# Patient Record
Sex: FEMALE | Race: WHITE | NOT HISPANIC OR LATINO | Employment: OTHER | URBAN - METROPOLITAN AREA
[De-identification: names, ages, dates, MRNs, and addresses within clinical notes are randomized per-mention and may not be internally consistent; named-entity substitution may affect disease eponyms.]

---

## 2017-01-23 RX ORDER — LEVOTHYROXINE SODIUM 88 UG/1
TABLET ORAL
Qty: 45 TABLET | Refills: 2 | Status: SHIPPED | OUTPATIENT
Start: 2017-01-23 | End: 2017-06-09 | Stop reason: SDUPTHER

## 2017-03-30 ENCOUNTER — RESULTS ENCOUNTER (OUTPATIENT)
Dept: FAMILY MEDICINE CLINIC | Facility: CLINIC | Age: 82
End: 2017-03-30

## 2017-03-30 DIAGNOSIS — E03.9 ACQUIRED HYPOTHYROIDISM: ICD-10-CM

## 2017-03-30 DIAGNOSIS — D75.89 MACROCYTOSIS WITHOUT ANEMIA: ICD-10-CM

## 2017-04-03 LAB
BASOPHILS # BLD AUTO: 0.05 10*3/MM3 (ref 0–0.2)
BASOPHILS NFR BLD AUTO: 0.8 % (ref 0–1.5)
EOSINOPHIL # BLD AUTO: 0.16 10*3/MM3 (ref 0–0.7)
EOSINOPHIL NFR BLD AUTO: 2.6 % (ref 0.3–6.2)
ERYTHROCYTE [DISTWIDTH] IN BLOOD BY AUTOMATED COUNT: 14.6 % (ref 11.7–13)
FOLATE SERPL-MCNC: >20 NG/ML (ref 4.78–24.2)
HCT VFR BLD AUTO: 39.1 % (ref 35.6–45.5)
HGB BLD-MCNC: 12.2 G/DL (ref 11.9–15.5)
IMM GRANULOCYTES # BLD: 0 10*3/MM3 (ref 0–0.03)
IMM GRANULOCYTES NFR BLD: 0 % (ref 0–0.5)
LYMPHOCYTES # BLD AUTO: 1.99 10*3/MM3 (ref 0.9–4.8)
LYMPHOCYTES NFR BLD AUTO: 32.4 % (ref 19.6–45.3)
MCH RBC QN AUTO: 32.1 PG (ref 26.9–32)
MCHC RBC AUTO-ENTMCNC: 31.2 G/DL (ref 32.4–36.3)
MCV RBC AUTO: 102.9 FL (ref 80.5–98.2)
METHYLMALONATE SERPL-SCNC: 161 NMOL/L (ref 0–378)
MONOCYTES # BLD AUTO: 0.54 10*3/MM3 (ref 0.2–1.2)
MONOCYTES NFR BLD AUTO: 8.8 % (ref 5–12)
NEUTROPHILS # BLD AUTO: 3.4 10*3/MM3 (ref 1.9–8.1)
NEUTROPHILS NFR BLD AUTO: 55.4 % (ref 42.7–76)
PLATELET # BLD AUTO: 309 10*3/MM3 (ref 140–500)
RBC # BLD AUTO: 3.8 10*6/MM3 (ref 3.9–5.2)
T4 FREE SERPL-MCNC: 1.57 NG/DL (ref 0.93–1.7)
TSH SERPL DL<=0.005 MIU/L-ACNC: 0.82 MIU/ML (ref 0.27–4.2)
VIT B12 SERPL-MCNC: 1582 PG/ML (ref 211–946)
WBC # BLD AUTO: 6.14 10*3/MM3 (ref 4.5–10.7)

## 2017-04-12 ENCOUNTER — TELEPHONE (OUTPATIENT)
Dept: FAMILY MEDICINE CLINIC | Facility: CLINIC | Age: 82
End: 2017-04-12

## 2017-04-12 RX ORDER — MECLIZINE HYDROCHLORIDE 25 MG/1
25 TABLET ORAL EVERY 6 HOURS PRN
Qty: 30 TABLET | Refills: 1 | Status: SHIPPED | OUTPATIENT
Start: 2017-04-12 | End: 2017-04-18 | Stop reason: SDUPTHER

## 2017-04-12 RX ORDER — METHYLPREDNISOLONE 8 MG/1
TABLET ORAL
Qty: 2 TABLET | Refills: 0 | Status: SHIPPED | OUTPATIENT
Start: 2017-04-12 | End: 2017-04-14

## 2017-04-12 NOTE — TELEPHONE ENCOUNTER
----- Message from Emerita Potter sent at 4/12/2017 10:21 AM EDT -----  Contact: PT  DIZZY SPELLS GETTING WORSE , PT IS VERY CONCERNED , SHE HAS AN APPT ON Friday, .  PLEASE ADVISE    CELL [phone]  She had a dizzy spell approximately one week ago.  Then she seemed to do okay and was able to go about her business if she were careful.  She had another dizzy spell last night and again today.  This is described more as vertiginous.  It occurred when she was getting up to go to the bathroom.  She had a recent CBC that was normal.  I suspect this is labyrinthine.  I am going to prescribe some methylprednisolone and meclizine.  Is going to the emergency room for symptoms worsen.

## 2017-04-14 ENCOUNTER — OFFICE VISIT (OUTPATIENT)
Dept: FAMILY MEDICINE CLINIC | Facility: CLINIC | Age: 82
End: 2017-04-14

## 2017-04-14 VITALS
HEART RATE: 71 BPM | RESPIRATION RATE: 15 BRPM | SYSTOLIC BLOOD PRESSURE: 122 MMHG | DIASTOLIC BLOOD PRESSURE: 60 MMHG | TEMPERATURE: 98.3 F

## 2017-04-14 DIAGNOSIS — R82.998 DARK URINE: ICD-10-CM

## 2017-04-14 DIAGNOSIS — R42 DIZZINESS: Primary | ICD-10-CM

## 2017-04-14 DIAGNOSIS — H83.2X9 VESTIBULAR DYSFUNCTION, UNSPECIFIED LATERALITY: ICD-10-CM

## 2017-04-14 LAB
BILIRUB BLD-MCNC: NEGATIVE MG/DL
CLARITY, POC: CLEAR
COLOR UR: YELLOW
GLUCOSE UR STRIP-MCNC: NEGATIVE MG/DL
KETONES UR QL: NEGATIVE
LEUKOCYTE EST, POC: ABNORMAL
NITRITE UR-MCNC: NEGATIVE MG/ML
PH UR: 7 [PH] (ref 5–8)
PROT UR STRIP-MCNC: NEGATIVE MG/DL
RBC # UR STRIP: NEGATIVE /UL
SP GR UR: 1.02 (ref 1–1.03)
UROBILINOGEN UR QL: NORMAL

## 2017-04-14 PROCEDURE — 99214 OFFICE O/P EST MOD 30 MIN: CPT | Performed by: INTERNAL MEDICINE

## 2017-04-14 PROCEDURE — 81003 URINALYSIS AUTO W/O SCOPE: CPT | Performed by: INTERNAL MEDICINE

## 2017-04-14 NOTE — PROGRESS NOTES
Subjective  chief complaint is dizziness  Emerita Bazan is a 81 y.o. female.     History of Present Illness   Emerita is here today for complaints of dizziness.  The first episode occurred approximately 2 weeks ago.  It was very sudden in onset.  She had awakened to go to the bathroom.  She fell into a wall right next to the bed.  She did hit her head but did not feel like she did any serious damage.  This seemed to resolve or at least resolve enough for her to manage the dizziness.  A week later she has begun having more episodes of dizziness.  There is some associated occipital discomfort.  She feels as if something is moving in her head when she turns her head.  Additionally she is complaining of some dark-colored urine.  She reports that she drinks plenty of water each day.  There is no dysuria or hematuria associated with this.  The following portions of the patient's history were reviewed and updated as appropriate: allergies, current medications, past medical history and problem list.    Review of Systems   Constitutional: Negative for chills and fever.   HENT: Positive for congestion.    Genitourinary: Negative for dysuria and hematuria.   Neurological: Positive for dizziness. Negative for speech difficulty, weakness and numbness.       Objective   Physical Exam   Constitutional: She is oriented to person, place, and time. She appears well-developed and well-nourished.   HENT:   External auditory canals are narrow.  There is some guaiacs in the right external auditory canal but the right eardrum looks okay.  The left eardrum looks okay.  There is some bilateral nasal congestion and mild excoriation.  Oral pharynx is clear and palate elevates symmetrically.   Neck: Carotid bruit is not present.   Cardiovascular: Normal rate, regular rhythm and normal heart sounds.    Pressure to my exam is 108/60   Neurological: She is alert and oriented to person, place, and time. She has normal reflexes. She exhibits normal  muscle tone. Coordination normal.   Nursing note and vitals reviewed.      Assessment/Plan   Emerita was seen today for follow-up.    Diagnoses and all orders for this visit:    Dizziness  -     MRI Brain With & Without Contrast; Future  -     Ambulatory Referral to ENT (Otolaryngology)  -     Ambulatory Referral to Physical Therapy Evaluate and treat, Vestibular  -     Comprehensive Metabolic Panel    Vestibular dysfunction, unspecified laterality  -     MRI Brain With & Without Contrast; Future  -     Ambulatory Referral to ENT (Otolaryngology)  -     Ambulatory Referral to Physical Therapy Evaluate and treat, Vestibular  -     Comprehensive Metabolic Panel    Dark urine  -     Comprehensive Metabolic Panel  -     POC Urinalysis Dipstick, Automated  -     Urine Culture      Emerita is here today for evaluation of dizziness.  Her neurologic exam seems to be okay.  I am going to get an MRI scan of the brain because of the discomfort that she is having in the back of her head associated with this.  We are going to have her see an ear nose and throat doctor for possible Bulan Hallpike or Epley maneuvers.  I am also going to set her up for some vestibular retraining.  Hopefully she will get improvement enough to travel to [place] for [place] day.

## 2017-04-16 LAB
ALBUMIN SERPL-MCNC: 4.2 G/DL (ref 3.5–5.2)
ALBUMIN/GLOB SERPL: 1.7 G/DL
ALP SERPL-CCNC: 49 U/L (ref 39–117)
ALT SERPL-CCNC: 12 U/L (ref 1–33)
AST SERPL-CCNC: 15 U/L (ref 1–32)
BACTERIA UR CULT: NORMAL
BACTERIA UR CULT: NORMAL
BILIRUB SERPL-MCNC: 0.4 MG/DL (ref 0.1–1.2)
BUN SERPL-MCNC: 23 MG/DL (ref 8–23)
BUN/CREAT SERPL: 23.7 (ref 7–25)
CALCIUM SERPL-MCNC: 9.4 MG/DL (ref 8.6–10.5)
CHLORIDE SERPL-SCNC: 100 MMOL/L (ref 98–107)
CO2 SERPL-SCNC: 27.8 MMOL/L (ref 22–29)
CREAT SERPL-MCNC: 0.97 MG/DL (ref 0.57–1)
GLOBULIN SER CALC-MCNC: 2.5 GM/DL
GLUCOSE SERPL-MCNC: 85 MG/DL (ref 65–99)
POTASSIUM SERPL-SCNC: 3.7 MMOL/L (ref 3.5–5.2)
PROT SERPL-MCNC: 6.7 G/DL (ref 6–8.5)
SODIUM SERPL-SCNC: 141 MMOL/L (ref 136–145)

## 2017-04-18 RX ORDER — METHYLPREDNISOLONE 4 MG/1
TABLET ORAL
Qty: 21 TABLET | Refills: 0 | Status: SHIPPED | OUTPATIENT
Start: 2017-04-18 | End: 2017-06-09

## 2017-04-18 RX ORDER — MECLIZINE HYDROCHLORIDE 25 MG/1
25 TABLET ORAL EVERY 6 HOURS PRN
Qty: 30 TABLET | Refills: 1 | Status: SHIPPED | OUTPATIENT
Start: 2017-04-18 | End: 2017-06-09

## 2017-04-19 ENCOUNTER — HOSPITAL ENCOUNTER (OUTPATIENT)
Dept: MRI IMAGING | Facility: HOSPITAL | Age: 82
Discharge: HOME | End: 2017-04-19
Attending: INTERNAL MEDICINE | Admitting: INTERNAL MEDICINE

## 2017-04-19 ENCOUNTER — TELEPHONE (OUTPATIENT)
Dept: FAMILY MEDICINE CLINIC | Facility: CLINIC | Age: 82
End: 2017-04-19

## 2017-04-19 DIAGNOSIS — R42 DIZZINESS: ICD-10-CM

## 2017-04-19 DIAGNOSIS — H83.2X9 VESTIBULAR DYSFUNCTION, UNSPECIFIED LATERALITY: ICD-10-CM

## 2017-04-19 LAB — CREAT BLDA-MCNC: 1 MG/DL (ref 0.6–1.3)

## 2017-04-19 PROCEDURE — 0 GADOBENATE DIMEGLUMINE 529 MG/ML SOLUTION: Performed by: INTERNAL MEDICINE

## 2017-04-19 PROCEDURE — 70553 MRI BRAIN STEM W/O & W/DYE: CPT

## 2017-04-19 PROCEDURE — 82565 ASSAY OF CREATININE: CPT

## 2017-04-19 PROCEDURE — A9577 INJ MULTIHANCE: HCPCS | Performed by: INTERNAL MEDICINE

## 2017-04-19 RX ORDER — CEPHALEXIN 250 MG/1
500 CAPSULE ORAL 2 TIMES DAILY
Qty: 14 CAPSULE | Refills: 0 | Status: SHIPPED | OUTPATIENT
Start: 2017-04-19 | End: 2017-04-25

## 2017-04-19 RX ADMIN — GADOBENATE DIMEGLUMINE 11 ML: 529 INJECTION, SOLUTION INTRAVENOUS at 19:15

## 2017-04-19 NOTE — TELEPHONE ENCOUNTER
Pt says an antibiotic for her UTI was to be sent to her pharmacy. I do not see anything in chart regarding so. If so, can you please let me know what to call in for her?    Not sure what happened but script now sent

## 2017-04-24 ENCOUNTER — TELEPHONE (OUTPATIENT)
Dept: FAMILY MEDICINE CLINIC | Facility: CLINIC | Age: 82
End: 2017-04-24

## 2017-04-24 ENCOUNTER — HOSPITAL ENCOUNTER (OUTPATIENT)
Dept: PHYSICAL THERAPY | Facility: HOSPITAL | Age: 82
Setting detail: THERAPIES SERIES
Discharge: HOME | End: 2017-04-24
Attending: INTERNAL MEDICINE

## 2017-04-24 DIAGNOSIS — R42 DIZZINESS: Primary | ICD-10-CM

## 2017-04-24 DIAGNOSIS — H81.11 BENIGN PAROXYSMAL POSITIONAL VERTIGO, RIGHT: ICD-10-CM

## 2017-04-24 PROCEDURE — G8979 MOBILITY GOAL STATUS: HCPCS

## 2017-04-24 PROCEDURE — 97161 PT EVAL LOW COMPLEX 20 MIN: CPT

## 2017-04-24 PROCEDURE — 95992 CANALITH REPOSITIONING PROC: CPT

## 2017-04-24 PROCEDURE — G8978 MOBILITY CURRENT STATUS: HCPCS

## 2017-04-24 NOTE — PROGRESS NOTES
"    Outpatient Physical Therapy Vestibular Initial Evaluation  [facility]     Patient Name: Emerita Bazan  : 1935  MRN: [id number]  Today's Date: 2017      Visit Date: 2017    Patient Active Problem List   Diagnosis   • Atopic rhinitis   • Bunion   • Gastroesophageal reflux disease   • Hammer toe   • Hyperlipidemia   • Hypothyroidism   • Neuralgia   • Melanocytic nevus   • Seborrheic keratosis        Past Medical History:   Diagnosis Date   • Arthritis    • Hypothyroid    • Osteoporosis         Past Surgical History:   Procedure Laterality Date   • BUNIONECTOMY     • EYE SURGERY      cataracts         Visit Dx:     ICD-10-CM ICD-9-CM   1. Dizziness R42 780.4   2. Benign paroxysmal positional vertigo, right H81.11 386.11             Patient History       17 1302          History    Chief Complaint Dizziness;Balance Problems  -SETH      Date Current Problem(s) Began 17  -SETH      Brief Description of Current Complaint Pt reports sudden onset of spinning sensation getting up out of bed ~ 3 weeks ago. Pt said she lost her balance to the R and hit her head after she stood up that day. She went to see her PCP ~ 1 week later and he ordered \"anti-dizzy\" medicine and steroids. She reports symptoms are a little better but still present when she lies down in bed, went to the dentist and hairdresser last week too.   -SETH      Previous treatment for THIS PROBLEM Medication  -SETH      Onset Date- PT 2017  -SETH      Patient/Caregiver Goals Relief from dizziness  -SETH      Patient seeing anyone else for problem(s)? Yes   pt scheduled to see ENT next week.   -SETH      Pain     Pain at Present 3   in back of head  -SETH      Fall Risk Assessment    Any falls in the past year: Yes  -SETH      Number of falls reported in the last 12 months 2   in past year  -SETH      Factors that contributed to the fall: Lost balance;Tripped  -SETH      Daily Activities    Primary Language English  -SETH      Are you able to read " "Yes  -SETH      Are you able to write Yes  -SETH      How does patient learn best? Listening;Reading  -SETH      Teaching needs identified Home Exercise Program;Management of Condition;Falls Prevention  -SETH      Patient is concerned about/has problems with Walking;Performing home management (household chores, shopping, care of dependents)  -SETH      Barriers to learning None  -SETH      Pt Participated in POC and Goals Yes  -SETH      Safety    Are you being hurt, hit, or frightened by anyone at home or in your life? No  -SETH      Are you being neglected by a caregiver No  -SETH        User Key  (r) = Recorded By, (t) = Taken By, (c) = Cosigned By    Initials Name Provider Type    SETH Mcknight, PT Physical Therapist                Vestibular Marcella       04/24/17 1302          Subjective Comments    Subjective Comments Pt said she is currently not taking \"anti dizzy\" medicine and feeling OK except when she changes positions.   -SETH      Pain Assessment    Pain Assessment 0-10  -SETH      Pain Score 3  -SETH      Post Pain Score 3  -SETH      Pain Location Head  -SETH      Pain Orientation Posterior  -SETH      Vestibular Objective    General Observation Pt is well nourished female who arrived ambulating without assistive device.   -SETH      Cognition    Orientation Level Oriented to place;Oriented to time;Oriented to situation;Oriented to person  -SETH      Symptom Behavior    Type of Dizziness Spinning;Funny feeling in head;Lightheadedness;Imbalance  -SETH      Frequency of Dizziness Several Times a Day   mostly when in aggravating positions  -SETH      Duration of Dizziness Seconds  -SETH      Aggravating Factors Looking up to the ceiling;Supine to sit;Lying supine;Rolling to right;Rolling to left;Forward bending  -SETH      Relieving Factors Slow movements;Rest  -SETH      Occulomotor Exam Fixation Present    Smooth Pursuit Normal   no fixation present  -SETH      Saccades Intact   no fixation present   -SETH      VOR with Occulomotor Exam Fixation " "Absent     Spontaneous Nystagmus Absent  -SETH      Gaze-Induced Nystagmus Absent  -SETH      Head-Shaking Nystagmus Horizontal Absent  -SETH      Head-Shaking Nystagmus Vertical Absent   c/o feeling woozy once stopped moving head  -SETH      Positional Testing    Positional Testing Without infrared goggles  -SETH      Vertebrobasilar Artery Screen - Right Negative  -SETH      Vertebrobasilar Artery Screen - Left Negative  -SETH      Lakhwinder-Hallpike Right Upbeat Nystagmus  -SETH      Lakhwinder-Hallpike Right Onset Time  Head off mat -- immediate onset with c/o spinning sensation lasting ~ 10 seconds. To sit, c/o spinning sensation ~ 15 seconds.   -SETH      Gilliam-Hallpike Left No nystagmus   no symptoms  -SETH      ROM (Range of Motion)    General ROM no range of motion deficits identified  -SETH      Sensation    Sensation --   Light touch intact all extremities  -SETH      Strength    Lumbar/Hip --   Strength LE's 4+/5 except 4/5 hip flexors.   -SETH      Static    Static --   modified CTSIB independent except foam EC CG/min 10 sec.   -SETH      High-level Balance    High-level Balance Other Transfers: independent  -SETH        User Key  (r) = Recorded By, (t) = Taken By, (c) = Cosigned By    Initials Name Provider Type    SETH Mcknight PT Physical Therapist                            Therapy Education       04/24/17 1618          Therapy Education    Given HEP;Symptoms/condition management   Ling/David to start on Wednesday. Post CRT instructions.   -SETH      Program New  -SETH      How Provided Verbal;Demonstration;Written  -SETH      Provided to Patient  -SETH      Level of Understanding Teach back education performed;Verbalized;Demonstrated  -SETH        User Key  (r) = Recorded By, (t) = Taken By, (c) = Cosigned By    Initials Name Provider Type    SETH Mcknight PT Physical Therapist                  Exercises       04/24/17 1302          Subjective Comments    Subjective Comments Pt said she is currently not taking \"anti dizzy\" medicine and " feeling OK except when she changes positions.   -SETH        User Key  (r) = Recorded By, (t) = Taken By, (c) = Cosigned By    Initials Name Provider Type    SETH Mcknight PT Physical Therapist                     Balance Exercises (last 24 hours)      Balance Exercises       04/24/17 1302          Canalith Repositioning    Activity Epley;Right  -SETH      Reps 2  -SETH      Intensity Mild;Moderate   pt sat ~ 10 minutes after each Epley  -SETH        User Key  (r) = Recorded By, (t) = Taken By, (c) = Cosigned By    Initials Name Provider Type    SETH Mcknight, PT Physical Therapist                PT OP Goals       04/24/17 1600       Long Term Goals    LTG Date to Achieve 05/22/17  -SETH     LTG 1 Pt will be independent with vestibular HEP.   -SETH     LTG 2 Pt will have reduced score on the Dizziness Handicap Inventory of < 10.   -SETH     LTG 3 Pt will have a score of 22/24 on the Dynamic Gait Index for improved balance with gait related tasks and decreased fall risk.   -SETH     LTG 4 Pt will ambulate 30' x 2 with horizontal head turns and then vertical head turns independently without symptoms of imbalance or dizziness.   -SETH       User Key  (r) = Recorded By, (t) = Taken By, (c) = Cosigned By    Initials Name Provider Type    SETH Mcknight, PT Physical Therapist                PT Assessment/Plan       04/24/17 1630       PT Assessment    Functional Limitations Decreased safety during functional activities;Limitation in home management;Performance in self-care ADL;Performance in leisure activities  -SETH     Impairments Balance  -     Assessment Comments Pt presents with symptoms consistent with positional vertigo with higher score on the Dizziness Handicap Inventory with some symptoms interfering with her score on the Dynamic Gait Index. She had positive R [place]-Hallpike. Therefore R CRT completed. Pt will benefit from outpt PT for vestibular rehab.   -SETH     Please refer to paper survey for additional  self-reported information Yes  -SETH     Rehab Potential Good  -SETH     Patient/caregiver participated in establishment of treatment plan and goals Yes  -SETH     Patient would benefit from skilled therapy intervention Yes  -SETH     PT Plan    PT Frequency 1x/week  -SETH     Predicted Duration of Therapy Intervention (days/wks) 4 weeks  -SETH     Planned CPT's? PT EVAL LOW COMPLEXITY: 49845   and Canalith Re-positioning   -SETH     Physical Therapy Interventions (Optional Details) balance training;vestibular training  -SETH       User Key  (r) = Recorded By, (t) = Taken By, (c) = Cosigned By    Initials Name Provider Type    SETH Susy Mcknight, PT Physical Therapist                 Outcome Measures       04/24/17 1302          Dynamic Gait Index (DGI)    Gait Level Surface 3  -SETH      Change in Gait Speed 3  -SETH      Gait with Horizontal Head Turns 2   lightheaded   -SETH      Gait with Vertical Head Turns 1   woozy, off balance  -SETH      Gait and Pivot Turn 3  -SETH      Step Over Obstacle 3  -SETH      Step Around Obstacles 3  -SETH      Steps 2  -SETH      Dynamic Gait Index Score 20  -SETH      Functional Assessment    Outcome Measure Options Dizziness Handicap Inventory;Dynamic Gait Index   score of  32  -SETH        User Key  (r) = Recorded By, (t) = Taken By, (c) = Cosigned By    Initials Name Provider Type    SETH Susy Mcknight, PT Physical Therapist            Time Calculation:   Start Time: 1302  Stop Time: 1402  Time Calculation (min): 60 min     Therapy Charges for Today     Code Description Service Date Service Provider Modifiers Qty    39388285574 HC PT MOBILITY CURRENT 4/24/2017 Susy Mcknight, PT GP, CI 1    57979549756 HC PT MOBILITY PROJECTED 4/24/2017 Susy Mcknight, PT GP, CI 1    12415164863 HC PT EVAL LOW COMPLEXITY 3 4/24/2017 Susy Mcknight, PT GP 1    75687629625 HC PT CANALITH REPOSITIONING PER DAY 4/24/2017 Susy Mcknight, PT GP 1          PT G-Codes  PT Professional Judgement Used?: Yes  Outcome Measure  Options: Dizziness Handicap Inventory, Dynamic Gait Index  Functional Limitation: Mobility: Walking and moving around  Mobility: Walking and Moving Around Current Status (): At least 1 percent but less than 20 percent impaired, limited or restricted  Mobility: Walking and Moving Around Goal Status (): At least 1 percent but less than 20 percent impaired, limited or restricted         Susy Mcknight, PT  4/24/2017

## 2017-04-25 RX ORDER — AMOXICILLIN 875 MG/1
875 TABLET, COATED ORAL 2 TIMES DAILY
Qty: 20 TABLET | Refills: 0 | Status: SHIPPED | OUTPATIENT
Start: 2017-04-25 | End: 2017-06-09

## 2017-04-27 ENCOUNTER — APPOINTMENT (OUTPATIENT)
Dept: PHYSICAL THERAPY | Facility: HOSPITAL | Age: 82
End: 2017-04-27

## 2017-05-04 ENCOUNTER — APPOINTMENT (OUTPATIENT)
Dept: PHYSICAL THERAPY | Facility: HOSPITAL | Age: 82
End: 2017-05-04

## 2017-05-04 ENCOUNTER — TELEPHONE (OUTPATIENT)
Dept: PHYSICAL THERAPY | Facility: HOSPITAL | Age: 82
End: 2017-05-04

## 2017-05-10 RX ORDER — LEVOTHYROXINE SODIUM 0.1 MG/1
100 TABLET ORAL EVERY OTHER DAY
Qty: 45 TABLET | Refills: 1 | Status: SHIPPED | OUTPATIENT
Start: 2017-05-10 | End: 2017-06-09 | Stop reason: SDUPTHER

## 2017-05-11 ENCOUNTER — APPOINTMENT (OUTPATIENT)
Dept: PHYSICAL THERAPY | Facility: HOSPITAL | Age: 82
End: 2017-05-11

## 2017-05-18 ENCOUNTER — APPOINTMENT (OUTPATIENT)
Dept: PHYSICAL THERAPY | Facility: HOSPITAL | Age: 82
End: 2017-05-18

## 2017-05-25 ENCOUNTER — APPOINTMENT (OUTPATIENT)
Dept: PHYSICAL THERAPY | Facility: HOSPITAL | Age: 82
End: 2017-05-25

## 2017-05-30 ENCOUNTER — RESULTS ENCOUNTER (OUTPATIENT)
Dept: FAMILY MEDICINE CLINIC | Facility: CLINIC | Age: 82
End: 2017-05-30

## 2017-05-30 DIAGNOSIS — D75.89 MACROCYTOSIS WITHOUT ANEMIA: ICD-10-CM

## 2017-05-30 DIAGNOSIS — E78.5 HYPERLIPIDEMIA, UNSPECIFIED HYPERLIPIDEMIA TYPE: Primary | ICD-10-CM

## 2017-05-30 DIAGNOSIS — N30.90 CYSTITIS: ICD-10-CM

## 2017-05-31 ENCOUNTER — RESULTS ENCOUNTER (OUTPATIENT)
Dept: FAMILY MEDICINE CLINIC | Facility: CLINIC | Age: 82
End: 2017-05-31

## 2017-05-31 DIAGNOSIS — D75.89 MACROCYTOSIS WITHOUT ANEMIA: ICD-10-CM

## 2017-05-31 DIAGNOSIS — E78.5 HYPERLIPIDEMIA, UNSPECIFIED HYPERLIPIDEMIA TYPE: ICD-10-CM

## 2017-06-01 ENCOUNTER — APPOINTMENT (OUTPATIENT)
Dept: PHYSICAL THERAPY | Facility: HOSPITAL | Age: 82
End: 2017-06-01

## 2017-06-02 LAB
ALBUMIN SERPL-MCNC: 3.9 G/DL (ref 3.5–5.2)
ALBUMIN/GLOB SERPL: 1.4 G/DL
ALP SERPL-CCNC: 50 U/L (ref 39–117)
ALT SERPL-CCNC: 12 U/L (ref 1–33)
AST SERPL-CCNC: 16 U/L (ref 1–32)
BASOPHILS # BLD AUTO: 0.04 10*3/MM3 (ref 0–0.2)
BASOPHILS NFR BLD AUTO: 0.5 % (ref 0–1.5)
BILIRUB SERPL-MCNC: 0.4 MG/DL (ref 0.1–1.2)
BUN SERPL-MCNC: 20 MG/DL (ref 8–23)
BUN/CREAT SERPL: 22 (ref 7–25)
CALCIUM SERPL-MCNC: 9.5 MG/DL (ref 8.6–10.5)
CHLORIDE SERPL-SCNC: 102 MMOL/L (ref 98–107)
CHOLEST SERPL-MCNC: 221 MG/DL (ref 0–200)
CO2 SERPL-SCNC: 24.8 MMOL/L (ref 22–29)
CREAT SERPL-MCNC: 0.91 MG/DL (ref 0.57–1)
EOSINOPHIL # BLD AUTO: 0.24 10*3/MM3 (ref 0–0.7)
EOSINOPHIL NFR BLD AUTO: 3.2 % (ref 0.3–6.2)
ERYTHROCYTE [DISTWIDTH] IN BLOOD BY AUTOMATED COUNT: 13.4 % (ref 11.7–13)
GLOBULIN SER CALC-MCNC: 2.8 GM/DL
GLUCOSE SERPL-MCNC: 87 MG/DL (ref 65–99)
HCT VFR BLD AUTO: 39.1 % (ref 35.6–45.5)
HDLC SERPL-MCNC: 56 MG/DL (ref 40–60)
HGB BLD-MCNC: 12.7 G/DL (ref 11.9–15.5)
IMM GRANULOCYTES # BLD: 0 10*3/MM3 (ref 0–0.03)
IMM GRANULOCYTES NFR BLD: 0 % (ref 0–0.5)
INTERPRETATION: NORMAL
LDLC SERPL CALC-MCNC: 136 MG/DL (ref 0–100)
LYMPHOCYTES # BLD AUTO: 2.09 10*3/MM3 (ref 0.9–4.8)
LYMPHOCYTES NFR BLD AUTO: 28 % (ref 19.6–45.3)
MCH RBC QN AUTO: 33.3 PG (ref 26.9–32)
MCHC RBC AUTO-ENTMCNC: 32.5 G/DL (ref 32.4–36.3)
MCV RBC AUTO: 102.6 FL (ref 80.5–98.2)
MONOCYTES # BLD AUTO: 0.72 10*3/MM3 (ref 0.2–1.2)
MONOCYTES NFR BLD AUTO: 9.7 % (ref 5–12)
NEUTROPHILS # BLD AUTO: 4.37 10*3/MM3 (ref 1.9–8.1)
NEUTROPHILS NFR BLD AUTO: 58.6 % (ref 42.7–76)
PLATELET # BLD AUTO: 295 10*3/MM3 (ref 140–500)
POTASSIUM SERPL-SCNC: 4.4 MMOL/L (ref 3.5–5.2)
PROT SERPL-MCNC: 6.7 G/DL (ref 6–8.5)
RBC # BLD AUTO: 3.81 10*6/MM3 (ref 3.9–5.2)
SODIUM SERPL-SCNC: 140 MMOL/L (ref 136–145)
TRIGL SERPL-MCNC: 145 MG/DL (ref 0–150)
VLDLC SERPL CALC-MCNC: 29 MG/DL (ref 5–40)
WBC # BLD AUTO: 7.46 10*3/MM3 (ref 4.5–10.7)

## 2017-06-04 LAB
BACTERIA UR CULT: NORMAL
BACTERIA UR CULT: NORMAL

## 2017-06-05 ENCOUNTER — TELEPHONE (OUTPATIENT)
Dept: PHYSICAL THERAPY | Facility: HOSPITAL | Age: 82
End: 2017-06-05

## 2017-06-05 ENCOUNTER — DOCUMENTATION (OUTPATIENT)
Dept: PHYSICAL THERAPY | Facility: HOSPITAL | Age: 82
End: 2017-06-05

## 2017-06-05 NOTE — THERAPY DISCHARGE NOTE
Outpatient Physical Therapy Discharge Summary         Patient Name: Emerita Bazan  : 1935  MRN: [id number]    Today's Date: 2017    Visit Dx:  No diagnosis found.          PT OP Goals       17 1700       Long Term Goals    LTG Date to Achieve 17  -SETH     LTG 1 Pt will be independent with vestibular HEP.   -SETH     LTG 2 Pt will have reduced score on the Dizziness Handicap Inventory of < 10.   -SETH     LTG 2 Progress Not Met  -SETH     LTG 2 Progress Comments Spoke to pt on phone today and she said all vestibular symptoms are resolved but pt did not return for formal retest of Dizziness Handicap Inventory.   -SETH     LTG 3 Pt will have a score of 22/24 on the Dynamic Gait Index for improved balance with gait related tasks and decreased fall risk.   -SETH     LTG 3 Progress Not Met  -SETH     LTG 3 Progress Comments Pt did not return for retest.   -SETH     LTG 4 Pt will ambulate 30' x 2 with horizontal head turns and then vertical head turns independently without symptoms of imbalance or dizziness.   -SETH     LTG 4 Progress Not Met  -SETH     LTG 4 Progress Comments Pt did not return to be retested.   -SETH       User Key  (r) = Recorded By, (t) = Taken By, (c) = Cosigned By    Initials Name Provider Type    SETH Mcknight, PT Physical Therapist          OP PT Discharge Summary  Date of Discharge: 17  Reason for Discharge: Independent  Outcomes Achieved: Patient able to partially acheive established goals (Pt told PT on phone today that all her vestibular symptoms have been resolved. )  Discharge Destination: Home without follow-up      Time Calculation:                    Susy Mcknight, PT  2017

## 2017-06-08 ENCOUNTER — APPOINTMENT (OUTPATIENT)
Dept: PHYSICAL THERAPY | Facility: HOSPITAL | Age: 82
End: 2017-06-08

## 2017-06-09 ENCOUNTER — OFFICE VISIT (OUTPATIENT)
Dept: FAMILY MEDICINE CLINIC | Facility: CLINIC | Age: 82
End: 2017-06-09

## 2017-06-09 VITALS
SYSTOLIC BLOOD PRESSURE: 102 MMHG | RESPIRATION RATE: 15 BRPM | DIASTOLIC BLOOD PRESSURE: 60 MMHG | BODY MASS INDEX: 25.4 KG/M2 | HEIGHT: 59 IN | WEIGHT: 126 LBS | HEART RATE: 87 BPM | TEMPERATURE: 98.1 F

## 2017-06-09 DIAGNOSIS — E03.9 ACQUIRED HYPOTHYROIDISM: ICD-10-CM

## 2017-06-09 DIAGNOSIS — D75.89 MACROCYTOSIS WITHOUT ANEMIA: Primary | ICD-10-CM

## 2017-06-09 DIAGNOSIS — E78.5 HYPERLIPIDEMIA, UNSPECIFIED HYPERLIPIDEMIA TYPE: ICD-10-CM

## 2017-06-09 PROCEDURE — 99214 OFFICE O/P EST MOD 30 MIN: CPT | Performed by: INTERNAL MEDICINE

## 2017-06-09 RX ORDER — LEVOTHYROXINE SODIUM 88 UG/1
88 TABLET ORAL EVERY OTHER DAY
Qty: 50 TABLET | Refills: 1 | Status: SHIPPED | OUTPATIENT
Start: 2017-06-09 | End: 2017-06-09 | Stop reason: SDUPTHER

## 2017-06-09 RX ORDER — ACETAMINOPHEN 500 MG
500 TABLET ORAL AS NEEDED
COMMUNITY

## 2017-06-09 RX ORDER — LEVOTHYROXINE SODIUM 88 UG/1
88 TABLET ORAL EVERY OTHER DAY
Qty: 50 TABLET | Refills: 1 | Status: SHIPPED | OUTPATIENT
Start: 2017-06-09 | End: 2017-07-31 | Stop reason: SDUPTHER

## 2017-06-09 RX ORDER — LEVOTHYROXINE SODIUM 0.1 MG/1
100 TABLET ORAL EVERY OTHER DAY
Qty: 50 TABLET | Refills: 1 | Status: SHIPPED | OUTPATIENT
Start: 2017-06-09 | End: 2017-07-31 | Stop reason: SDUPTHER

## 2017-06-09 NOTE — PROGRESS NOTES
Subjective chief complaint is follow-up for dizziness and lab results  Emerita Bazan is a 81 y.o. female.     History of Present Illness   Emerita is here today for follow-up.  Her dizziness seems to have improved.  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  It is suspected that these occurred at the time she had her liver abscess.  I do not think they are contributing to her balance problems.  Her balance issues seem to have resolved.  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  She had normal vitamin B12 and folic acid levels.  The other cell lines seem to be okay.  We did discuss this and have decided just to continue to watch this.  We did discuss the possibility of a myelodysplastic process.  She is on some level thyroxine alternating 88 µg 100 µg.  This is controlled her thyroid fairly well.  Apparently there was a mixup at the pharmacy we are going to give her written prescriptions for these today.  Her gynecologist is going to be retiring we did discuss having her see Dr. Bergeron.  She continues to complain of some yellow urine.  Her specific gravity on her urinalysis was 1.020.  We did discuss drinking more water.  Past medical history is remarkable for hyperlipidemia.  This is very mild.  We did discuss this in the face of her small vessel disease.  We are going to have her continue to watch her diet.  The following portions of the patient's history were reviewed and updated as appropriate: allergies, current medications, past medical history and problem list.    Review of Systems   Constitutional: Negative for activity change and appetite change.       Objective   Physical Exam   Constitutional: She appears well-developed and well-nourished.   Cardiovascular: Normal rate, regular rhythm and normal heart sounds.    Pulmonary/Chest: Effort normal and breath sounds normal.   Abdominal:   There is no hepatosplenomegaly   Nursing note and vitals reviewed.      Assessment/Plan   Emerita was seen today  for med management and follow-up.    Diagnoses and all orders for this visit:    Macrocytosis without anemia  -     CBC & Differential; Future    Acquired hypothyroidism    Hyperlipidemia, unspecified hyperlipidemia type    Other orders  -     levothyroxine (SYNTHROID, LEVOTHROID) 100 MCG tablet; Take 1 tablet by mouth Every Other Day.  -     Discontinue: levothyroxine (SYNTHROID, LEVOTHROID) 88 MCG tablet; Take 1 tablet by mouth Every Other Day.  -     levothyroxine (SYNTHROID, LEVOTHROID) 88 MCG tablet; Take 1 tablet by mouth Every Other Day.      Emerita is here today for follow-up.  We did discuss her laboratories in detail.  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  We did advise her to drink more water for the concentrated urine.  We are going to let her continue to watch her diet for her cholesterol.  We are going to do a follow-up blood count in 3 months.  Should she develop problems in her other cell lines we may refer her to a hematologist.

## 2017-06-15 ENCOUNTER — APPOINTMENT (OUTPATIENT)
Dept: PHYSICAL THERAPY | Facility: HOSPITAL | Age: 82
End: 2017-06-15

## 2017-06-22 ENCOUNTER — APPOINTMENT (OUTPATIENT)
Dept: PHYSICAL THERAPY | Facility: HOSPITAL | Age: 82
End: 2017-06-22

## 2017-06-29 ENCOUNTER — APPOINTMENT (OUTPATIENT)
Dept: PHYSICAL THERAPY | Facility: HOSPITAL | Age: 82
End: 2017-06-29

## 2017-07-20 ENCOUNTER — OFFICE VISIT (OUTPATIENT)
Dept: FAMILY MEDICINE CLINIC | Facility: CLINIC | Age: 82
End: 2017-07-20

## 2017-07-20 VITALS
HEART RATE: 71 BPM | HEIGHT: 59 IN | RESPIRATION RATE: 16 BRPM | DIASTOLIC BLOOD PRESSURE: 70 MMHG | SYSTOLIC BLOOD PRESSURE: 118 MMHG | TEMPERATURE: 97.7 F

## 2017-07-20 DIAGNOSIS — F41.9 ANXIETY: ICD-10-CM
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
## 2017-07-20 DIAGNOSIS — R42 VERTIGO: ICD-10-CM

## 2017-07-20 DIAGNOSIS — T07.XXXA MULTIPLE CONTUSIONS: ICD-10-CM

## 2017-07-20 PROCEDURE — 99214 OFFICE O/P EST MOD 30 MIN: CPT | Performed by: INTERNAL MEDICINE

## 2017-07-20 RX ORDER — MECLIZINE HYDROCHLORIDE 25 MG/1
25 TABLET ORAL EVERY 6 HOURS PRN
Qty: 30 TABLET | Refills: 1 | Status: SHIPPED | OUTPATIENT
Start: 2017-07-20 | End: 2017-10-19
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
## 2017-07-31 ENCOUNTER — OFFICE VISIT (OUTPATIENT)
Dept: FAMILY MEDICINE CLINIC | Facility: CLINIC | Age: 82
End: 2017-07-31

## 2017-07-31 VITALS
DIASTOLIC BLOOD PRESSURE: 62 MMHG | SYSTOLIC BLOOD PRESSURE: 100 MMHG | HEIGHT: 59 IN | HEART RATE: 79 BPM | TEMPERATURE: 98.2 F

## 2017-07-31 DIAGNOSIS — R42 VERTIGO: ICD-10-CM
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
## 2017-07-31 DIAGNOSIS — K64.9 HEMORRHOIDS, UNSPECIFIED HEMORRHOID TYPE: ICD-10-CM

## 2017-07-31 DIAGNOSIS — M53.3 COCCYX PAIN: ICD-10-CM

## 2017-07-31 DIAGNOSIS — M54.50 ACUTE MIDLINE LOW BACK PAIN WITHOUT SCIATICA: ICD-10-CM

## 2017-07-31 DIAGNOSIS — M54.6 ACUTE RIGHT-SIDED THORACIC BACK PAIN: ICD-10-CM

## 2017-07-31 DIAGNOSIS — M25.532 LEFT WRIST PAIN: ICD-10-CM

## 2017-07-31 PROCEDURE — 99214 OFFICE O/P EST MOD 30 MIN: CPT | Performed by: INTERNAL MEDICINE

## 2017-07-31 RX ORDER — HYDROCORTISONE ACETATE 25 MG/1
25 SUPPOSITORY RECTAL 2 TIMES DAILY
Qty: 20 SUPPOSITORY | Refills: 0 | Status: SHIPPED | OUTPATIENT
Start: 2017-07-31 | End: 2017-07-31 | Stop reason: SDUPTHER

## 2017-07-31 RX ORDER — HYDROCORTISONE ACETATE 25 MG/1
25 SUPPOSITORY RECTAL 2 TIMES DAILY
Qty: 20 SUPPOSITORY | Refills: 0 | Status: SHIPPED | OUTPATIENT
Start: 2017-07-31 | End: 2017-10-19

## 2017-07-31 RX ORDER — LEVOTHYROXINE SODIUM 88 UG/1
TABLET ORAL
Qty: 65 TABLET | Refills: 1 | Status: SHIPPED | OUTPATIENT
Start: 2017-07-31 | End: 2018-02-02

## 2017-07-31 RX ORDER — LEVOTHYROXINE SODIUM 0.1 MG/1
TABLET ORAL
Qty: 30 TABLET | Refills: 1 | Status: SHIPPED | OUTPATIENT
Start: 2017-07-31 | End: 2017-10-19
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
## 2017-08-02 ENCOUNTER — HOSPITAL ENCOUNTER (OUTPATIENT)
Dept: GENERAL RADIOLOGY | Facility: HOSPITAL | Age: 82
Discharge: HOME | End: 2017-08-02
Attending: INTERNAL MEDICINE | Admitting: INTERNAL MEDICINE

## 2017-08-02 ENCOUNTER — HOSPITAL ENCOUNTER (OUTPATIENT)
Dept: GENERAL RADIOLOGY | Facility: HOSPITAL | Age: 82
Discharge: HOME | End: 2017-08-02
Attending: INTERNAL MEDICINE

## 2017-08-02 DIAGNOSIS — IMO0002 COMPRESSION FRACTURE: Primary | ICD-10-CM

## 2017-08-02 DIAGNOSIS — M54.6 ACUTE RIGHT-SIDED THORACIC BACK PAIN: ICD-10-CM

## 2017-08-02 DIAGNOSIS — M25.532 LEFT WRIST PAIN: ICD-10-CM

## 2017-08-02 PROCEDURE — 73110 X-RAY EXAM OF WRIST: CPT

## 2017-08-02 PROCEDURE — 72072 X-RAY EXAM THORAC SPINE 3VWS: CPT

## 2017-08-07 ENCOUNTER — HOSPITAL ENCOUNTER (OUTPATIENT)
Dept: MRI IMAGING | Facility: HOSPITAL | Age: 82
Discharge: HOME | End: 2017-08-07
Attending: INTERNAL MEDICINE | Admitting: INTERNAL MEDICINE

## 2017-08-07 ENCOUNTER — HOSPITAL ENCOUNTER (OUTPATIENT)
Dept: MRI IMAGING | Facility: HOSPITAL | Age: 82
Discharge: HOME | End: 2017-08-07
Attending: INTERNAL MEDICINE

## 2017-08-07 DIAGNOSIS — IMO0002 COMPRESSION FRACTURE: ICD-10-CM

## 2017-08-07 DIAGNOSIS — M54.50 ACUTE MIDLINE LOW BACK PAIN WITHOUT SCIATICA: ICD-10-CM

## 2017-08-07 DIAGNOSIS — M53.3 COCCYX PAIN: ICD-10-CM

## 2017-08-07 PROCEDURE — 72148 MRI LUMBAR SPINE W/O DYE: CPT

## 2017-08-07 PROCEDURE — 72146 MRI CHEST SPINE W/O DYE: CPT

## 2017-08-07 PROCEDURE — 72195 MRI PELVIS W/O DYE: CPT

## 2017-08-08 ENCOUNTER — APPOINTMENT (OUTPATIENT)
Dept: MRI IMAGING | Facility: HOSPITAL | Age: 82
End: 2017-08-08
Attending: INTERNAL MEDICINE

## 2017-08-08 ENCOUNTER — HOSPITAL ENCOUNTER (OUTPATIENT)
Dept: MRI IMAGING | Facility: HOSPITAL | Age: 82
Discharge: HOME | End: 2017-08-08
Attending: INTERNAL MEDICINE | Admitting: INTERNAL MEDICINE

## 2017-08-08 DIAGNOSIS — R42 VERTIGO: ICD-10-CM

## 2017-08-08 PROCEDURE — 0 GADOBENATE DIMEGLUMINE 529 MG/ML SOLUTION: Performed by: INTERNAL MEDICINE

## 2017-08-08 PROCEDURE — 82565 ASSAY OF CREATININE: CPT

## 2017-08-08 PROCEDURE — A9577 INJ MULTIHANCE: HCPCS | Performed by: INTERNAL MEDICINE

## 2017-08-08 PROCEDURE — 70553 MRI BRAIN STEM W/O & W/DYE: CPT

## 2017-08-08 RX ADMIN — GADOBENATE DIMEGLUMINE 11 ML: 529 INJECTION, SOLUTION INTRAVENOUS at 20:05

## 2017-08-09 DIAGNOSIS — M48.52XS: ICD-10-CM

## 2017-08-09 DIAGNOSIS — S32.121S: Primary | ICD-10-CM

## 2017-08-09 LAB — CREAT BLDA-MCNC: 1.2 MG/DL (ref 0.6–1.3)

## 2017-08-14 ENCOUNTER — HOSPITAL ENCOUNTER (OUTPATIENT)
Dept: PHYSICAL THERAPY | Facility: HOSPITAL | Age: 82
Setting detail: THERAPIES SERIES
Discharge: HOME | End: 2017-08-14
Attending: INTERNAL MEDICINE

## 2017-08-14 DIAGNOSIS — R42 VERTIGO: Primary | ICD-10-CM

## 2017-08-14 DIAGNOSIS — Z78.9 DIFFICULTY WITH ACTIVITIES OF DAILY LIVING: ICD-10-CM

## 2017-08-14 PROCEDURE — G8981 BODY POS CURRENT STATUS: HCPCS | Performed by: PHYSICAL THERAPIST

## 2017-08-14 PROCEDURE — 97162 PT EVAL MOD COMPLEX 30 MIN: CPT | Performed by: PHYSICAL THERAPIST

## 2017-08-14 PROCEDURE — G8982 BODY POS GOAL STATUS: HCPCS | Performed by: PHYSICAL THERAPIST

## 2017-08-14 PROCEDURE — 97112 NEUROMUSCULAR REEDUCATION: CPT | Performed by: PHYSICAL THERAPIST
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
## 2017-08-24 ENCOUNTER — APPOINTMENT (OUTPATIENT)
Dept: PHYSICAL THERAPY | Facility: HOSPITAL | Age: 82
End: 2017-08-24

## 2017-08-24 ENCOUNTER — HOSPITAL ENCOUNTER (OUTPATIENT)
Dept: PHYSICAL THERAPY | Facility: HOSPITAL | Age: 82
Setting detail: THERAPIES SERIES
Discharge: HOME | End: 2017-08-24

## 2017-08-24 DIAGNOSIS — Z78.9 DIFFICULTY WITH ACTIVITIES OF DAILY LIVING: ICD-10-CM

## 2017-08-24 DIAGNOSIS — R42 VERTIGO: Primary | ICD-10-CM

## 2017-08-24 PROCEDURE — 97110 THERAPEUTIC EXERCISES: CPT | Performed by: PHYSICAL THERAPIST

## 2017-08-24 PROCEDURE — 97112 NEUROMUSCULAR REEDUCATION: CPT | Performed by: PHYSICAL THERAPIST

## 2017-08-24 NOTE — THERAPY TREATMENT NOTE
Outpatient Physical Therapy Vestibular Treatment Note  [facility]     Patient Name: Emerita Bazan  : 1935  MRN: [id number]  Today's Date: 2017      Visit Date: 2017    Visit Dx:     ICD-10-CM ICD-9-CM   1. Vertigo R42 780.4   2. Difficulty with activities of daily living R53.81 799.3       Patient Active Problem List   Diagnosis   • Atopic rhinitis   • Bunion   • Gastroesophageal reflux disease   • Hammer toe   • Hyperlipidemia   • Hypothyroidism   • Neuralgia   • Melanocytic nevus   • Seborrheic keratosis             Vestibular Eval       17 1700          Positional Testing    Lakhwinder-Hallpike Right No nystagmus  -MP      Lakhwinder-Hallpike Left No nystagmus  -MP      Horizontal Roll Test Right No nystagmus  -MP      Horizontal Roll Test Left No nystagmus  -MP        User Key  (r) = Recorded By, (t) = Taken By, (c) = Cosigned By    Initials Name Provider Type    RUFINO Schaeffer PT Physical Therapist                PT Assessment/Plan       17 1802       PT Assessment    Assessment Comments BPPV testing, including R Lakhwinder-Hallpike, were negative today.  She is having very minor vertigo with positional changes but due to the pain in her spine and pelvis habituation exercises, which require a lot of body movements which could provoke increased pain, were not issued.  She may be a good candidate for evaluation and treatment of her spine and pelvis and begin aqua therapy for that issue.  -MP     PT Plan    PT Plan Comments To see again next week.  -MP       User Key  (r) = Recorded By, (t) = Taken By, (c) = Cosigned By    Initials Name Provider Type    RUFINO Schaeffer PT Physical Therapist                     PT OP Goals       17 1800       PT Short Term Goals    STG Date to Achieve 17  -MP     STG 1 Retesting for BPPV is performed on second visit.  -MP     STG 1 Progress Met  -MP     STG 2 If symptoms present on second visit will assign home activites for self treatment.  -MP      STG 2 Progress Met  -MP     Long Term Goals    LTG Date to Achieve 09/27/17  -MP     LTG 1 No symptoms of vertigo are present.  -MP     LTG 1 Progress Ongoing  -MP     LTG 2 Emerita is independent with all self care education.  -MP     LTG 2 Progress Ongoing  -MP     LTG 3 Dizziness Handicap disability is below 10%.  -MP     LTG 3 Progress Ongoing  -MP       User Key  (r) = Recorded By, (t) = Taken By, (c) = Cosigned By    Initials Name Provider Type    RUFINO Schaeffer PT Physical Therapist                Therapy Education       08/24/17 1800          Therapy Education    Education Details Due to her pain in the pelvic area, mid thoracic and lumbar spine no habituation exercises prescribed at this time.  -MP        User Key  (r) = Recorded By, (t) = Taken By, (c) = Cosigned By    Initials Name Provider Type    RUFINO Schaeffer PT Physical Therapist                Time Calculation:   Start Time: 1630  Stop Time: 1715  Time Calculation (min): 45 min     Therapy Charges for Today     Code Description Service Date Service Provider Modifiers Qty    97099177705  PT NEUROMUSC RE EDUCATION EA 15 MIN 8/24/2017 Jordan Schaeffer PT GP 2    [id number]  PT THER PROC EA 15 MIN 8/24/2017 Jordan Schaeffer PT GP 1          Jordan Schaeffer PT  8/24/2017

## 2017-08-28 ENCOUNTER — HOSPITAL ENCOUNTER (OUTPATIENT)
Dept: PHYSICAL THERAPY | Facility: HOSPITAL | Age: 82
Setting detail: THERAPIES SERIES
Discharge: HOME | End: 2017-08-28

## 2017-08-28 DIAGNOSIS — R26.2 DIFFICULTY WALKING: ICD-10-CM

## 2017-08-28 DIAGNOSIS — M54.50 LOW BACK PAIN RADIATING DOWN LEG: ICD-10-CM

## 2017-08-28 DIAGNOSIS — M79.606 LOW BACK PAIN RADIATING DOWN LEG: ICD-10-CM

## 2017-08-28 DIAGNOSIS — Z78.9 DIFFICULTY WITH ACTIVITIES OF DAILY LIVING: Primary | ICD-10-CM

## 2017-08-28 PROCEDURE — G8978 MOBILITY CURRENT STATUS: HCPCS | Performed by: PHYSICAL THERAPIST

## 2017-08-28 PROCEDURE — G8979 MOBILITY GOAL STATUS: HCPCS | Performed by: PHYSICAL THERAPIST

## 2017-08-28 PROCEDURE — 97110 THERAPEUTIC EXERCISES: CPT | Performed by: PHYSICAL THERAPIST
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
## 2017-08-29 ENCOUNTER — HOSPITAL ENCOUNTER (OUTPATIENT)
Dept: PHYSICAL THERAPY | Facility: HOSPITAL | Age: 82
Setting detail: THERAPIES SERIES
Discharge: HOME | End: 2017-08-29

## 2017-08-29 DIAGNOSIS — Z78.9 DIFFICULTY WITH ACTIVITIES OF DAILY LIVING: Primary | ICD-10-CM

## 2017-08-29 DIAGNOSIS — R26.2 DIFFICULTY WALKING: ICD-10-CM

## 2017-08-29 DIAGNOSIS — M79.606 LOW BACK PAIN RADIATING DOWN LEG: ICD-10-CM

## 2017-08-29 DIAGNOSIS — M54.50 LOW BACK PAIN RADIATING DOWN LEG: ICD-10-CM

## 2017-08-29 PROCEDURE — 97113 AQUATIC THERAPY/EXERCISES: CPT | Performed by: PHYSICAL THERAPIST
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
## 2017-08-30 ENCOUNTER — APPOINTMENT (OUTPATIENT)
Dept: PHYSICAL THERAPY | Facility: HOSPITAL | Age: 82
End: 2017-08-30

## 2017-09-01 ENCOUNTER — TELEPHONE (OUTPATIENT)
Dept: FAMILY MEDICINE CLINIC | Facility: CLINIC | Age: 82
End: 2017-09-01

## 2017-09-01 DIAGNOSIS — E03.9 ACQUIRED HYPOTHYROIDISM: ICD-10-CM

## 2017-09-01 DIAGNOSIS — E78.5 HYPERLIPIDEMIA, UNSPECIFIED HYPERLIPIDEMIA TYPE: ICD-10-CM

## 2017-09-01 DIAGNOSIS — S69.92XS WRIST INJURY, LEFT, SEQUELA: Primary | ICD-10-CM
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
## 2017-09-05 ENCOUNTER — HOSPITAL ENCOUNTER (OUTPATIENT)
Dept: PHYSICAL THERAPY | Facility: HOSPITAL | Age: 82
Setting detail: THERAPIES SERIES
Discharge: HOME | End: 2017-09-05

## 2017-09-05 DIAGNOSIS — M79.606 LOW BACK PAIN RADIATING DOWN LEG: ICD-10-CM

## 2017-09-05 DIAGNOSIS — M54.50 LOW BACK PAIN RADIATING DOWN LEG: ICD-10-CM

## 2017-09-05 DIAGNOSIS — R42 VERTIGO: ICD-10-CM

## 2017-09-05 DIAGNOSIS — Z78.9 DIFFICULTY WITH ACTIVITIES OF DAILY LIVING: Primary | ICD-10-CM

## 2017-09-05 DIAGNOSIS — R26.2 DIFFICULTY WALKING: ICD-10-CM

## 2017-09-05 PROCEDURE — 97110 THERAPEUTIC EXERCISES: CPT | Performed by: PHYSICAL THERAPIST

## 2017-09-05 NOTE — THERAPY TREATMENT NOTE
Outpatient Physical Therapy Ortho Treatment Note  [facility]     Patient Name: Emerita Bazan  : 1935  MRN: [id number]  Today's Date: 2017      Visit Date: 2017    Visit Dx:    ICD-10-CM ICD-9-CM   1. Difficulty with activities of daily living R53.81 799.3   2. Difficulty walking R26.2 719.7   3. Low back pain radiating down leg M54.5 724.2   4. Vertigo R42 780.4       Patient Active Problem List   Diagnosis   • Atopic rhinitis   • Bunion   • Gastroesophageal reflux disease   • Hammer toe   • Hyperlipidemia   • Hypothyroidism   • Neuralgia   • Melanocytic nevus   • Seborrheic keratosis        Past Medical History:   Diagnosis Date   • Arthritis    • Hypothyroid    • Osteoporosis         Past Surgical History:   Procedure Laterality Date   • BUNIONECTOMY     • EYE SURGERY      cataracts             PT Assessment/Plan       17 1032       PT Assessment    Assessment Comments Emerita tolerated today's treatment well.  Spinal related home exercises were begun.  -MP     PT Plan    PT Plan Comments Monitor the effects of today's session and continue progressing as tolerated.  -MP       User Key  (r) = Recorded By, (t) = Taken By, (c) = Cosigned By    Initials Name Provider Type    RUFINO Schaeffer PT Physical Therapist                Exercises       17 0900          Subjective Comments    Subjective Comments Emerita reports having no vertigo since our last visit and her low back is improving but she is on medication  -MP      Subjective Pain    Able to rate subjective pain? yes  -MP      Pre-Treatment Pain Level 3  -MP      Aquatics    Aquatics performed? No  -MP      Exercise 2    Exercise Name 2 Began spine related exercises in hooklying to include lumbar bracing 10 s x 15, wand flexion x 10 and craniovertebral flexion x 20.  -MP        User Key  (r) = Recorded By, (t) = Taken By, (c) = Cosigned By    Initials Name Provider Type    RUFINO Schaeffer PT Physical Therapist                PT OP  Goals       09/05/17 1000       PT Short Term Goals    STG Date to Achieve 09/13/17  -MP     STG 1 Retesting for BPPV is performed on second visit.  -MP     STG 1 Progress Met  -MP     STG 2 If symptoms present on second visit will assign home activites for self treatment.  -MP     STG 2 Progress Met  -MP     STG 3 Aqua therapy exercises are begun and L spine related symptoms are not worsened.after her first session.  -MP     STG 3 Progress Met  -MP     STG 4 Standing L spine flexion is reduced moderatedly.  -MP     STG 4 Progress Ongoing  -MP     Long Term Goals    LTG Date to Achieve 09/27/17  -MP     LTG 1 No symptoms of vertigo are present.  -MP     LTG 1 Progress Ongoing  -MP     LTG 2 Emerita is independent with all self care education.  -MP     LTG 2 Progress Ongoing  -MP     LTG 3 Dizziness Handicap disability is below 10%.  -MP     LTG 3 Progress Ongoing  -MP     LTG 4 Pain no longer present in the L LE or buttock.  -MP     LTG 4 Progress Ongoing  -MP     LTG 5 MMT of L dorsiflexion is 4+/5 and equal to the R  -MP     LTG 5 Progress Ongoing  -MP     LTG 6 PSFS disabiilty is reduced to 25%.  -MP     LTG 6 Progress Ongoing  -MP       User Key  (r) = Recorded By, (t) = Taken By, (c) = Cosigned By    Initials Name Provider Type    RUFINO Schaeffer PT Physical Therapist                Therapy Education       09/05/17 1031          Therapy Education    Education Details Issued lumbar bracing, wand flexion and craniovertebral flexion in hooklying.  -MP      Given HEP  -MP      Program New  -MP      How Provided Written  -MP      Provided to Patient  -MP      Level of Understanding Teach back education performed  -MP        User Key  (r) = Recorded By, (t) = Taken By, (c) = Cosigned By    Initials Name Provider Type    RUFINO Schaeffer PT Physical Therapist                Time Calculation:   Start Time: 0845  Stop Time: 0930  Time Calculation (min): 45 min    Therapy Charges for Today     Code Description Service  Date Service Provider Modifiers Qty    95222785830 HC PT THER PROC EA 15 MIN 9/5/2017 Jordan Schaeffer, PT GP 3        Jordan Schaeffer, PT  9/5/2017

## 2017-09-07 ENCOUNTER — APPOINTMENT (OUTPATIENT)
Dept: PHYSICAL THERAPY | Facility: HOSPITAL | Age: 82
End: 2017-09-07

## 2017-09-08 ENCOUNTER — RESULTS ENCOUNTER (OUTPATIENT)
Dept: FAMILY MEDICINE CLINIC | Facility: CLINIC | Age: 82
End: 2017-09-08

## 2017-09-08 DIAGNOSIS — D75.89 MACROCYTOSIS WITHOUT ANEMIA: ICD-10-CM

## 2017-09-11 ENCOUNTER — LAB (OUTPATIENT)
Dept: FAMILY MEDICINE CLINIC | Facility: CLINIC | Age: 82
End: 2017-09-11

## 2017-09-11 DIAGNOSIS — R39.9 UTI SYMPTOMS: Primary | ICD-10-CM

## 2017-09-11 LAB
BASOPHILS # BLD AUTO: 0.07 10*3/MM3 (ref 0–0.2)
BASOPHILS NFR BLD AUTO: 0.9 % (ref 0–1.5)
BILIRUB BLD-MCNC: NEGATIVE MG/DL
CLARITY, POC: CLEAR
COLOR UR: YELLOW
EOSINOPHIL # BLD AUTO: 0.35 10*3/MM3 (ref 0–0.7)
EOSINOPHIL NFR BLD AUTO: 4.4 % (ref 0.3–6.2)
ERYTHROCYTE [DISTWIDTH] IN BLOOD BY AUTOMATED COUNT: 14.4 % (ref 11.7–13)
GLUCOSE UR STRIP-MCNC: NEGATIVE MG/DL
HCT VFR BLD AUTO: 37.3 % (ref 35.6–45.5)
HGB BLD-MCNC: 11.5 G/DL (ref 11.9–15.5)
IMM GRANULOCYTES # BLD: 0.02 10*3/MM3 (ref 0–0.03)
IMM GRANULOCYTES NFR BLD: 0.3 % (ref 0–0.5)
KETONES UR QL: NEGATIVE
LEUKOCYTE EST, POC: NEGATIVE
LYMPHOCYTES # BLD AUTO: 2.4 10*3/MM3 (ref 0.9–4.8)
LYMPHOCYTES NFR BLD AUTO: 30.1 % (ref 19.6–45.3)
MCH RBC QN AUTO: 32.2 PG (ref 26.9–32)
MCHC RBC AUTO-ENTMCNC: 30.8 G/DL (ref 32.4–36.3)
MCV RBC AUTO: 104.5 FL (ref 80.5–98.2)
MONOCYTES # BLD AUTO: 0.76 10*3/MM3 (ref 0.2–1.2)
MONOCYTES NFR BLD AUTO: 9.5 % (ref 5–12)
NEUTROPHILS # BLD AUTO: 4.37 10*3/MM3 (ref 1.9–8.1)
NEUTROPHILS NFR BLD AUTO: 54.8 % (ref 42.7–76)
NITRITE UR-MCNC: NEGATIVE MG/ML
PH UR: 7 [PH] (ref 5–8)
PLATELET # BLD AUTO: 354 10*3/MM3 (ref 140–500)
PROT UR STRIP-MCNC: NEGATIVE MG/DL
RBC # BLD AUTO: 3.57 10*6/MM3 (ref 3.9–5.2)
RBC # UR STRIP: NEGATIVE /UL
SP GR UR: 1.02 (ref 1–1.03)
UROBILINOGEN UR QL: NORMAL
WBC # BLD AUTO: 7.97 10*3/MM3 (ref 4.5–10.7)

## 2017-09-12 ENCOUNTER — APPOINTMENT (OUTPATIENT)
Dept: PHYSICAL THERAPY | Facility: HOSPITAL | Age: 82
End: 2017-09-12

## 2017-09-14 ENCOUNTER — OFFICE VISIT (OUTPATIENT)
Dept: FAMILY MEDICINE CLINIC | Facility: CLINIC | Age: 82
End: 2017-09-14

## 2017-09-14 ENCOUNTER — HOSPITAL ENCOUNTER (OUTPATIENT)
Dept: PHYSICAL THERAPY | Facility: HOSPITAL | Age: 82
Setting detail: THERAPIES SERIES
Discharge: HOME | End: 2017-09-14

## 2017-09-14 VITALS
HEIGHT: 59 IN | TEMPERATURE: 97.9 F | SYSTOLIC BLOOD PRESSURE: 120 MMHG | DIASTOLIC BLOOD PRESSURE: 80 MMHG | RESPIRATION RATE: 16 BRPM | HEART RATE: 83 BPM

## 2017-09-14 DIAGNOSIS — M54.50 LOW BACK PAIN RADIATING DOWN LEG: ICD-10-CM

## 2017-09-14 DIAGNOSIS — R42 VERTIGO: ICD-10-CM
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
## 2017-09-14 DIAGNOSIS — M79.606 LOW BACK PAIN RADIATING DOWN LEG: ICD-10-CM

## 2017-09-14 DIAGNOSIS — Z78.9 DIFFICULTY WITH ACTIVITIES OF DAILY LIVING: Primary | ICD-10-CM

## 2017-09-14 DIAGNOSIS — D75.89 MACROCYTOSIS: Primary | ICD-10-CM

## 2017-09-14 DIAGNOSIS — Z23 IMMUNIZATION DUE: ICD-10-CM

## 2017-09-14 DIAGNOSIS — R26.2 DIFFICULTY WALKING: ICD-10-CM

## 2017-09-14 PROCEDURE — 90670 PCV13 VACCINE IM: CPT | Performed by: INTERNAL MEDICINE

## 2017-09-14 PROCEDURE — 97110 THERAPEUTIC EXERCISES: CPT | Performed by: PHYSICAL THERAPIST

## 2017-09-14 PROCEDURE — G0009 ADMIN PNEUMOCOCCAL VACCINE: HCPCS | Performed by: INTERNAL MEDICINE

## 2017-09-14 PROCEDURE — 99213 OFFICE O/P EST LOW 20 MIN: CPT | Performed by: INTERNAL MEDICINE
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
## 2017-09-14 NOTE — THERAPY TREATMENT NOTE
Outpatient Physical Therapy Ortho Progress Note  [facility]     Patient Name: Emerita Bazan  : 1935  MRN: [id number]  Today's Date: 2017      Visit Date: 2017    Visit Dx:    ICD-10-CM ICD-9-CM   1. Difficulty with activities of daily living R53.81 799.3   2. Difficulty walking R26.2 719.7   3. Vertigo R42 780.4   4. Low back pain radiating down leg M54.5 724.2       Patient Active Problem List   Diagnosis   • Atopic rhinitis   • Bunion   • Gastroesophageal reflux disease   • Hammer toe   • Hyperlipidemia   • Hypothyroidism   • Neuralgia   • Melanocytic nevus   • Seborrheic keratosis        Past Medical History:   Diagnosis Date   • Arthritis    • Hypothyroid    • Osteoporosis         Past Surgical History:   Procedure Laterality Date   • BUNIONECTOMY     • EYE SURGERY      cataracts             PT Assessment/Plan       17 1418       PT Assessment    Functional Limitations Impaired locomotion;Limitations in community activities;Limitations in functional capacity and performance  -MP     Impairments Poor body mechanics;Posture;Pain;Endurance;Range of motion;Impaired flexibility  -MP     Assessment Comments Emerita is improving.  She would benefit from learing appropriate therapeutic exercises in warm water for continued relief of pain, improving mobility of the spine and for self care in the future.  After she is finished with water therapy, a few further land visits would benefit her for postural strengthening in closed chain situtation.  -MP     Please refer to paper survey for additional self-reported information No  -MP     Rehab Potential Good  -MP     Patient/caregiver participated in establishment of treatment plan and goals Yes  -MP     PT Plan    PT Frequency 2x/week  -MP     Predicted Duration of Therapy Intervention (days/wks) 4-6 weeks  -MP     Planned CPT's? PT EVAL LOW COMPLEXITY: 21422;PT RE-EVAL: 39316;PT MANUAL THERAPY EA 15 MIN: 51284;PT AQUATIC THERAPY EA 15 MIN:  31930;PT THER PROC EA 15 MIN: 98738;PT ELECTRICAL STIM UNATTEND: ;PT ULTRASOUND EA 15 MIN: 16689  -MP     PT Plan Comments Emerita begins 3 weeks of aqua therapy next visit.  -MP       User Key  (r) = Recorded By, (t) = Taken By, (c) = Cosigned By    Initials Name Provider Type    RUFINO Schaeffer PT Physical Therapist                Exercises       09/14/17 1300          Subjective Comments    Subjective Comments Emerita reports continued lack of vertigo symptoms and for 3 days no pain in the lumbosacral area.  -MP      Subjective Pain    Able to rate subjective pain? yes  -MP      Pre-Treatment Pain Level 0  -MP      Post-Treatment Pain Level 0  -MP      Aquatics    Aquatics performed? No  -MP      Exercise 2    Exercise Name 2 Refert to land flow sheet  -MP      Exercise 3    Exercise Name 3 Progressed therapeutic exercises with SKTC stretch and standing scapular row with green theraband.  -MP      Exercise 4    Exercise Name 4 Observed improved lumbar flexion, moderate loss, as she bent forward to get her shoes off  of the ground.  -MP        User Key  (r) = Recorded By, (t) = Taken By, (c) = Cosigned By    Initials Name Provider Type    RUFINO Schaeffer PT Physical Therapist                PT OP Goals       09/14/17 1400       PT Short Term Goals    STG Date to Achieve 09/13/17  -MP     STG 1 Retesting for BPPV is performed on second visit.  -MP     STG 1 Progress Met  -MP     STG 2 If symptoms present on second visit will assign home activites for self treatment.  -MP     STG 2 Progress Met  -MP     STG 3 Aqua therapy exercises are begun and L spine related symptoms are not worsened.after her first session.  -MP     STG 3 Progress Met  -MP     STG 4 Standing L spine flexion is reduced moderatedly.  -MP     STG 4 Progress Met  -MP     Long Term Goals    LTG Date to Achieve 09/27/17  -MP     LTG 1 No symptoms of vertigo are present.  -MP     LTG 1 Progress Ongoing  -MP     LTG 2 Emerita is independent with all  self care education.  -MP     LTG 2 Progress Ongoing  -MP     LTG 3 Dizziness Handicap disability is below 10%.  -MP     LTG 3 Progress Ongoing  -MP     LTG 4 Pain no longer present in the L LE or buttock.  -MP     LTG 4 Progress Ongoing  -MP     LTG 5 MMT of L dorsiflexion is 4+/5 and equal to the R  -MP     LTG 5 Progress Ongoing  -MP     LTG 6 PSFS disabiilty is reduced to 25%.  -MP     LTG 6 Progress Ongoing  -MP       User Key  (r) = Recorded By, (t) = Taken By, (c) = Cosigned By    Initials Name Provider Type    RUFINO Schaeffer PT Physical Therapist                Therapy Education       09/14/17 1417          Therapy Education    Education Details Progressed her HEP with SKTC stretch and standing scapular row, green theraband issued.  -MP      Given HEP  -MP      Program New  -MP      How Provided Written  -MP      Provided to Patient  -MP      Level of Understanding Teach back education performed  -MP        User Key  (r) = Recorded By, (t) = Taken By, (c) = Cosigned By    Initials Name Provider Type    RUFINO Schaeffer PT Physical Therapist        Time Calculation:   Start Time: 1330  Stop Time: 1415  Time Calculation (min): 45 min    Therapy Charges for Today     Code Description Service Date Service Provider Modifiers Qty    63159101819 HC PT THER PROC EA 15 MIN 9/14/2017 Jordan Schaeffer PT GP 3          Jordan Schaeffer PT  9/14/2017

## 2017-09-18 ENCOUNTER — HOSPITAL ENCOUNTER (OUTPATIENT)
Dept: PHYSICAL THERAPY | Facility: HOSPITAL | Age: 82
Setting detail: THERAPIES SERIES
Discharge: HOME | End: 2017-09-18

## 2017-09-18 ENCOUNTER — RESULTS ENCOUNTER (OUTPATIENT)
Dept: FAMILY MEDICINE CLINIC | Facility: CLINIC | Age: 82
End: 2017-09-18

## 2017-09-18 DIAGNOSIS — Z78.9 DIFFICULTY WITH ACTIVITIES OF DAILY LIVING: Primary | ICD-10-CM

## 2017-09-18 DIAGNOSIS — E78.5 HYPERLIPIDEMIA, UNSPECIFIED HYPERLIPIDEMIA TYPE: ICD-10-CM

## 2017-09-18 DIAGNOSIS — E03.9 ACQUIRED HYPOTHYROIDISM: ICD-10-CM

## 2017-09-18 DIAGNOSIS — M54.50 LOW BACK PAIN RADIATING DOWN LEG: ICD-10-CM

## 2017-09-18 DIAGNOSIS — R26.2 DIFFICULTY WALKING: ICD-10-CM

## 2017-09-18 DIAGNOSIS — M79.606 LOW BACK PAIN RADIATING DOWN LEG: ICD-10-CM

## 2017-09-18 PROCEDURE — 97113 AQUATIC THERAPY/EXERCISES: CPT | Performed by: PHYSICAL THERAPIST

## 2017-09-18 NOTE — THERAPY TREATMENT NOTE
Outpatient Physical Therapy Ortho Treatment Note  [facility]     Patient Name: Emerita Bazan  : 1935  MRN: [id number]  Today's Date: 2017      Visit Date: 2017    Visit Dx:    ICD-10-CM ICD-9-CM   1. Difficulty with activities of daily living R53.81 799.3   2. Difficulty walking R26.2 719.7   3. Low back pain radiating down leg M54.5 724.2       Patient Active Problem List   Diagnosis   • Atopic rhinitis   • Bunion   • Gastroesophageal reflux disease   • Hammer toe   • Hyperlipidemia   • Hypothyroidism   • Neuralgia   • Melanocytic nevus   • Seborrheic keratosis        Past Medical History:   Diagnosis Date   • Arthritis    • Hypothyroid    • Osteoporosis         Past Surgical History:   Procedure Laterality Date   • BUNIONECTOMY     • EYE SURGERY      cataracts                             PT Assessment/Plan       17 1110       PT Assessment    Assessment Comments Overall improving, taking tylenol to manage pain. Balance improved in pool from last visit as Emerita is now able to walk across pool with SBA and performed tandem walk with hand support on rail.  -MARCELINO       User Key  (r) = Recorded By, (t) = Taken By, (c) = Cosigned By    Initials Name Provider Type    MARCELINO Waters, PT Physical Therapist                    Exercises       17 1000          Subjective Comments    Subjective Comments I did my home exercises this morning.  -MARCELINO      Subjective Pain    Able to rate subjective pain? yes  -MARCELINO      Pre-Treatment Pain Level 0  -MARCELINO      Post-Treatment Pain Level 0  -MARCELINO      Subjective Pain Comment I took tylenol very early this morning.  -MARCELINO      Aquatics    Aquatics performed? Yes  -MARCELINO      Exercise 1    Exercise Name 1 Aquatic exercise as noted on flow sheet.  -MARCELINO      Cueing 1 Verbal;Demo  -MARCELINO        User Key  (r) = Recorded By, (t) = Taken By, (c) = Cosigned By    Initials Name Provider Type    MARCELINO Waters, PT Physical Therapist                                        Time Calculation:   Start Time: 1033  Stop Time: 1115  Time Calculation (min): 39 min    Therapy Charges for Today     Code Description Service Date Service Provider Modifiers Qty    31026758268 HC PT AQUATIC THERAPY EA 15 MIN 9/18/2017 Gerson Waters, PT GP 3                    Gerson Waters, PT  9/18/2017

## 2017-09-20 ENCOUNTER — HOSPITAL ENCOUNTER (OUTPATIENT)
Dept: PHYSICAL THERAPY | Facility: HOSPITAL | Age: 82
Setting detail: THERAPIES SERIES
Discharge: HOME | End: 2017-09-20

## 2017-09-20 DIAGNOSIS — R26.2 DIFFICULTY WALKING: ICD-10-CM

## 2017-09-20 DIAGNOSIS — M79.606 LOW BACK PAIN RADIATING DOWN LEG: Primary | ICD-10-CM

## 2017-09-20 DIAGNOSIS — Z78.9 DIFFICULTY WITH ACTIVITIES OF DAILY LIVING: ICD-10-CM

## 2017-09-20 DIAGNOSIS — M54.50 LOW BACK PAIN RADIATING DOWN LEG: Primary | ICD-10-CM

## 2017-09-20 PROCEDURE — 97113 AQUATIC THERAPY/EXERCISES: CPT | Performed by: PHYSICAL THERAPIST

## 2017-09-20 NOTE — THERAPY TREATMENT NOTE
Outpatient Physical Therapy Ortho Treatment Note  [facility]     Patient Name: Emerita Bazan  : 1935  MRN: [id number]  Today's Date: 2017      Visit Date: 2017    Visit Dx:    ICD-10-CM ICD-9-CM   1. Low back pain radiating down leg M54.5 724.2   2. Difficulty with activities of daily living R53.81 799.3   3. Difficulty walking R26.2 719.7       Patient Active Problem List   Diagnosis   • Atopic rhinitis   • Bunion   • Gastroesophageal reflux disease   • Hammer toe   • Hyperlipidemia   • Hypothyroidism   • Neuralgia   • Melanocytic nevus   • Seborrheic keratosis        Past Medical History:   Diagnosis Date   • Arthritis    • Hypothyroid    • Osteoporosis         Past Surgical History:   Procedure Laterality Date   • BUNIONECTOMY     • EYE SURGERY      cataracts                             PT Assessment/Plan       17 1126       PT Assessment    Assessment Comments Emerita reports no leg pain for the past few days and a little achey after last aquatic session. Attended social event out of town.  -MARCELINO       User Key  (r) = Recorded By, (t) = Taken By, (c) = Cosigned By    Initials Name Provider Type    MARCELINO Waters, PT Physical Therapist                    Exercises       17 1000          Subjective Comments    Subjective Comments Drove a friend to [place], [place] to go to a museum there and have lunch. A little tired after.  -MARCELINO      Subjective Pain    Able to rate subjective pain? yes  -MARCELINO      Pre-Treatment Pain Level 2  -MARCELINO      Subjective Pain Comment I was achey and tired after the last pool session. I haven't had any leg pain for a few days.  -MARCELINO      Aquatics    Aquatics performed? Yes  -MARCELINO      Exercise 1    Exercise Name 1 Aquatic exercise as noted on flow sheer.   -MARCELINO      Cueing 1 Verbal;Demo  -MARCELINO        User Key  (r) = Recorded By, (t) = Taken By, (c) = Cosigned By    Initials Name Provider Type    MARCELINO Waters, PT Physical Therapist                                        Time Calculation:   Start Time: 1030  Stop Time: 1108  Time Calculation (min): 38 min    Therapy Charges for Today     Code Description Service Date Service Provider Modifiers Qty    67863885535 HC PT AQUATIC THERAPY EA 15 MIN 9/20/2017 Gerson Waters, PT GP 3                    Gerson Waters, PT  9/20/2017

## 2017-09-25 ENCOUNTER — HOSPITAL ENCOUNTER (OUTPATIENT)
Dept: PHYSICAL THERAPY | Facility: HOSPITAL | Age: 82
Setting detail: THERAPIES SERIES
Discharge: HOME | End: 2017-09-25

## 2017-09-25 DIAGNOSIS — Z78.9 DIFFICULTY WITH ACTIVITIES OF DAILY LIVING: ICD-10-CM

## 2017-09-25 DIAGNOSIS — R26.2 DIFFICULTY WALKING: ICD-10-CM

## 2017-09-25 DIAGNOSIS — M54.50 LOW BACK PAIN RADIATING DOWN LEG: Primary | ICD-10-CM

## 2017-09-25 DIAGNOSIS — M79.606 LOW BACK PAIN RADIATING DOWN LEG: Primary | ICD-10-CM

## 2017-09-25 PROCEDURE — 97113 AQUATIC THERAPY/EXERCISES: CPT | Performed by: PHYSICAL THERAPIST

## 2017-09-25 NOTE — THERAPY TREATMENT NOTE
Outpatient Physical Therapy Ortho Treatment Note  [facility]     Patient Name: Emerita Bazan  : 1935  MRN: [id number]  Today's Date: 2017      Visit Date: 2017    Visit Dx:    ICD-10-CM ICD-9-CM   1. Low back pain radiating down leg M54.5 724.2   2. Difficulty with activities of daily living R53.81 799.3   3. Difficulty walking R26.2 719.7       Patient Active Problem List   Diagnosis   • Atopic rhinitis   • Bunion   • Gastroesophageal reflux disease   • Hammer toe   • Hyperlipidemia   • Hypothyroidism   • Neuralgia   • Melanocytic nevus   • Seborrheic keratosis        Past Medical History:   Diagnosis Date   • Arthritis    • Hypothyroid    • Osteoporosis         Past Surgical History:   Procedure Laterality Date   • BUNIONECTOMY     • EYE SURGERY      cataracts                             PT Assessment/Plan       17 1253       PT Assessment    Assessment Comments No leg pain reported for past week, some LB and buttock pain that is controlled with tylenol.  Some soreness and fatigue after aquatic therapy.  -MARCELINO       User Key  (r) = Recorded By, (t) = Taken By, (c) = Cosigned By    Initials Name Provider Type    MARCELINO Waters, PT Physical Therapist                    Exercises       17 1000          Subjective Pain    Able to rate subjective pain? yes  -MARCELINO      Pre-Treatment Pain Level 0  -MARCELINO      Post-Treatment Pain Level 0  -MARCELINO      Subjective Pain Comment Slept well over the weekend. feels good currently with medication (tylenol). No leg pain in the past week, does get buttock pain if she doesnt't take tylenol. Was sore after last aquatic session.  -MARCELINO      Aquatics    Aquatics performed? Yes  -MARCEILNO      Exercise 1    Exercise Name 1 Aquatic exercise as noted on flow sheet.  -MARCELINO        User Key  (r) = Recorded By, (t) = Taken By, (c) = Cosigned By    Initials Name Provider Type    MARCELINO Watesr, PT Physical Therapist                                       Time Calculation:    Start Time: 1035  Stop Time: 1115  Time Calculation (min): 40 min    Therapy Charges for Today     Code Description Service Date Service Provider Modifiers Qty    43529474702 HC PT AQUATIC THERAPY EA 15 MIN 9/25/2017 Gerson Waters, PT GP 3                    Gerson Waters, PT  9/25/2017

## 2017-09-27 ENCOUNTER — HOSPITAL ENCOUNTER (OUTPATIENT)
Dept: PHYSICAL THERAPY | Facility: HOSPITAL | Age: 82
Setting detail: THERAPIES SERIES
Discharge: HOME | End: 2017-09-27

## 2017-09-27 DIAGNOSIS — Z78.9 DIFFICULTY WITH ACTIVITIES OF DAILY LIVING: ICD-10-CM

## 2017-09-27 DIAGNOSIS — R26.2 DIFFICULTY WALKING: ICD-10-CM

## 2017-09-27 DIAGNOSIS — M54.50 LOW BACK PAIN RADIATING DOWN LEG: Primary | ICD-10-CM

## 2017-09-27 DIAGNOSIS — M79.606 LOW BACK PAIN RADIATING DOWN LEG: Primary | ICD-10-CM

## 2017-09-27 PROCEDURE — 97113 AQUATIC THERAPY/EXERCISES: CPT | Performed by: PHYSICAL THERAPIST

## 2017-09-27 NOTE — THERAPY TREATMENT NOTE
Outpatient Physical Therapy Ortho Treatment Note  [facility]     Patient Name: Emerita Bazan  : 1935  MRN: [id number]  Today's Date: 2017      Visit Date: 2017    Visit Dx:    ICD-10-CM ICD-9-CM   1. Low back pain radiating down leg M54.5 724.2   2. Difficulty with activities of daily living R53.81 799.3   3. Difficulty walking R26.2 719.7       Patient Active Problem List   Diagnosis   • Atopic rhinitis   • Bunion   • Gastroesophageal reflux disease   • Hammer toe   • Hyperlipidemia   • Hypothyroidism   • Neuralgia   • Melanocytic nevus   • Seborrheic keratosis        Past Medical History:   Diagnosis Date   • Arthritis    • Hypothyroid    • Osteoporosis         Past Surgical History:   Procedure Laterality Date   • BUNIONECTOMY     • EYE SURGERY      cataracts                             PT Assessment/Plan       17 1105       PT Assessment    Assessment Comments Patient reports not taking any pain medicine this week. She states she has some back pain that comes and goes. We have been gradually progressing her aquatic program.   -MARCELINO       User Key  (r) = Recorded By, (t) = Taken By, (c) = Cosigned By    Initials Name Provider Type    MARCELINO Waters, PT Physical Therapist                    Exercises       17 1000          Subjective Pain    Able to rate subjective pain? yes  -MARCELINO      Pre-Treatment Pain Level 0  -MARCELINO      Post-Treatment Pain Level 0  -MARCELINO      Subjective Pain Comment I feel good today, I haven't taken any pain medicine this week.  -MARCELINO      Aquatics    Aquatics performed? Yes  -MARCELINO      Exercise 1    Exercise Name 1 Refer to aquatic flow sheet for specific exercises performed.  -MARCELINO        User Key  (r) = Recorded By, (t) = Taken By, (c) = Cosigned By    Initials Name Provider Type    MARCELINO Waters, PT Physical Therapist                                       Time Calculation:   Start Time: 1030  Stop Time: 1115  Time Calculation (min): 45 min    Therapy Charges  for Today     Code Description Service Date Service Provider Modifiers Qty    75091871912 HC PT AQUATIC THERAPY EA 15 MIN 9/27/2017 Gerson Waters, PT GP 3                    Gerson Waters, PT  9/27/2017

## 2017-09-29 ENCOUNTER — TELEPHONE (OUTPATIENT)
Dept: FAMILY MEDICINE CLINIC | Facility: CLINIC | Age: 82
End: 2017-09-29

## 2017-09-29 NOTE — TELEPHONE ENCOUNTER
----- Message from Vandana Boles sent at 9/29/2017  2:12 PM EDT -----  PT WENT TO ORTHO AND SHE DOESN'T QUITE UNDERSTAND WHAT HE MEANT WHEN EXPLAINING EVERYTHING.  SHE WOULD LIKE TO KNOW IF YOU WOULD BE ABLE TO EXPLAIN IT TO HER.  HER NUMBER [phone]  Patient was confused whether not she had a pelvic fracture in addition to her sacral fracture.  I advised that the studies I viewed do not indicate this.  My guess is that the orthopedist is changing pelvic and sacral fracture because the sacrum as part of the pelvis.

## 2017-10-02 ENCOUNTER — HOSPITAL ENCOUNTER (OUTPATIENT)
Dept: PHYSICAL THERAPY | Facility: HOSPITAL | Age: 82
Setting detail: THERAPIES SERIES
Discharge: HOME | End: 2017-10-02

## 2017-10-02 DIAGNOSIS — R26.2 DIFFICULTY WALKING: ICD-10-CM

## 2017-10-02 DIAGNOSIS — M54.50 LOW BACK PAIN RADIATING DOWN LEG: Primary | ICD-10-CM

## 2017-10-02 DIAGNOSIS — M79.606 LOW BACK PAIN RADIATING DOWN LEG: Primary | ICD-10-CM

## 2017-10-02 DIAGNOSIS — Z78.9 DIFFICULTY WITH ACTIVITIES OF DAILY LIVING: ICD-10-CM

## 2017-10-02 PROCEDURE — 97113 AQUATIC THERAPY/EXERCISES: CPT | Performed by: PHYSICAL THERAPIST

## 2017-10-02 NOTE — THERAPY TREATMENT NOTE
Outpatient Physical Therapy Ortho Treatment Note  [facility]     Patient Name: Emerita Bazan  : 1935  MRN: [id number]  Today's Date: 10/2/2017      Visit Date: 10/02/2017    Visit Dx:    ICD-10-CM ICD-9-CM   1. Low back pain radiating down leg M54.5 724.2   2. Difficulty with activities of daily living R53.81 799.3   3. Difficulty walking R26.2 719.7       Patient Active Problem List   Diagnosis   • Atopic rhinitis   • Bunion   • Gastroesophageal reflux disease   • Hammer toe   • Hyperlipidemia   • Hypothyroidism   • Neuralgia   • Melanocytic nevus   • Seborrheic keratosis        Past Medical History:   Diagnosis Date   • Arthritis    • Hypothyroid    • Osteoporosis         Past Surgical History:   Procedure Laterality Date   • BUNIONECTOMY     • EYE SURGERY      cataracts                             PT Assessment/Plan       10/02/17 1104       PT Assessment    Assessment Comments Increased walking tolerance over the weekend, Emerita reports walking 2 miles with break half way through. Sore today, no leg pain.  -MARCELINO       User Key  (r) = Recorded By, (t) = Taken By, (c) = Cosigned By    Initials Name Provider Type    MARCELINO Waters, PT Physical Therapist                    Exercises       10/02/17 1000          Subjective Comments    Subjective Comments I walked to [facility] ( total 2 miles) and walked 1/2 mile yesterday.  -MARCELINO      Subjective Pain    Able to rate subjective pain? yes  -MARCELINO      Pre-Treatment Pain Level 1  -MARCELINO      Post-Treatment Pain Level 0  -MARCELINO      Subjective Pain Comment I'm sore today in LB and buttocks.  -MARCELINO      Aquatics    Aquatics performed? Yes  -MARCELINO      Exercise 1    Exercise Name 1 Refer to flow sheet.  -MARCELINO        User Key  (r) = Recorded By, (t) = Taken By, (c) = Cosigned By    Initials Name Provider Type    MARCELINO Waters, PT Physical Therapist                                       Time Calculation:   Start Time: 1030  Stop Time: 1115  Time Calculation (min): 45  min    Therapy Charges for Today     Code Description Service Date Service Provider Modifiers Qty    28820726950 HC PT AQUATIC THERAPY EA 15 MIN 10/2/2017 Gerson Waters, PT GP 3                    Gerson Waters, PT  10/2/2017

## 2017-10-04 ENCOUNTER — HOSPITAL ENCOUNTER (OUTPATIENT)
Dept: PHYSICAL THERAPY | Facility: HOSPITAL | Age: 82
Setting detail: THERAPIES SERIES
Discharge: HOME | End: 2017-10-04

## 2017-10-04 DIAGNOSIS — M79.606 LOW BACK PAIN RADIATING DOWN LEG: Primary | ICD-10-CM

## 2017-10-04 DIAGNOSIS — M54.50 LOW BACK PAIN RADIATING DOWN LEG: Primary | ICD-10-CM

## 2017-10-04 DIAGNOSIS — Z78.9 DIFFICULTY WITH ACTIVITIES OF DAILY LIVING: ICD-10-CM

## 2017-10-04 PROCEDURE — 97113 AQUATIC THERAPY/EXERCISES: CPT | Performed by: PHYSICAL THERAPIST

## 2017-10-04 NOTE — THERAPY TREATMENT NOTE
Outpatient Physical Therapy Ortho Treatment Note  [facility]     Patient Name: Emerita Bazan  : 1935  MRN: [id number]  Today's Date: 10/4/2017      Visit Date: 10/04/2017    Visit Dx:    ICD-10-CM ICD-9-CM   1. Low back pain radiating down leg M54.5 724.2   2. Difficulty with activities of daily living R53.81 799.3       Patient Active Problem List   Diagnosis   • Atopic rhinitis   • Bunion   • Gastroesophageal reflux disease   • Hammer toe   • Hyperlipidemia   • Hypothyroidism   • Neuralgia   • Melanocytic nevus   • Seborrheic keratosis        Past Medical History:   Diagnosis Date   • Arthritis    • Hypothyroid    • Osteoporosis         Past Surgical History:   Procedure Laterality Date   • BUNIONECTOMY     • EYE SURGERY      cataracts                             PT Assessment/Plan       10/04/17 1125       PT Assessment    Assessment Comments Overall Emerita is making good progress. She reports little to no pain and occasionally takes otc tylenol. Balance improved as well as left ankle strength. Emerita reports she no longer has LLE pain. She will return to [facility] for further treatment and  instruction to include proper body mechanics.  -MARCELINO       User Key  (r) = Recorded By, (t) = Taken By, (c) = Cosigned By    Initials Name Provider Type    MARCELINO Waters, PT Physical Therapist                    Exercises       10/04/17 1000          Subjective Comments    Subjective Comments Walked yesterday but not as much as over the weekend. Emerita reports she still is careful with her back with activities such as making the bed and reaching overhead at grocery or to bottom shelf.  -MARCELINO      Subjective Pain    Able to rate subjective pain? yes  -MARCELINO      Pre-Treatment Pain Level 0  -MARCELINO      Post-Treatment Pain Level 0  -MARCELINO      Subjective Pain Comment Rarely takes tylenol now.  Occasional pain in LB and buttock. No LE pain.  -MARCELINO      Aquatics    Aquatics performed? Yes  -MARCELINO      Exercise 1    Exercise Name 1 Refer  to aqua flow sheet. At this time Emerita reports she does not plan to continue the water exercise on her own.  -MARCELINO        User Key  (r) = Recorded By, (t) = Taken By, (c) = Cosigned By    Initials Name Provider Type    MARCELINO Waters, PT Physical Therapist                               PT OP Goals       10/04/17 1000       PT Short Term Goals    STG Date to Achieve 09/13/17  -MARCELINO     STG 1 Retesting for BPPV is performed on second visit.  -MARCELINO     STG 1 Progress Met  -MARCELINO     STG 2 If symptoms present on second visit will assign home activites for self treatment.  -MARCELINO     STG 2 Progress Met  -MARCELINO     STG 3 Aqua therapy exercises are begun and L spine related symptoms are not worsened.after her first session.  -MARCELINO     STG 3 Progress Met  -MARCELINO     STG 4 Standing L spine flexion is reduced moderatedly.  -MARCELINO     STG 4 Progress Met  -MARCELINO     Long Term Goals    LTG Date to Achieve 09/27/17  -MARCELINO     LTG 1 No symptoms of vertigo are present.  -MARCELINO     LTG 1 Progress Met  -MARCELINO     LTG 2 Emerita is independent with all self care education.  -MARCELINO     LTG 2 Progress Partially Met  -MARCELINO     LTG 3 Dizziness Handicap disability is below 10%.  -MARCELINO     LTG 3 Progress Ongoing  -MARCELINO     LTG 4 Pain no longer present in the L LE or buttock.  -MARCELINO     LTG 4 Progress Partially Met  -MARCELINO     LTG 4 Progress Comments Pain no longer in L LE, however has occasional buttock pain.  -MARCELINO     LTG 5 MMT of L dorsiflexion is 4+/5 and equal to the R  -MARCELINO     LTG 5 Progress Met  -MARCELINO     LTG 6 PSFS disabiilty is reduced to 25%.  -MARCELINO     LTG 6 Progress Ongoing  -MARCELINO       User Key  (r) = Recorded By, (t) = Taken By, (c) = Cosigned By    Initials Name Provider Type    MARCELINO Waters, PT Physical Therapist                    Time Calculation:   Start Time: 1036  Stop Time: 1116  Time Calculation (min): 40 min    Therapy Charges for Today     Code Description Service Date Service Provider Modifiers Qty    03617184832  PT AQUATIC THERAPY EA 15 MIN 10/4/2017 Gerson HATCH  Evelin, PT GP 3                    Gerson Waters, PT  10/4/2017

## 2017-10-10 ENCOUNTER — HOSPITAL ENCOUNTER (OUTPATIENT)
Dept: PHYSICAL THERAPY | Facility: HOSPITAL | Age: 82
Setting detail: THERAPIES SERIES
Discharge: HOME | End: 2017-10-10

## 2017-10-10 DIAGNOSIS — M54.50 LOW BACK PAIN RADIATING DOWN LEG: Primary | ICD-10-CM

## 2017-10-10 DIAGNOSIS — R26.2 DIFFICULTY WALKING: ICD-10-CM

## 2017-10-10 DIAGNOSIS — Z78.9 DIFFICULTY WITH ACTIVITIES OF DAILY LIVING: ICD-10-CM

## 2017-10-10 DIAGNOSIS — M79.606 LOW BACK PAIN RADIATING DOWN LEG: Primary | ICD-10-CM

## 2017-10-10 PROCEDURE — 97110 THERAPEUTIC EXERCISES: CPT | Performed by: PHYSICAL THERAPIST

## 2017-10-10 NOTE — THERAPY TREATMENT NOTE
Outpatient Physical Therapy Ortho Treatment Note  [facility]     Patient Name: Emerita Bazan  : 1935  MRN: [id number]  Today's Date: 10/10/2017      Visit Date: 10/10/2017    Visit Dx:    ICD-10-CM ICD-9-CM   1. Low back pain radiating down leg M54.5 724.2   2. Difficulty with activities of daily living R53.81 799.3   3. Difficulty walking R26.2 719.7       Patient Active Problem List   Diagnosis   • Atopic rhinitis   • Bunion   • Gastroesophageal reflux disease   • Hammer toe   • Hyperlipidemia   • Hypothyroidism   • Neuralgia   • Melanocytic nevus   • Seborrheic keratosis        Past Medical History:   Diagnosis Date   • Arthritis    • Hypothyroid    • Macrocytosis    • Osteoporosis         Past Surgical History:   Procedure Laterality Date   • BUNIONECTOMY     • EYE SURGERY      cataracts               PT Assessment/Plan       10/10/17 1640       PT Assessment    Assessment Comments Emerita tolerated treatment well and is progressing therapeutic exercises to improve spine mobility and comfort.  -MP     PT Plan    PT Plan Comments Continue progressing therapeutic exercises as she tolerates.  -MP       User Key  (r) = Recorded By, (t) = Taken By, (c) = Cosigned By    Initials Name Provider Type    MP Jordan Schaeffer, PT Physical Therapist                Exercises       10/10/17 1600          Subjective Comments    Subjective Comments Emerita reports that she has some pain in the thoracic spine/ribs when breathing but overall has less pain and better tolerance to ADLs.  She has no vertigo.  -MP      Subjective Pain    Able to rate subjective pain? yes  -MP      Pre-Treatment Pain Level 0  -MP      Post-Treatment Pain Level 0  -MP      Aquatics    Aquatics performed? No  -MP      Exercise 1    Exercise Name 1 Refer to land flow sheet  -MP      Exercise 2    Exercise Name 2 Progressed land exercises with standing lat pulls, 10 lbs. 10 x 3, scapular row 7.5 lbs. 10 x 3 and leg press 20 lbs. 10 x 3.  She also did  standing shoulder wall slides x 10.  -MP        User Key  (r) = Recorded By, (t) = Taken By, (c) = Cosigned By    Initials Name Provider Type    RUFINO Schaeffer PT Physical Therapist                PT OP Goals       10/10/17 1600       PT Short Term Goals    STG Date to Achieve 09/13/17  -MP     STG 1 Retesting for BPPV is performed on second visit.  -MP     STG 1 Progress Met  -MP     STG 2 If symptoms present on second visit will assign home activites for self treatment.  -MP     STG 2 Progress Met  -MP     STG 3 Aqua therapy exercises are begun and L spine related symptoms are not worsened.after her first session.  -MP     STG 3 Progress Met  -MP     STG 4 Standing L spine flexion is reduced moderatedly.  -MP     STG 4 Progress Met  -MP     Long Term Goals    LTG Date to Achieve 10/27/17  -MP     LTG 1 No symptoms of vertigo are present.  -MP     LTG 1 Progress Met  -MP     LTG 2 Emerita is independent with all self care education.  -MP     LTG 2 Progress Partially Met  -MP     LTG 3 Dizziness Handicap disability is below 10%.  -MP     LTG 3 Progress Ongoing  -MP     LTG 4 Pain no longer present in the L LE or buttock.  -MP     LTG 4 Progress Partially Met  -MP     LTG 5 MMT of L dorsiflexion is 4+/5 and equal to the R  -MP     LTG 5 Progress Met  -MP     LTG 6 PSFS disabiilty is reduced to 25%.  -MP     LTG 6 Progress Ongoing  -MP       User Key  (r) = Recorded By, (t) = Taken By, (c) = Cosigned By    Initials Name Provider Type    RUFINO Schaeffer PT Physical Therapist                Therapy Education       10/10/17 1639          Therapy Education    Education Details HEP expanded with shoulder wall slides.  -MP      Given HEP;Symptoms/condition management  -MP      Program New  -MP      How Provided Written  -MP      Provided to Patient  -MP      Level of Understanding Teach back education performed  -MP        User Key  (r) = Recorded By, (t) = Taken By, (c) = Cosigned By    Initials Name Provider Type    RUFINO  Jordan Schaeffer, PT Physical Therapist        Time Calculation:   Start Time: 1415  Stop Time: 1500  Time Calculation (min): 45 min    Therapy Charges for Today     Code Description Service Date Service Provider Modifiers Qty    14768642186  PT THER PROC EA 15 MIN 10/10/2017 Jordan Schaeffer PT GP 3          Jordan Schaeffer PT  10/10/2017

## 2017-10-12 ENCOUNTER — HOSPITAL ENCOUNTER (OUTPATIENT)
Dept: PHYSICAL THERAPY | Facility: HOSPITAL | Age: 82
Setting detail: THERAPIES SERIES
Discharge: HOME | End: 2017-10-12

## 2017-10-12 DIAGNOSIS — R42 VERTIGO: ICD-10-CM

## 2017-10-12 DIAGNOSIS — Z78.9 DIFFICULTY WITH ACTIVITIES OF DAILY LIVING: ICD-10-CM

## 2017-10-12 DIAGNOSIS — R26.2 DIFFICULTY WALKING: ICD-10-CM

## 2017-10-12 DIAGNOSIS — M54.50 LOW BACK PAIN RADIATING DOWN LEG: Primary | ICD-10-CM

## 2017-10-12 DIAGNOSIS — M79.606 LOW BACK PAIN RADIATING DOWN LEG: Primary | ICD-10-CM

## 2017-10-12 PROCEDURE — 97110 THERAPEUTIC EXERCISES: CPT | Performed by: PHYSICAL THERAPIST

## 2017-10-12 NOTE — THERAPY TREATMENT NOTE
Outpatient Physical Therapy Ortho Treatment Note  [facility]     Patient Name: Emerita Bazan  : 1935  MRN: [id number]  Today's Date: 10/12/2017      Visit Date: 10/12/2017    Visit Dx:    ICD-10-CM ICD-9-CM   1. Low back pain radiating down leg M54.5 724.2   2. Difficulty with activities of daily living R53.81 799.3   3. Difficulty walking R26.2 719.7   4. Vertigo R42 780.4       Patient Active Problem List   Diagnosis   • Atopic rhinitis   • Bunion   • Gastroesophageal reflux disease   • Hammer toe   • Hyperlipidemia   • Hypothyroidism   • Neuralgia   • Melanocytic nevus   • Seborrheic keratosis        Past Medical History:   Diagnosis Date   • Arthritis    • Hypothyroid    • Macrocytosis    • Osteoporosis         Past Surgical History:   Procedure Laterality Date   • BUNIONECTOMY     • EYE SURGERY      cataracts             PT Assessment/Plan       10/12/17 1527       PT Assessment    Assessment Comments Antoni tolerated treatment well.  She needs verbal cues for positioning of the craniovertebral area for standing UE exercises to maintain good neck positioning.    -MP     PT Plan    PT Plan Comments Monitor the effects of today's treatment and continue progressing therapeutic exercises as tolerated.  -MP       User Key  (r) = Recorded By, (t) = Taken By, (c) = Cosigned By    Initials Name Provider Type    RUFINO Schaeffer, PT Physical Therapist                Exercises       10/12/17 1500          Subjective Comments    Subjective Comments Emertia stated that she was unable to do the wall slides for her shoulder at home due the texture of the wall would not allow her hands to slide up.  -MP      Subjective Pain    Able to rate subjective pain? yes  -MP      Pre-Treatment Pain Level 0  -MP      Post-Treatment Pain Level 0  -MP      Aquatics    Aquatics performed? No  -MP      Exercise 1    Exercise Name 1 Refer to land flow sheet  -MP      Exercise 2    Exercise Name 2 Progressed therapeutic exercises  with posterior pelvic tilt x 10.  -MP        User Key  (r) = Recorded By, (t) = Taken By, (c) = Cosigned By    Initials Name Provider Type    RUFINO Schaeffer PT Physical Therapist                PT OP Goals       10/12/17 1500       PT Short Term Goals    STG Date to Achieve 09/13/17  -MP     STG 1 Retesting for BPPV is performed on second visit.  -MP     STG 1 Progress Met  -MP     STG 2 If symptoms present on second visit will assign home activites for self treatment.  -MP     STG 2 Progress Met  -MP     STG 3 Aqua therapy exercises are begun and L spine related symptoms are not worsened.after her first session.  -MP     STG 3 Progress Met  -MP     STG 4 Standing L spine flexion is reduced moderatedly.  -MP     STG 4 Progress Met  -MP     Long Term Goals    LTG Date to Achieve 10/27/17  -MP     LTG 1 No symptoms of vertigo are present.  -MP     LTG 1 Progress Met  -MP     LTG 2 Emerita is independent with all self care education.  -MP     LTG 2 Progress Partially Met  -MP     LTG 3 Dizziness Handicap disability is below 10%.  -MP     LTG 3 Progress Ongoing  -MP     LTG 4 Pain no longer present in the L LE or buttock.  -MP     LTG 4 Progress Partially Met  -MP     LTG 5 MMT of L dorsiflexion is 4+/5 and equal to the R  -MP     LTG 5 Progress Met  -MP     LTG 6 PSFS disabiilty is reduced to 25%.  -MP     LTG 6 Progress Ongoing  -MP       User Key  (r) = Recorded By, (t) = Taken By, (c) = Cosigned By    Initials Name Provider Type    RUFINO Schaeffer PT Physical Therapist                Therapy Education       10/12/17 1527          Therapy Education    Education Details Posterior pelvic tilt in hooklying was added to her HEP  -MP      Given HEP;Symptoms/condition management  -MP      Program New  -MP      How Provided Written  -MP      Provided to Patient  -MP      Level of Understanding Teach back education performed  -MP        User Key  (r) = Recorded By, (t) = Taken By, (c) = Cosigned By    Initials Name Provider  Type    MP Jordan Schaeffer, PT Physical Therapist        Time Calculation:   Start Time: 1105  Stop Time: 1150  Time Calculation (min): 45 min    Therapy Charges for Today     Code Description Service Date Service Provider Modifiers Qty    81115877044 HC PT THER PROC EA 15 MIN 10/12/2017 Jordan Schaeffer PT GP 3          Jordan Schaeffer PT  10/12/2017

## 2017-10-17 ENCOUNTER — HOSPITAL ENCOUNTER (OUTPATIENT)
Dept: PHYSICAL THERAPY | Facility: HOSPITAL | Age: 82
Setting detail: THERAPIES SERIES
Discharge: HOME | End: 2017-10-17

## 2017-10-17 DIAGNOSIS — M54.50 LOW BACK PAIN AT MULTIPLE SITES: ICD-10-CM

## 2017-10-17 DIAGNOSIS — Z78.9 DIFFICULTY WITH ACTIVITIES OF DAILY LIVING: ICD-10-CM

## 2017-10-17 DIAGNOSIS — R42 VERTIGO: Primary | ICD-10-CM

## 2017-10-17 PROCEDURE — 97110 THERAPEUTIC EXERCISES: CPT | Performed by: PHYSICAL THERAPIST

## 2017-10-17 NOTE — THERAPY TREATMENT NOTE
Outpatient Physical Therapy Ortho Progress Note  [facility]     Patient Name: Emerita Bazan  : 1935  MRN: [id number]  Today's Date: 10/17/2017      Visit Date: 10/17/2017    Visit Dx:    ICD-10-CM ICD-9-CM   1. Vertigo R42 780.4   2. Low back pain at multiple sites M54.5 724.2   3. Difficulty with activities of daily living R53.81 799.3       Patient Active Problem List   Diagnosis   • Atopic rhinitis   • Bunion   • Gastroesophageal reflux disease   • Hammer toe   • Hyperlipidemia   • Hypothyroidism   • Neuralgia   • Melanocytic nevus   • Seborrheic keratosis        Past Medical History:   Diagnosis Date   • Arthritis    • Hypothyroid    • Macrocytosis    • Osteoporosis         Past Surgical History:   Procedure Laterality Date   • BUNIONECTOMY     • EYE SURGERY      cataracts             PT Ortho       10/17/17 1600    Posture/Observations    Posture/Observations Comments Lateral view of spine, forward head, increased thoracici kyphosis and decreased lumbar lordosis.  -MP    Gait Assessment/Treatment    Gait, Comment Ambulates independently on level surface and reciprocally going up/down stairs, using no assistive device or brace.  -MP      User Key  (r) = Recorded By, (t) = Taken By, (c) = Cosigned By    Initials Name Provider Type    RUFINO Schaeffer, PT Physical Therapist                PT Assessment/Plan       10/17/17 1641       PT Assessment    Functional Limitations Impaired gait;Impaired locomotion  -MP     Impairments Posture;Range of motion;Endurance;Poor body mechanics;Locomotion  -MP     Assessment Comments Antoni continues to improve.  She is experiencing increased ADL function and less pain from her pelvic injury.    -MP     Rehab Potential Good  -MP     Patient/caregiver participated in establishment of treatment plan and goals Yes  -MP     Patient would benefit from skilled therapy intervention Yes  -MP     PT Plan    PT Frequency 2x/week  -MP     Predicted Duration of Therapy  Intervention (days/wks) One more visit.  -MP     PT Plan Comments Continue for one more visit and then give final instructions for independent self care.  -MP       User Key  (r) = Recorded By, (t) = Taken By, (c) = Cosigned By    Initials Name Provider Type    RUFINO Schaeffer PT Physical Therapist                Exercises       10/17/17 1600          Subjective Pain    Able to rate subjective pain? yes  -MP      Pre-Treatment Pain Level 0  -MP      Post-Treatment Pain Level 0  -MP      Aquatics    Aquatics performed? No  -MP      Exercise 1    Exercise Name 1 Refer to land flow sheet  -MP      Exercise 2    Exercise Name 2 Progressed therapeutic exercises with lying trunk rotation, x 10, B.  -MP        User Key  (r) = Recorded By, (t) = Taken By, (c) = Cosigned By    Initials Name Provider Type    RUFINO Schaeffer PT Physical Therapist                PT OP Goals       10/17/17 1600       PT Short Term Goals    STG Date to Achieve 09/13/17  -MP     STG 1 Retesting for BPPV is performed on second visit.  -MP     STG 1 Progress Met  -MP     STG 2 If symptoms present on second visit will assign home activites for self treatment.  -MP     STG 2 Progress Met  -MP     STG 3 Aqua therapy exercises are begun and L spine related symptoms are not worsened.after her first session.  -MP     STG 3 Progress Met  -MP     STG 4 Standing L spine flexion is reduced moderatedly.  -MP     STG 4 Progress Met  -MP     Long Term Goals    LTG Date to Achieve 10/27/17  -MP     LTG 1 No symptoms of vertigo are present.  -MP     LTG 1 Progress Met  -MP     LTG 2 Emerita is independent with all self care education.  -MP     LTG 2 Progress Partially Met  -MP     LTG 3 Dizziness Handicap disability is below 10%.  -MP     LTG 3 Progress Ongoing  -MP     LTG 4 Pain no longer present in the L LE or buttock.  -MP     LTG 4 Progress Partially Met  -MP     LTG 5 MMT of L dorsiflexion is 4+/5 and equal to the R  -MP     LTG 5 Progress Met  -MP     LTG 6  PSFS disabiilty is reduced to 25%.  -MP     LTG 6 Progress Ongoing  -MP       User Key  (r) = Recorded By, (t) = Taken By, (c) = Cosigned By    Initials Name Provider Type    RUFINO Schaeffer PT Physical Therapist                Therapy Education       10/17/17 1640          Therapy Education    Education Details Trunk rotation, hooklying, was added to her HEP.  -MP      Given HEP  -MP      Program New  -MP      How Provided Written  -MP      Provided to Patient  -MP      Level of Understanding Teach back education performed  -MP        User Key  (r) = Recorded By, (t) = Taken By, (c) = Cosigned By    Initials Name Provider Type    RUFINO Schaeffer PT Physical Therapist        Time Calculation:   Start Time: 1420  Stop Time: 1500  Time Calculation (min): 40 min    Therapy Charges for Today     Code Description Service Date Service Provider Modifiers Qty    80117492459 HC PT THER PROC EA 15 MIN 10/17/2017 Jordan Schaeffer PT GP 3        Jordan Schaeffer PT  10/17/2017

## 2017-10-19 ENCOUNTER — HOSPITAL ENCOUNTER (OUTPATIENT)
Dept: PHYSICAL THERAPY | Facility: HOSPITAL | Age: 82
Setting detail: THERAPIES SERIES
Discharge: HOME | End: 2017-10-19

## 2017-10-19 ENCOUNTER — LAB (OUTPATIENT)
Dept: LAB | Facility: HOSPITAL | Age: 82
End: 2017-10-19

## 2017-10-19 ENCOUNTER — CONSULT (OUTPATIENT)
Dept: ONCOLOGY | Facility: CLINIC | Age: 82
End: 2017-10-19

## 2017-10-19 VITALS
TEMPERATURE: 97.6 F | RESPIRATION RATE: 16 BRPM | DIASTOLIC BLOOD PRESSURE: 72 MMHG | HEART RATE: 76 BPM | SYSTOLIC BLOOD PRESSURE: 120 MMHG

## 2017-10-19 DIAGNOSIS — M80.88XG OSTEOPOROTIC COMPRESSION FRACTURE OF SPINE WITH DELAYED HEALING: ICD-10-CM

## 2017-10-19 DIAGNOSIS — D75.89 MACROCYTOSIS: Primary | ICD-10-CM

## 2017-10-19 DIAGNOSIS — R42 VERTIGO: Primary | ICD-10-CM

## 2017-10-19 DIAGNOSIS — Z78.9 DIFFICULTY WITH ACTIVITIES OF DAILY LIVING: ICD-10-CM

## 2017-10-19 DIAGNOSIS — R79.9 ABNORMAL FINDING OF BLOOD CHEMISTRY: ICD-10-CM

## 2017-10-19 DIAGNOSIS — M54.50 LOW BACK PAIN AT MULTIPLE SITES: ICD-10-CM

## 2017-10-19 LAB
BASOPHILS # BLD AUTO: 0.09 10*3/MM3 (ref 0–0.1)
BASOPHILS NFR BLD AUTO: 1.1 % (ref 0–1.1)
DEPRECATED RDW RBC AUTO: 46.3 FL (ref 37–49)
EOSINOPHIL # BLD AUTO: 0.25 10*3/MM3 (ref 0–0.36)
EOSINOPHIL NFR BLD AUTO: 3.1 % (ref 1–5)
ERYTHROCYTE [DISTWIDTH] IN BLOOD BY AUTOMATED COUNT: 12.9 % (ref 11.7–14.5)
FERRITIN SERPL-MCNC: 302.7 NG/ML
FOLATE SERPL-MCNC: >20 NG/ML (ref 4.78–24.2)
HCT VFR BLD AUTO: 37.9 % (ref 34–45)
HGB BLD-MCNC: 12.7 G/DL (ref 11.5–14.9)
HGB RETIC QN: 36.7 PG (ref 29.8–36.1)
IMM GRANULOCYTES # BLD: 0.02 10*3/MM3 (ref 0–0.03)
IMM GRANULOCYTES NFR BLD: 0.3 % (ref 0–0.5)
IMM RETICS NFR: 7.5 % (ref 3–15.8)
IRON 24H UR-MRATE: 34 MCG/DL (ref 37–145)
IRON SATN MFR SERPL: 12 % (ref 14–48)
LYMPHOCYTES # BLD AUTO: 2.35 10*3/MM3 (ref 1–3.5)
LYMPHOCYTES NFR BLD AUTO: 29.4 % (ref 20–49)
MCH RBC QN AUTO: 32.6 PG (ref 27–33)
MCHC RBC AUTO-ENTMCNC: 33.5 G/DL (ref 32–35)
MCV RBC AUTO: 97.2 FL (ref 83–97)
MONOCYTES # BLD AUTO: 0.73 10*3/MM3 (ref 0.25–0.8)
MONOCYTES NFR BLD AUTO: 9.1 % (ref 4–12)
NEUTROPHILS # BLD AUTO: 4.54 10*3/MM3 (ref 1.5–7)
NEUTROPHILS NFR BLD AUTO: 57 % (ref 39–75)
NRBC BLD MANUAL-RTO: 0 /100 WBC (ref 0–0)
PLATELET # BLD AUTO: 283 10*3/MM3 (ref 150–375)
PMV BLD AUTO: 9.6 FL (ref 8.9–12.1)
RBC # BLD AUTO: 3.9 10*6/MM3 (ref 3.9–5)
RETICS/RBC NFR AUTO: 1.51 % (ref 0.6–2)
TIBC SERPL-MCNC: 286 MCG/DL (ref 249–505)
TRANSFERRIN SERPL-MCNC: 204 MG/DL (ref 200–360)
VIT B12 BLD-MCNC: >2000 PG/ML (ref 211–946)
WBC NRBC COR # BLD: 7.98 10*3/MM3 (ref 4–10)

## 2017-10-19 PROCEDURE — G8982 BODY POS GOAL STATUS: HCPCS | Performed by: PHYSICAL THERAPIST

## 2017-10-19 PROCEDURE — 82746 ASSAY OF FOLIC ACID SERUM: CPT | Performed by: INTERNAL MEDICINE

## 2017-10-19 PROCEDURE — 82607 VITAMIN B-12: CPT | Performed by: INTERNAL MEDICINE

## 2017-10-19 PROCEDURE — 84466 ASSAY OF TRANSFERRIN: CPT | Performed by: INTERNAL MEDICINE

## 2017-10-19 PROCEDURE — G8979 MOBILITY GOAL STATUS: HCPCS | Performed by: PHYSICAL THERAPIST

## 2017-10-19 PROCEDURE — 97110 THERAPEUTIC EXERCISES: CPT | Performed by: PHYSICAL THERAPIST

## 2017-10-19 PROCEDURE — G8981 BODY POS CURRENT STATUS: HCPCS | Performed by: PHYSICAL THERAPIST

## 2017-10-19 PROCEDURE — G8980 MOBILITY D/C STATUS: HCPCS | Performed by: PHYSICAL THERAPIST

## 2017-10-19 PROCEDURE — 83540 ASSAY OF IRON: CPT | Performed by: INTERNAL MEDICINE

## 2017-10-19 PROCEDURE — 85046 RETICYTE/HGB CONCENTRATE: CPT | Performed by: INTERNAL MEDICINE

## 2017-10-19 PROCEDURE — 36415 COLL VENOUS BLD VENIPUNCTURE: CPT | Performed by: INTERNAL MEDICINE

## 2017-10-19 PROCEDURE — 36416 COLLJ CAPILLARY BLOOD SPEC: CPT | Performed by: INTERNAL MEDICINE

## 2017-10-19 PROCEDURE — 82728 ASSAY OF FERRITIN: CPT | Performed by: INTERNAL MEDICINE

## 2017-10-19 PROCEDURE — 99204 OFFICE O/P NEW MOD 45 MIN: CPT | Performed by: INTERNAL MEDICINE

## 2017-10-19 PROCEDURE — 85025 COMPLETE CBC W/AUTO DIFF WBC: CPT | Performed by: INTERNAL MEDICINE

## 2017-10-19 RX ORDER — ZOSTER VACCINE LIVE 19400 [PFU]/.65ML
INJECTION, POWDER, LYOPHILIZED, FOR SUSPENSION SUBCUTANEOUS
Refills: 0 | COMMUNITY
Start: 2017-09-30 | End: 2019-07-03

## 2017-10-19 RX ORDER — ASPIRIN 81 MG/1
81 TABLET ORAL DAILY
COMMUNITY
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
## 2017-10-19 NOTE — THERAPY DISCHARGE NOTE
Outpatient Physical Therapy Ortho/Vestibular Treatment Note/Discharge Summary  [facility]     Patient Name: Emerita Bazan  : 1935  MRN: [id number]  Today's Date: 10/19/2017      Visit Date: 10/19/2017    Visit Dx:    ICD-10-CM ICD-9-CM   1. Vertigo R42 780.4   2. Low back pain at multiple sites M54.5 724.2   3. Difficulty with activities of daily living R53.81 799.3       Patient Active Problem List   Diagnosis   • Atopic rhinitis   • Bunion   • Gastroesophageal reflux disease   • Hammer toe   • Hyperlipidemia   • Hypothyroidism   • Neuralgia   • Melanocytic nevus   • Seborrheic keratosis        Past Medical History:   Diagnosis Date   • Arthritis    • Hypothyroid    • Macrocytosis    • Osteoporosis         Past Surgical History:   Procedure Laterality Date   • BUNIONECTOMY     • EYE SURGERY      cataracts             PT Ortho       10/19/17 1200    Subjective Pain    Able to rate subjective pain? yes  -MP    Pre-Treatment Pain Level 0  -MP    Post-Treatment Pain Level 0  -MP    ROM (Range of Motion)    General ROM Detail L-Spine, AROM, standing, flexion WNL, extension moderate decrease, LB B moderate decrease, but all movements were pain free.  -MP    MMT (Manual Muscle Testing)    General MMT Assessment Detail B LE myotomes were L2-S2 were equal and grossly rated at 4+/5, except B hip flexion 4/5.    -MP      10/17/17 1600    Posture/Observations    Posture/Observations Comments Lateral view of spine, forward head, increased thoracici kyphosis and decreased lumbar lordosis.  -MP    Gait Assessment/Treatment    Gait, Comment Ambulates independently on level surface and reciprocally going up/down stairs, using no assistive device or brace.  -MP      User Key  (r) = Recorded By, (t) = Taken By, (c) = Cosigned By    Initials Name Provider Type    MP Jordan Schaeffer PT Physical Therapist                      Exercises       10/19/17 1200          Subjective Comments    Subjective Comments Emerita stated that  she continues to be vertigo free and that she is able to do all ADLs pain free.  -MP      Subjective Pain    Able to rate subjective pain? yes  -MP      Pre-Treatment Pain Level 0  -MP      Post-Treatment Pain Level 0  -MP      Aquatics    Aquatics performed? No  -MP      Exercise 1    Exercise Name 1 Refer to land flow sheet  -MP        User Key  (r) = Recorded By, (t) = Taken By, (c) = Cosigned By    Initials Name Provider Type    RUFINO Schaeffer PT Physical Therapist                  PT OP Goals       10/19/17 1300       PT Short Term Goals    STG Date to Achieve 09/13/17  -MP     STG 1 Retesting for BPPV is performed on second visit.  -MP     STG 1 Progress Met  -MP     STG 2 If symptoms present on second visit will assign home activites for self treatment.  -MP     STG 2 Progress Met  -MP     STG 3 Aqua therapy exercises are begun and L spine related symptoms are not worsened.after her first session.  -MP     STG 3 Progress Met  -MP     STG 4 Standing L spine flexion is reduced moderatedly.  -MP     STG 4 Progress Met  -MP     Long Term Goals    LTG Date to Achieve 10/27/17  -MP     LTG 1 No symptoms of vertigo are present.  -MP     LTG 1 Progress Met  -MP     LTG 2 Emerita is independent with all self care education.  -MP     LTG 2 Progress Met  -MP     LTG 3 Dizziness Handicap disability is below 10%.  -MP     LTG 3 Progress Met  -MP     LTG 4 Pain no longer present in the L LE or buttock.  -MP     LTG 4 Progress Met  -MP     LTG 5 MMT of L dorsiflexion is 4+/5 and equal to the R  -MP     LTG 5 Progress Met  -MP     LTG 6 PSFS disabiilty is reduced to 25%.  -MP     LTG 6 Progress Met  -MP       User Key  (r) = Recorded By, (t) = Taken By, (c) = Cosigned By    Initials Name Provider Type    RUFINO Schaeffer PT Physical Therapist                Therapy Education       10/19/17 1302          Therapy Education    Given HEP;Symptoms/condition management  -MP      Program Reinforced  -MP      How Provided Verbal   -MP      Provided to Patient  -MP      Level of Understanding Teach back education performed  -MP        User Key  (r) = Recorded By, (t) = Taken By, (c) = Cosigned By    Initials Name Provider Type    RUFINO Schaeffer PT Physical Therapist                Outcome Measures       10/19/17 1300          Patient Specific Functional Scale    Activity 1 Score 10  -MP      Activity 2 Score 7  -MP      Activity 3 Score 10  -MP      Total Score 0  -MP      Patient Specific Functional Scale Commetns 27/30, 10% disability  -MP      Functional Assessment    Outcome Measure Options Patient Specific Functional Scale  -MP        User Key  (r) = Recorded By, (t) = Taken By, (c) = Cosigned By    Initials Name Provider Type    RUFINO Schaeffer PT Physical Therapist            Time Calculation:   Start Time: 1105  Stop Time: 1155  Time Calculation (min): 50 min    Therapy Charges for Today     Code Description Service Date Service Provider Modifiers Qty    59156032054 HC PT MOBILITY PROJECTED 10/19/2017 Jordan Schaeffer, PT GP, CJ 1    08477941374 HC PT MOBILITY DISCHARGE 10/19/2017 Jordan Schaeffer PT GP, CI 1    62415606930 HC PT THER PROC EA 15 MIN 10/19/2017 Jordan Schaeffer PT GP 3          PT G-Codes  Outcome Measure Options: Patient Specific Functional Scale  Score: 27/30, 10% disability  Functional Limitation: Mobility: Walking and moving around  Mobility: Walking and Moving Around Goal Status (): At least 20 percent but less than 40 percent impaired, limited or restricted  Mobility: Walking and Moving Around Discharge Status (): At least 1 percent but less than 20 percent impaired, limited or restricted  Changing and Maintaining Body Position Current Status (): 0 percent impaired, limited or restricted  Changing and Maintaining Body Position Goal Status (): At least 1 percent but less than 20 percent impaired, limited or restricted     OP PT Discharge Summary  Date of Discharge: 10/19/17  Reason for Discharge: All  goals achieved  Outcomes Achieved: Patient able to partially acheive established goals  Discharge Destination: Home with home program  Discharge Instructions: Ms. Bazan is much improved, met all goals and is ready for discharge.      Jordan Schaeffer, PT  10/19/2017

## 2017-10-20 LAB
ALBUMIN SERPL-MCNC: 3.5 G/DL (ref 2.9–4.4)
ALBUMIN/GLOB SERPL: 1.1 {RATIO} (ref 0.7–1.7)
ALPHA1 GLOB FLD ELPH-MCNC: 0.4 G/DL (ref 0–0.4)
ALPHA2 GLOB SERPL ELPH-MCNC: 0.8 G/DL (ref 0.4–1)
B-GLOBULIN SERPL ELPH-MCNC: 1 G/DL (ref 0.7–1.3)
GAMMA GLOB SERPL ELPH-MCNC: 1.2 G/DL (ref 0.4–1.8)
GLOBULIN SER CALC-MCNC: 3.4 G/DL (ref 2.2–3.9)
IGA SERPL-MCNC: 181 MG/DL (ref 64–422)
IGG SERPL-MCNC: 1002 MG/DL (ref 700–1600)
IGM SERPL-MCNC: 174 MG/DL (ref 26–217)
INTERPRETATION SERPL IEP-IMP: ABNORMAL
KAPPA LC SERPL-MCNC: 20.7 MG/L (ref 3.3–19.4)
KAPPA LC/LAMBDA SER: 0.83 {RATIO} (ref 0.26–1.65)
LAMBDA LC FREE SERPL-MCNC: 25 MG/L (ref 5.7–26.3)
Lab: ABNORMAL
M-SPIKE: ABNORMAL G/DL
PROT SERPL-MCNC: 6.9 G/DL (ref 6–8.5)

## 2017-11-03 ENCOUNTER — LAB (OUTPATIENT)
Dept: LAB | Facility: HOSPITAL | Age: 82
End: 2017-11-03

## 2017-11-03 ENCOUNTER — OFFICE VISIT (OUTPATIENT)
Dept: ONCOLOGY | Facility: CLINIC | Age: 82
End: 2017-11-03

## 2017-11-03 VITALS
HEIGHT: 58 IN | SYSTOLIC BLOOD PRESSURE: 110 MMHG | DIASTOLIC BLOOD PRESSURE: 70 MMHG | HEART RATE: 78 BPM | TEMPERATURE: 97.7 F | RESPIRATION RATE: 16 BRPM

## 2017-11-03 DIAGNOSIS — D75.89 MACROCYTOSIS: Primary | ICD-10-CM

## 2017-11-03 DIAGNOSIS — M80.88XG OSTEOPOROTIC COMPRESSION FRACTURE OF SPINE WITH DELAYED HEALING: ICD-10-CM

## 2017-11-03 DIAGNOSIS — D75.89 MACROCYTOSIS: ICD-10-CM

## 2017-11-03 LAB
BASOPHILS # BLD AUTO: 0.07 10*3/MM3 (ref 0–0.1)
BASOPHILS NFR BLD AUTO: 0.9 % (ref 0–1.1)
DEPRECATED RDW RBC AUTO: 46 FL (ref 37–49)
EOSINOPHIL # BLD AUTO: 0.3 10*3/MM3 (ref 0–0.36)
EOSINOPHIL NFR BLD AUTO: 4 % (ref 1–5)
ERYTHROCYTE [DISTWIDTH] IN BLOOD BY AUTOMATED COUNT: 12.8 % (ref 11.7–14.5)
HCT VFR BLD AUTO: 35.2 % (ref 34–45)
HGB BLD-MCNC: 12 G/DL (ref 11.5–14.9)
IMM GRANULOCYTES # BLD: 0.02 10*3/MM3 (ref 0–0.03)
IMM GRANULOCYTES NFR BLD: 0.3 % (ref 0–0.5)
LYMPHOCYTES # BLD AUTO: 2.33 10*3/MM3 (ref 1–3.5)
LYMPHOCYTES NFR BLD AUTO: 30.7 % (ref 20–49)
MCH RBC QN AUTO: 33.1 PG (ref 27–33)
MCHC RBC AUTO-ENTMCNC: 34.1 G/DL (ref 32–35)
MCV RBC AUTO: 97 FL (ref 83–97)
MONOCYTES # BLD AUTO: 0.75 10*3/MM3 (ref 0.25–0.8)
MONOCYTES NFR BLD AUTO: 9.9 % (ref 4–12)
NEUTROPHILS # BLD AUTO: 4.12 10*3/MM3 (ref 1.5–7)
NEUTROPHILS NFR BLD AUTO: 54.2 % (ref 39–75)
NRBC BLD MANUAL-RTO: 0 /100 WBC (ref 0–0)
PLATELET # BLD AUTO: 277 10*3/MM3 (ref 150–375)
PMV BLD AUTO: 9.3 FL (ref 8.9–12.1)
RBC # BLD AUTO: 3.63 10*6/MM3 (ref 3.9–5)
WBC NRBC COR # BLD: 7.59 10*3/MM3 (ref 4–10)

## 2017-11-03 PROCEDURE — 36416 COLLJ CAPILLARY BLOOD SPEC: CPT | Performed by: INTERNAL MEDICINE

## 2017-11-03 PROCEDURE — 99213 OFFICE O/P EST LOW 20 MIN: CPT | Performed by: INTERNAL MEDICINE

## 2017-11-03 PROCEDURE — 85025 COMPLETE CBC W/AUTO DIFF WBC: CPT | Performed by: INTERNAL MEDICINE
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
## 2017-12-19 ENCOUNTER — OFFICE VISIT (OUTPATIENT)
Dept: OBSTETRICS AND GYNECOLOGY | Age: 82
End: 2017-12-19

## 2017-12-19 VITALS
WEIGHT: 124 LBS | HEIGHT: 58 IN | BODY MASS INDEX: 26.03 KG/M2 | SYSTOLIC BLOOD PRESSURE: 110 MMHG | DIASTOLIC BLOOD PRESSURE: 70 MMHG

## 2017-12-19 DIAGNOSIS — Z79.890 HORMONE REPLACEMENT THERAPY: ICD-10-CM

## 2017-12-19 DIAGNOSIS — M81.0 AGE RELATED OSTEOPOROSIS, UNSPECIFIED PATHOLOGICAL FRACTURE PRESENCE: ICD-10-CM

## 2017-12-19 DIAGNOSIS — M80.88XG OSTEOPOROTIC COMPRESSION FRACTURE OF SPINE WITH DELAYED HEALING: Primary | ICD-10-CM

## 2017-12-19 PROCEDURE — 99202 OFFICE O/P NEW SF 15 MIN: CPT | Performed by: OBSTETRICS & GYNECOLOGY

## 2017-12-19 RX ORDER — MEDROXYPROGESTERONE ACETATE 2.5 MG/1
2.5 TABLET ORAL DAILY
Qty: 90 TABLET | Refills: 3 | Status: SHIPPED | OUTPATIENT
Start: 2017-12-19 | End: 2018-12-24 | Stop reason: SDUPTHER

## 2017-12-19 RX ORDER — ESTRADIOL 0.5 MG/1
0.5 TABLET ORAL DAILY
Qty: 90 TABLET | Refills: 3 | Status: SHIPPED | OUTPATIENT
Start: 2017-12-19 | End: 2018-11-10 | Stop reason: SDUPTHER
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
## 2017-12-29 ENCOUNTER — TELEPHONE (OUTPATIENT)
Dept: OBSTETRICS AND GYNECOLOGY | Age: 82
End: 2017-12-29

## 2018-01-02 ENCOUNTER — TELEPHONE (OUTPATIENT)
Dept: MAMMOGRAPHY | Age: 83
End: 2018-01-02

## 2018-01-02 ENCOUNTER — HOSPITAL ENCOUNTER (OUTPATIENT)
Dept: MAMMOGRAPHY | Facility: HOSPITAL | Age: 83
Discharge: HOME | End: 2018-01-02
Attending: OBSTETRICS & GYNECOLOGY | Admitting: OBSTETRICS & GYNECOLOGY

## 2018-01-02 ENCOUNTER — HOSPITAL ENCOUNTER (OUTPATIENT)
Dept: ULTRASOUND IMAGING | Facility: HOSPITAL | Age: 83
Discharge: HOME | End: 2018-01-02

## 2018-01-02 DIAGNOSIS — N64.89 BREAST ASYMMETRY: ICD-10-CM

## 2018-01-02 DIAGNOSIS — Z87.39 HX OF OSTEOPENIA: ICD-10-CM

## 2018-01-02 DIAGNOSIS — Z00.00 ENCOUNTER FOR PREVENTIVE HEALTH EXAMINATION: ICD-10-CM

## 2018-01-02 PROCEDURE — 76642 ULTRASOUND BREAST LIMITED: CPT

## 2018-01-02 PROCEDURE — 77065 DX MAMMO INCL CAD UNI: CPT

## 2018-01-02 NOTE — TELEPHONE ENCOUNTER
Informed pt her follow up mammogram was abnormal .  Recommends LT mammogram with spots in 6 months.   Due in June 2018.  [facility] schedule one will call her to set up appt.  Order in [employer].

## 2018-01-10 ENCOUNTER — TELEPHONE (OUTPATIENT)
Dept: OBSTETRICS AND GYNECOLOGY | Age: 83
End: 2018-01-10

## 2018-01-10 NOTE — TELEPHONE ENCOUNTER
----- Message from Mehran Bergeron MD sent at 1/10/2018  3:45 PM EST -----  Please make sure patient is aware that her 6 month follow-up visit in appointment are scheduled if it is not documented

## 2018-01-10 NOTE — PROGRESS NOTES
Please make sure patient is aware that her 6 month follow-up visit in appointment are scheduled if it is not documented

## 2018-01-10 NOTE — TELEPHONE ENCOUNTER
Pt.is aware of her 6 month follow up appt. She is very pleased with the numerous calls that have been made to her in regards to the follow up.States we are doing a great job.

## 2018-01-29 ENCOUNTER — OFFICE VISIT (OUTPATIENT)
Dept: FAMILY MEDICINE CLINIC | Facility: CLINIC | Age: 83
End: 2018-01-29

## 2018-01-29 VITALS
HEART RATE: 86 BPM | OXYGEN SATURATION: 98 % | HEIGHT: 58 IN | SYSTOLIC BLOOD PRESSURE: 100 MMHG | TEMPERATURE: 97.9 F | DIASTOLIC BLOOD PRESSURE: 70 MMHG | WEIGHT: 124 LBS | BODY MASS INDEX: 26.03 KG/M2

## 2018-01-29 DIAGNOSIS — E78.5 HYPERLIPIDEMIA, UNSPECIFIED HYPERLIPIDEMIA TYPE: Primary | ICD-10-CM

## 2018-01-29 DIAGNOSIS — E03.9 ACQUIRED HYPOTHYROIDISM: ICD-10-CM

## 2018-01-29 DIAGNOSIS — B07.0 PLANTAR WART OF RIGHT FOOT: ICD-10-CM

## 2018-01-29 DIAGNOSIS — R06.02 SHORTNESS OF BREATH: ICD-10-CM

## 2018-01-29 DIAGNOSIS — S22.050S: ICD-10-CM

## 2018-01-29 PROBLEM — D75.89 MACROCYTOSIS: Status: RESOLVED | Noted: 2017-10-19 | Resolved: 2018-01-29

## 2018-01-29 PROCEDURE — 99214 OFFICE O/P EST MOD 30 MIN: CPT | Performed by: INTERNAL MEDICINE

## 2018-01-29 RX ORDER — LEVOTHYROXINE SODIUM 0.1 MG/1
100 TABLET ORAL 2 TIMES WEEKLY
COMMUNITY
End: 2018-02-02 | Stop reason: SDUPTHER
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
## 2018-01-30 ENCOUNTER — TELEPHONE (OUTPATIENT)
Dept: FAMILY MEDICINE CLINIC | Facility: CLINIC | Age: 83
End: 2018-01-30

## 2018-01-30 ENCOUNTER — RESULTS ENCOUNTER (OUTPATIENT)
Dept: FAMILY MEDICINE CLINIC | Facility: CLINIC | Age: 83
End: 2018-01-30

## 2018-01-30 DIAGNOSIS — E03.9 ACQUIRED HYPOTHYROIDISM: ICD-10-CM

## 2018-01-30 DIAGNOSIS — E78.5 HYPERLIPIDEMIA, UNSPECIFIED HYPERLIPIDEMIA TYPE: ICD-10-CM

## 2018-01-30 NOTE — TELEPHONE ENCOUNTER
Called [facility] for a written report of pt's most recent thoracic MRI.  WIll fax over.      ----- Message from Humberto Lopez MD sent at 1/29/2018 12:18 PM EST -----  Call [facility] for written report on recent mri thoracic

## 2018-01-31 LAB
ALBUMIN SERPL-MCNC: 4 G/DL (ref 3.5–5.2)
ALBUMIN/GLOB SERPL: 1.5 G/DL
ALP SERPL-CCNC: 57 U/L (ref 39–117)
ALT SERPL-CCNC: 10 U/L (ref 1–33)
AST SERPL-CCNC: 16 U/L (ref 1–32)
BILIRUB SERPL-MCNC: 0.4 MG/DL (ref 0.1–1.2)
BUN SERPL-MCNC: 20 MG/DL (ref 8–23)
BUN/CREAT SERPL: 19.6 (ref 7–25)
CALCIUM SERPL-MCNC: 9 MG/DL (ref 8.6–10.5)
CHLORIDE SERPL-SCNC: 102 MMOL/L (ref 98–107)
CHOLEST SERPL-MCNC: 195 MG/DL (ref 0–200)
CO2 SERPL-SCNC: 30.7 MMOL/L (ref 22–29)
CREAT SERPL-MCNC: 1.02 MG/DL (ref 0.57–1)
GFR SERPLBLD CREATININE-BSD FMLA CKD-EPI: 52 ML/MIN/1.73
GFR SERPLBLD CREATININE-BSD FMLA CKD-EPI: 63 ML/MIN/1.73
GLOBULIN SER CALC-MCNC: 2.6 GM/DL
GLUCOSE SERPL-MCNC: 93 MG/DL (ref 65–99)
HDLC SERPL-MCNC: 63 MG/DL (ref 40–60)
INTERPRETATION: NORMAL
LDLC SERPL CALC-MCNC: 107 MG/DL (ref 0–100)
POTASSIUM SERPL-SCNC: 3.9 MMOL/L (ref 3.5–5.2)
PROT SERPL-MCNC: 6.6 G/DL (ref 6–8.5)
SODIUM SERPL-SCNC: 141 MMOL/L (ref 136–145)
T4 FREE SERPL-MCNC: 1.19 NG/DL (ref 0.93–1.7)
TRIGL SERPL-MCNC: 127 MG/DL (ref 0–150)
TSH SERPL DL<=0.005 MIU/L-ACNC: 7.57 MIU/ML (ref 0.27–4.2)
VLDLC SERPL CALC-MCNC: 25.4 MG/DL (ref 5–40)

## 2018-02-01 ENCOUNTER — HOSPITAL ENCOUNTER (OUTPATIENT)
Dept: RESPIRATORY THERAPY | Facility: HOSPITAL | Age: 83
Discharge: HOME | End: 2018-02-01
Attending: INTERNAL MEDICINE | Admitting: INTERNAL MEDICINE

## 2018-02-01 DIAGNOSIS — R06.02 SHORTNESS OF BREATH: ICD-10-CM

## 2018-02-01 LAB
BDY SITE: NORMAL
HGB BLDA-MCNC: 13.1 G/DL

## 2018-02-01 PROCEDURE — 63710000001 ALBUTEROL PER 1 MG: Performed by: INTERNAL MEDICINE

## 2018-02-01 PROCEDURE — 82820 HEMOGLOBIN-OXYGEN AFFINITY: CPT | Performed by: INTERNAL MEDICINE

## 2018-02-01 PROCEDURE — 94726 PLETHYSMOGRAPHY LUNG VOLUMES: CPT

## 2018-02-01 PROCEDURE — 94060 EVALUATION OF WHEEZING: CPT

## 2018-02-01 PROCEDURE — A9270 NON-COVERED ITEM OR SERVICE: HCPCS | Performed by: INTERNAL MEDICINE

## 2018-02-01 PROCEDURE — 94729 DIFFUSING CAPACITY: CPT

## 2018-02-01 RX ORDER — ALBUTEROL SULFATE 2.5 MG/3ML
2.5 SOLUTION RESPIRATORY (INHALATION) ONCE AS NEEDED
Status: COMPLETED | OUTPATIENT
Start: 2018-02-01 | End: 2018-02-01

## 2018-02-01 RX ADMIN — ALBUTEROL SULFATE 2.5 MG: 2.5 SOLUTION RESPIRATORY (INHALATION) at 12:15

## 2018-02-02 RX ORDER — LEVOTHYROXINE SODIUM 0.1 MG/1
100 TABLET ORAL DAILY
Qty: 90 TABLET | Refills: 1 | Status: SHIPPED | OUTPATIENT
Start: 2018-02-02 | End: 2018-08-11 | Stop reason: SDUPTHER

## 2018-02-04 ENCOUNTER — HOSPITAL ENCOUNTER (OUTPATIENT)
Dept: GENERAL RADIOLOGY | Facility: HOSPITAL | Age: 83
Discharge: HOME | End: 2018-02-04
Attending: INTERNAL MEDICINE | Admitting: INTERNAL MEDICINE

## 2018-02-04 DIAGNOSIS — R06.02 SHORTNESS OF BREATH: ICD-10-CM

## 2018-02-04 PROCEDURE — 71046 X-RAY EXAM CHEST 2 VIEWS: CPT

## 2018-02-05 DIAGNOSIS — R94.2 ABNORMAL DIFFUSION CAPACITY DETERMINED BY PULMONARY FUNCTION TEST: Primary | ICD-10-CM

## 2018-02-23 ENCOUNTER — TRANSCRIBE ORDERS (OUTPATIENT)
Dept: ADMINISTRATIVE | Facility: HOSPITAL | Age: 83
End: 2018-02-23

## 2018-02-23 DIAGNOSIS — R06.02 SHORTNESS OF BREATH: Primary | ICD-10-CM

## 2018-03-02 ENCOUNTER — HOSPITAL ENCOUNTER (OUTPATIENT)
Dept: CARDIOLOGY | Facility: HOSPITAL | Age: 83
Discharge: HOME | End: 2018-03-02
Attending: INTERNAL MEDICINE | Admitting: INTERNAL MEDICINE

## 2018-03-02 VITALS
HEIGHT: 58 IN | SYSTOLIC BLOOD PRESSURE: 110 MMHG | WEIGHT: 124 LBS | HEART RATE: 77 BPM | BODY MASS INDEX: 26.03 KG/M2 | DIASTOLIC BLOOD PRESSURE: 62 MMHG

## 2018-03-02 DIAGNOSIS — R06.02 SHORTNESS OF BREATH: ICD-10-CM

## 2018-03-02 LAB
ASCENDING AORTA: 3 CM
BH CV ECHO MEAS - ACS: 1.8 CM
BH CV ECHO MEAS - AO MAX PG (FULL): 2.1 MMHG
BH CV ECHO MEAS - AO MAX PG: 4.3 MMHG
BH CV ECHO MEAS - AO MEAN PG (FULL): 1.4 MMHG
BH CV ECHO MEAS - AO MEAN PG: 2.5 MMHG
BH CV ECHO MEAS - AO ROOT AREA (BSA CORRECTED): 2.1
BH CV ECHO MEAS - AO ROOT AREA: 7.8 CM^2
BH CV ECHO MEAS - AO ROOT DIAM: 3.1 CM
BH CV ECHO MEAS - AO V2 MAX: 103.2 CM/SEC
BH CV ECHO MEAS - AO V2 MEAN: 76 CM/SEC
BH CV ECHO MEAS - AO V2 VTI: 22.2 CM
BH CV ECHO MEAS - AVA(I,A): 1.3 CM^2
BH CV ECHO MEAS - AVA(I,D): 1.3 CM^2
BH CV ECHO MEAS - AVA(V,A): 1.7 CM^2
BH CV ECHO MEAS - AVA(V,D): 1.7 CM^2
BH CV ECHO MEAS - BSA(HAYCOCK): 1.5 M^2
BH CV ECHO MEAS - BSA: 1.5 M^2
BH CV ECHO MEAS - BZI_BMI: 25 KILOGRAMS/M^2
BH CV ECHO MEAS - BZI_METRIC_HEIGHT: 149.9 CM
BH CV ECHO MEAS - BZI_METRIC_WEIGHT: 56.2 KG
BH CV ECHO MEAS - CONTRAST EF (2CH): 62.9 ML/M^2
BH CV ECHO MEAS - CONTRAST EF 4CH: 64.7 ML/M^2
BH CV ECHO MEAS - EDV(MOD-SP2): 62 ML
BH CV ECHO MEAS - EDV(MOD-SP4): 68 ML
BH CV ECHO MEAS - EDV(TEICH): 79.7 ML
BH CV ECHO MEAS - EF(CUBED): 63 %
BH CV ECHO MEAS - EF(MOD-SP2): 62.9 %
BH CV ECHO MEAS - EF(MOD-SP4): 64.7 %
BH CV ECHO MEAS - EF(TEICH): 54.8 %
BH CV ECHO MEAS - ESV(MOD-SP2): 23 ML
BH CV ECHO MEAS - ESV(MOD-SP4): 24 ML
BH CV ECHO MEAS - ESV(TEICH): 36 ML
BH CV ECHO MEAS - FS: 28.2 %
BH CV ECHO MEAS - IVS/LVPW: 1
BH CV ECHO MEAS - IVSD: 0.92 CM
BH CV ECHO MEAS - LAT PEAK E' VEL: 6 CM/SEC
BH CV ECHO MEAS - LV DIASTOLIC VOL/BSA (35-75): 45.2 ML/M^2
BH CV ECHO MEAS - LV MASS(C)D: 123.8 GRAMS
BH CV ECHO MEAS - LV MASS(C)DI: 82.3 GRAMS/M^2
BH CV ECHO MEAS - LV MAX PG: 2.2 MMHG
BH CV ECHO MEAS - LV MEAN PG: 1 MMHG
BH CV ECHO MEAS - LV SYSTOLIC VOL/BSA (12-30): 15.9 ML/M^2
BH CV ECHO MEAS - LV V1 MAX: 73.7 CM/SEC
BH CV ECHO MEAS - LV V1 MEAN: 47.4 CM/SEC
BH CV ECHO MEAS - LV V1 VTI: 12.4 CM
BH CV ECHO MEAS - LVIDD: 4.2 CM
BH CV ECHO MEAS - LVIDS: 3 CM
BH CV ECHO MEAS - LVLD AP2: 6.3 CM
BH CV ECHO MEAS - LVLD AP4: 6.8 CM
BH CV ECHO MEAS - LVLS AP2: 5.9 CM
BH CV ECHO MEAS - LVLS AP4: 6.2 CM
BH CV ECHO MEAS - LVOT AREA (M): 2.3 CM^2
BH CV ECHO MEAS - LVOT AREA: 2.4 CM^2
BH CV ECHO MEAS - LVOT DIAM: 1.7 CM
BH CV ECHO MEAS - LVPWD: 0.92 CM
BH CV ECHO MEAS - MED PEAK E' VEL: 5 CM/SEC
BH CV ECHO MEAS - MR MAX PG: 28.9 MMHG
BH CV ECHO MEAS - MR MAX VEL: 268.8 CM/SEC
BH CV ECHO MEAS - MV A DUR: 0.12 SEC
BH CV ECHO MEAS - MV A MAX VEL: 72.8 CM/SEC
BH CV ECHO MEAS - MV DEC SLOPE: 123.2 CM/SEC^2
BH CV ECHO MEAS - MV DEC TIME: 0.29 SEC
BH CV ECHO MEAS - MV E MAX VEL: 36.4 CM/SEC
BH CV ECHO MEAS - MV E/A: 0.5
BH CV ECHO MEAS - MV MAX PG: 2.7 MMHG
BH CV ECHO MEAS - MV MEAN PG: 1.1 MMHG
BH CV ECHO MEAS - MV P1/2T MAX VEL: 36.9 CM/SEC
BH CV ECHO MEAS - MV P1/2T: 87.7 MSEC
BH CV ECHO MEAS - MV V2 MAX: 82.8 CM/SEC
BH CV ECHO MEAS - MV V2 MEAN: 49.5 CM/SEC
BH CV ECHO MEAS - MV V2 VTI: 22.3 CM
BH CV ECHO MEAS - MVA P1/2T LCG: 6 CM^2
BH CV ECHO MEAS - MVA(P1/2T): 2.5 CM^2
BH CV ECHO MEAS - MVA(VTI): 1.3 CM^2
BH CV ECHO MEAS - PA ACC TIME: 0.08 SEC
BH CV ECHO MEAS - PA MAX PG (FULL): 0.59 MMHG
BH CV ECHO MEAS - PA MAX PG: 1.2 MMHG
BH CV ECHO MEAS - PA PR(ACCEL): 42.6 MMHG
BH CV ECHO MEAS - PA V2 MAX: 55.7 CM/SEC
BH CV ECHO MEAS - PI END-D VEL: 100.4 CM/SEC
BH CV ECHO MEAS - PULM A REVS DUR: 0.07 SEC
BH CV ECHO MEAS - PULM A REVS VEL: 20.4 CM/SEC
BH CV ECHO MEAS - PULM DIAS VEL: 35.9 CM/SEC
BH CV ECHO MEAS - PULM S/D: 1.1
BH CV ECHO MEAS - PULM SYS VEL: 38.3 CM/SEC
BH CV ECHO MEAS - PVA(V,A): 2.1 CM^2
BH CV ECHO MEAS - PVA(V,D): 2.1 CM^2
BH CV ECHO MEAS - QP/QS: 0.63
BH CV ECHO MEAS - RV MAX PG: 0.65 MMHG
BH CV ECHO MEAS - RV MEAN PG: 0.3 MMHG
BH CV ECHO MEAS - RV V1 MAX: 40.3 CM/SEC
BH CV ECHO MEAS - RV V1 MEAN: 25.3 CM/SEC
BH CV ECHO MEAS - RV V1 VTI: 6.5 CM
BH CV ECHO MEAS - RVOT AREA: 2.8 CM^2
BH CV ECHO MEAS - RVOT DIAM: 1.9 CM
BH CV ECHO MEAS - SI(AO): 114.8 ML/M^2
BH CV ECHO MEAS - SI(CUBED): 31.6 ML/M^2
BH CV ECHO MEAS - SI(LVOT): 19.5 ML/M^2
BH CV ECHO MEAS - SI(MOD-SP2): 25.9 ML/M^2
BH CV ECHO MEAS - SI(MOD-SP4): 29.2 ML/M^2
BH CV ECHO MEAS - SI(TEICH): 29 ML/M^2
BH CV ECHO MEAS - SV(AO): 172.8 ML
BH CV ECHO MEAS - SV(CUBED): 47.5 ML
BH CV ECHO MEAS - SV(LVOT): 29.4 ML
BH CV ECHO MEAS - SV(MOD-SP2): 39 ML
BH CV ECHO MEAS - SV(MOD-SP4): 44 ML
BH CV ECHO MEAS - SV(RVOT): 18.5 ML
BH CV ECHO MEAS - SV(TEICH): 43.7 ML
BH CV ECHO MEAS - TAPSE (>1.6): 1.9 CM2
BH CV ECHO MEAS - TR MAX VEL: 219.9 CM/SEC
BH CV XLRA - RV BASE: 2.4 CM
BH CV XLRA - TDI S': 11 CM/SEC
E/E' RATIO: 7.5
LEFT ATRIUM VOLUME INDEX: 19 ML/M2
LV EF 2D ECHO EST: 65 %
SINUS: 2.9 CM
STJ: 2.5 CM

## 2018-03-02 PROCEDURE — 25010000002 PERFLUTREN (DEFINITY) 8.476 MG IN SODIUM CHLORIDE 0.9 % 10 ML INJECTION: Performed by: INTERNAL MEDICINE

## 2018-03-02 PROCEDURE — 93306 TTE W/DOPPLER COMPLETE: CPT | Performed by: INTERNAL MEDICINE

## 2018-03-02 PROCEDURE — 93306 TTE W/DOPPLER COMPLETE: CPT

## 2018-03-02 RX ADMIN — PERFLUTREN 1.5 ML: 6.52 INJECTION, SUSPENSION INTRAVENOUS at 13:30

## 2018-03-28 RX ORDER — KETOCONAZOLE 20 MG/G
CREAM TOPICAL 2 TIMES DAILY
Qty: 30 G | Refills: 2 | Status: SHIPPED | OUTPATIENT
Start: 2018-03-28 | End: 2019-04-01 | Stop reason: SDUPTHER

## 2018-04-12 ENCOUNTER — OFFICE VISIT (OUTPATIENT)
Dept: FAMILY MEDICINE CLINIC | Facility: CLINIC | Age: 83
End: 2018-04-12

## 2018-04-12 VITALS
OXYGEN SATURATION: 99 % | DIASTOLIC BLOOD PRESSURE: 80 MMHG | WEIGHT: 124 LBS | HEART RATE: 80 BPM | SYSTOLIC BLOOD PRESSURE: 120 MMHG | TEMPERATURE: 97.8 F | BODY MASS INDEX: 26.03 KG/M2 | HEIGHT: 58 IN

## 2018-04-12 DIAGNOSIS — E78.5 HYPERLIPIDEMIA, UNSPECIFIED HYPERLIPIDEMIA TYPE: ICD-10-CM

## 2018-04-12 DIAGNOSIS — M80.88XG OSTEOPOROTIC COMPRESSION FRACTURE OF SPINE WITH DELAYED HEALING: ICD-10-CM

## 2018-04-12 DIAGNOSIS — J44.9 COPD, MILD (HCC): Primary | ICD-10-CM

## 2018-04-12 DIAGNOSIS — E03.9 ACQUIRED HYPOTHYROIDISM: ICD-10-CM

## 2018-04-12 PROCEDURE — 99214 OFFICE O/P EST MOD 30 MIN: CPT | Performed by: INTERNAL MEDICINE

## 2018-04-12 RX ORDER — ALBUTEROL SULFATE 90 UG/1
2 AEROSOL, METERED RESPIRATORY (INHALATION) EVERY 4 HOURS PRN
Qty: 18 G | Refills: 5 | Status: SHIPPED | OUTPATIENT
Start: 2018-04-12 | End: 2019-11-11 | Stop reason: SDDI

## 2018-04-12 NOTE — PROGRESS NOTES
Subjective complaint is general checkup on health  Emerita Bazan is a 82 y.o. female.     History of Present Illness   Is here today for a general checkup.  She has some questions regarding her visits to the pulmonologist.  He prescribed a short acting inhaler but she didn't quite understand what it was for.  She did not fill it because it was costly.  We did discuss the pulmonologist report.  We did discuss what chronic obstructive pulmonary disease means.  We did discuss that her case is mild.  We discussed her using Ventolin inhaler on an as-needed basis you before she goes to exert herself or if she gets short of breath while she is exerting herself.  We did discuss possibly using a long-acting inhaler should she have trouble operating short acting inhaler.  She also had a episode of coughing.  She got into a severe bout of coughing.  It did seem to aggravate some of her back pain.  She was also having some pain on the right rib cage.  She did see a massage therapist who did not think she had a fracture.  This seems to have resolved on its own.  She has had bad days in terms of back pain and sacral pain.  This seems to be somewhat weather related.  Today the now that the weather is warm she seems to be doing fairly well.  Tylenol still seems to help her pain.  Did discuss her echocardiogram.  We did explain the nature of diastolic dysfunction.  We did discuss the possibility of having her work on exercising more to increase her stamina.  She does have hypothyroidism.  Her last TSH was slightly elevated and we did increase her to the 100 µg of levothyroxine daily.  She is due for recheck on this.  The following portions of the patient's history were reviewed and updated as appropriate: allergies, current medications, past medical history and problem list.    Review of Systems   HENT: Positive for congestion.    Respiratory: Positive for shortness of breath. Negative for cough and wheezing.    Cardiovascular:  Negative for chest pain.   Musculoskeletal: Positive for back pain.       Objective   Physical Exam   Constitutional: She appears well-developed and well-nourished.   HENT:   Tympanic membranes are normal.  There is mild nasal congestion but no significant erythema.  Oral pharynx is clear.   Neck: Carotid bruit is not present. No thyromegaly present.   Cardiovascular: Normal rate, regular rhythm, normal heart sounds and intact distal pulses.    Pulmonary/Chest: Effort normal and breath sounds normal. No respiratory distress. She has no wheezes.   Abdominal: Soft. Bowel sounds are normal. She exhibits no distension. There is no tenderness. There is no guarding.   Musculoskeletal: She exhibits no edema.   Nursing note and vitals reviewed.        Assessment/Plan   Emerita was seen today for follow-up.    Diagnoses and all orders for this visit:    COPD, mild    Osteoporotic compression fracture of spine with delayed healing    Acquired hypothyroidism  -     TSH+Free T4  -     T3, Free    Hyperlipidemia, unspecified hyperlipidemia type    Other orders  -     albuterol (VENTOLIN HFA) 108 (90 Base) MCG/ACT inhaler; Inhale 2 puffs Every 4 (Four) Hours As Needed for Shortness of Air.      Emerita is here today to discuss health problems.  We spent 40 minutes discussing her various test results and the implications thereof.  I did advise her that we can try treating some of the things that we might be able to change.  I am going to have her try using the inhaler.  We may do a long-acting inhaler if she cannot operate a short acting inhaler.  We are going to check on status of her thyroid.  I did discuss that the compression fractures and sacral fracture likely going to intermittently give her problems but it does not seem insurmountable.

## 2018-04-13 LAB
T3FREE SERPL-MCNC: 2.3 PG/ML (ref 2–4.4)
T4 FREE SERPL-MCNC: 1.69 NG/DL (ref 0.93–1.7)
TSH SERPL DL<=0.005 MIU/L-ACNC: 0.83 MIU/ML (ref 0.27–4.2)

## 2018-05-10 ENCOUNTER — OFFICE VISIT (OUTPATIENT)
Dept: FAMILY MEDICINE CLINIC | Facility: CLINIC | Age: 83
End: 2018-05-10

## 2018-05-10 VITALS
OXYGEN SATURATION: 97 % | HEART RATE: 86 BPM | DIASTOLIC BLOOD PRESSURE: 70 MMHG | SYSTOLIC BLOOD PRESSURE: 122 MMHG | HEIGHT: 58 IN | TEMPERATURE: 97.7 F

## 2018-05-10 DIAGNOSIS — M79.89 SWOLLEN THUMB: Primary | ICD-10-CM

## 2018-05-10 DIAGNOSIS — M79.671 RIGHT FOOT PAIN: ICD-10-CM

## 2018-05-10 LAB
BASOPHILS # BLD AUTO: 0.03 10*3/MM3 (ref 0–0.2)
BASOPHILS NFR BLD AUTO: 0.4 % (ref 0–1.5)
EOSINOPHIL # BLD AUTO: 0.18 10*3/MM3 (ref 0–0.7)
EOSINOPHIL NFR BLD AUTO: 2.6 % (ref 0.3–6.2)
ERYTHROCYTE [DISTWIDTH] IN BLOOD BY AUTOMATED COUNT: 13.6 % (ref 11.7–13)
HCT VFR BLD AUTO: 36.5 % (ref 35.6–45.5)
HGB BLD-MCNC: 11.7 G/DL (ref 11.9–15.5)
IMM GRANULOCYTES # BLD: 0.02 10*3/MM3 (ref 0–0.03)
IMM GRANULOCYTES NFR BLD: 0.3 % (ref 0–0.5)
LYMPHOCYTES # BLD AUTO: 1.85 10*3/MM3 (ref 0.9–4.8)
LYMPHOCYTES NFR BLD AUTO: 26.8 % (ref 19.6–45.3)
MCH RBC QN AUTO: 32.4 PG (ref 26.9–32)
MCHC RBC AUTO-ENTMCNC: 32.1 G/DL (ref 32.4–36.3)
MCV RBC AUTO: 101.1 FL (ref 80.5–98.2)
MONOCYTES # BLD AUTO: 0.47 10*3/MM3 (ref 0.2–1.2)
MONOCYTES NFR BLD AUTO: 6.8 % (ref 5–12)
NEUTROPHILS # BLD AUTO: 4.38 10*3/MM3 (ref 1.9–8.1)
NEUTROPHILS NFR BLD AUTO: 63.4 % (ref 42.7–76)
PLATELET # BLD AUTO: 333 10*3/MM3 (ref 140–500)
RBC # BLD AUTO: 3.61 10*6/MM3 (ref 3.9–5.2)
URATE SERPL-MCNC: 4 MG/DL (ref 2.4–5.7)
WBC # BLD AUTO: 6.91 10*3/MM3 (ref 4.5–10.7)

## 2018-05-10 PROCEDURE — 99213 OFFICE O/P EST LOW 20 MIN: CPT | Performed by: INTERNAL MEDICINE

## 2018-05-10 RX ORDER — CEPHALEXIN 500 MG/1
500 CAPSULE ORAL 2 TIMES DAILY
Qty: 14 CAPSULE | Refills: 0 | Status: SHIPPED | OUTPATIENT
Start: 2018-05-10 | End: 2018-11-30

## 2018-05-10 RX ORDER — COLCHICINE 0.6 MG/1
0.6 TABLET ORAL DAILY
Qty: 10 TABLET | Refills: 0 | Status: SHIPPED | OUTPATIENT
Start: 2018-05-10 | End: 2018-11-30

## 2018-05-10 NOTE — PROGRESS NOTES
Subjective complaint is right thumb pain  Emerita Bazan is a 82 y.o. female.     History of Present Illness   Emerita is here today for complaints of throbbing pain in her  thumb.  She fell and landed on her hand approximately 3 weeks ago.  Initially she had some swelling in the thumb that resolved.  A few days ago the swelling returned.  It is now red and warm.  She has never had gout before.  She also injured her foot in the past.  She did see Dr. Finch.  She was put in a walking boot.  This seemed to make things worse.  She is wanting to see someone else.  She also has a question regarding the hepatitis A vaccine.  The following portions of the patient's history were reviewed and updated as appropriate: allergies, current medications, past medical history and problem list.    Review of Systems   Constitutional: Negative for chills and fever.   Musculoskeletal: Positive for arthralgias and joint swelling.       Objective   Physical Exam   Musculoskeletal:   He has fairly decent flexion and extension of the thumb.  There is some erythema and warmth that the DIP joint.  There is some tenderness to palpation of the DIP joint.  Her right foot shows a second toe that crosses over the third.   Nursing note and vitals reviewed.        Assessment/Plan   Emerita was seen today for hand injury, foot injury and immunizations.    Diagnoses and all orders for this visit:    Swollen thumb  -     Uric Acid  -     CBC & Differential    Right foot pain  -     Ambulatory Referral to Podiatry    Other orders  -     colchicine 0.6 MG tablet; Take 1 tablet by mouth Daily.  -     cephalexin (KEFLEX) 500 MG capsule; Take 1 capsule by mouth 2 (Two) Times a Day.      Emerita is here today for evaluation of a swollen thumb.  I would wonder whether she is gotten some gout in a posttraumatic joint.  Also possibility of infection.  I am going to treat her with some cephalexin as well as culture seen.  I am going to check a CBC and uric acid.  She is  going to contact me if the swelling worsens.  I did advise her that she can get hepatitis A vaccine at the pharmacist.  I did give her prescription for that.

## 2018-07-02 ENCOUNTER — HOSPITAL ENCOUNTER (OUTPATIENT)
Dept: MAMMOGRAPHY | Facility: HOSPITAL | Age: 83
Discharge: HOME | End: 2018-07-02
Attending: OBSTETRICS & GYNECOLOGY | Admitting: OBSTETRICS & GYNECOLOGY

## 2018-07-02 DIAGNOSIS — R92.2 BREAST DENSITY: ICD-10-CM

## 2018-07-02 PROCEDURE — 77065 DX MAMMO INCL CAD UNI: CPT

## 2018-08-13 RX ORDER — LEVOTHYROXINE SODIUM 0.1 MG/1
TABLET ORAL
Qty: 90 TABLET | Refills: 0 | Status: SHIPPED | OUTPATIENT
Start: 2018-08-13 | End: 2018-11-08 | Stop reason: SDUPTHER

## 2018-11-08 RX ORDER — LEVOTHYROXINE SODIUM 0.1 MG/1
100 TABLET ORAL DAILY
Qty: 90 TABLET | Refills: 1 | Status: SHIPPED | OUTPATIENT
Start: 2018-11-08 | End: 2019-05-17 | Stop reason: SDUPTHER

## 2018-11-12 RX ORDER — ESTRADIOL 0.5 MG/1
TABLET ORAL
Qty: 90 TABLET | Refills: 1 | Status: SHIPPED | OUTPATIENT
Start: 2018-11-12 | End: 2018-12-24 | Stop reason: SDUPTHER

## 2018-11-30 ENCOUNTER — OFFICE VISIT (OUTPATIENT)
Dept: FAMILY MEDICINE CLINIC | Facility: CLINIC | Age: 83
End: 2018-11-30

## 2018-11-30 VITALS
HEART RATE: 90 BPM | OXYGEN SATURATION: 98 % | DIASTOLIC BLOOD PRESSURE: 70 MMHG | SYSTOLIC BLOOD PRESSURE: 110 MMHG | WEIGHT: 127.6 LBS | BODY MASS INDEX: 26.79 KG/M2 | HEIGHT: 58 IN | TEMPERATURE: 98.1 F

## 2018-11-30 DIAGNOSIS — M25.473 ANKLE SWELLING, UNSPECIFIED LATERALITY: ICD-10-CM

## 2018-11-30 DIAGNOSIS — T14.8XXA HEMATOMA: ICD-10-CM

## 2018-11-30 DIAGNOSIS — Z23 NEED FOR IMMUNIZATION AGAINST INFLUENZA: ICD-10-CM

## 2018-11-30 DIAGNOSIS — E78.5 HYPERLIPIDEMIA, UNSPECIFIED HYPERLIPIDEMIA TYPE: ICD-10-CM

## 2018-11-30 DIAGNOSIS — E03.9 ACQUIRED HYPOTHYROIDISM: Primary | ICD-10-CM

## 2018-11-30 LAB
ALBUMIN SERPL-MCNC: 4.1 G/DL (ref 3.5–5.2)
ALBUMIN/GLOB SERPL: 1.5 G/DL
ALP SERPL-CCNC: 55 U/L (ref 39–117)
ALT SERPL-CCNC: 12 U/L (ref 1–33)
AST SERPL-CCNC: 19 U/L (ref 1–32)
BASOPHILS # BLD AUTO: 0.07 10*3/MM3 (ref 0–0.2)
BASOPHILS NFR BLD AUTO: 0.9 % (ref 0–1.5)
BILIRUB SERPL-MCNC: 0.4 MG/DL (ref 0.1–1.2)
BUN SERPL-MCNC: 21 MG/DL (ref 8–23)
BUN/CREAT SERPL: 21.6 (ref 7–25)
CALCIUM SERPL-MCNC: 9.5 MG/DL (ref 8.6–10.5)
CHLORIDE SERPL-SCNC: 102 MMOL/L (ref 98–107)
CHOLEST SERPL-MCNC: 207 MG/DL (ref 0–200)
CO2 SERPL-SCNC: 28.2 MMOL/L (ref 22–29)
CREAT SERPL-MCNC: 0.97 MG/DL (ref 0.57–1)
EOSINOPHIL # BLD AUTO: 0.22 10*3/MM3 (ref 0–0.7)
EOSINOPHIL NFR BLD AUTO: 2.7 % (ref 0.3–6.2)
ERYTHROCYTE [DISTWIDTH] IN BLOOD BY AUTOMATED COUNT: 14.3 % (ref 11.7–13)
GLOBULIN SER CALC-MCNC: 2.8 GM/DL
GLUCOSE SERPL-MCNC: 86 MG/DL (ref 65–99)
HCT VFR BLD AUTO: 38.9 % (ref 35.6–45.5)
HDLC SERPL-MCNC: 61 MG/DL (ref 40–60)
HGB BLD-MCNC: 12.7 G/DL (ref 11.9–15.5)
IMM GRANULOCYTES # BLD: 0.01 10*3/MM3 (ref 0–0.03)
IMM GRANULOCYTES NFR BLD: 0.1 % (ref 0–0.5)
LDLC SERPL CALC-MCNC: 114 MG/DL (ref 0–100)
LYMPHOCYTES # BLD AUTO: 2.31 10*3/MM3 (ref 0.9–4.8)
LYMPHOCYTES NFR BLD AUTO: 28.4 % (ref 19.6–45.3)
MCH RBC QN AUTO: 32.6 PG (ref 26.9–32)
MCHC RBC AUTO-ENTMCNC: 32.6 G/DL (ref 32.4–36.3)
MCV RBC AUTO: 100 FL (ref 80.5–98.2)
MONOCYTES # BLD AUTO: 0.62 10*3/MM3 (ref 0.2–1.2)
MONOCYTES NFR BLD AUTO: 7.6 % (ref 5–12)
NEUTROPHILS # BLD AUTO: 4.92 10*3/MM3 (ref 1.9–8.1)
NEUTROPHILS NFR BLD AUTO: 60.4 % (ref 42.7–76)
PLATELET # BLD AUTO: 308 10*3/MM3 (ref 140–500)
POTASSIUM SERPL-SCNC: 4.1 MMOL/L (ref 3.5–5.2)
PROT SERPL-MCNC: 6.9 G/DL (ref 6–8.5)
RBC # BLD AUTO: 3.89 10*6/MM3 (ref 3.9–5.2)
SODIUM SERPL-SCNC: 140 MMOL/L (ref 136–145)
T4 FREE SERPL-MCNC: 1.78 NG/DL (ref 0.93–1.7)
TRIGL SERPL-MCNC: 160 MG/DL (ref 0–150)
TSH SERPL DL<=0.005 MIU/L-ACNC: 0.55 MIU/ML (ref 0.27–4.2)
VLDLC SERPL CALC-MCNC: 32 MG/DL (ref 5–40)
WBC # BLD AUTO: 8.14 10*3/MM3 (ref 4.5–10.7)

## 2018-11-30 PROCEDURE — 90662 IIV NO PRSV INCREASED AG IM: CPT | Performed by: INTERNAL MEDICINE

## 2018-11-30 PROCEDURE — G0008 ADMIN INFLUENZA VIRUS VAC: HCPCS | Performed by: INTERNAL MEDICINE

## 2018-11-30 PROCEDURE — 99214 OFFICE O/P EST MOD 30 MIN: CPT | Performed by: INTERNAL MEDICINE

## 2018-11-30 NOTE — PROGRESS NOTES
Subjective chief complaint is checkup on thyroid  Emerita Bazan is a 83 y.o. female.     History of Present Illness   Emerita is here today for checkup on her thyroid.  She is now on 100 µg of levothyroxine on a daily basis.  She basically has been doing well.  Despite exercising she has struggled to lose weight.  We did discuss her weight and I do not think her weight is bad.  I actually think that she is appropriate.  Since I last saw her she did sprain her ankle.  She is having some problems with swelling in the ankles.  She does have varicose veins.  She has had some prior brain surgery.  In the fall she did injure her right hand.  There still a residual nodule on the outer aspect of her hand.  10 use to have trouble with cough and breathing.  She is not able to adequately operate the inhaler.  She is in need of a flu shot and the second hepatitis A shot.  The following portions of the patient's history were reviewed and updated as appropriate: allergies, current medications, past medical history and problem list.    Review of Systems   Respiratory: Positive for shortness of breath. Negative for chest tightness.    Cardiovascular: Positive for leg swelling. Negative for chest pain.   Neurological: Negative for dizziness, light-headedness and headache.        He does report that she occasionally cannot come up with a main but is not really experiencing any dementia.       Objective   Physical Exam   Constitutional: She appears well-developed and well-nourished.   Neck: Carotid bruit is not present. No thyromegaly present.   Cardiovascular: Normal rate, regular rhythm, normal heart sounds and intact distal pulses. Exam reveals no friction rub.   No murmur heard.  Pulmonary/Chest: Effort normal and breath sounds normal. No respiratory distress. She has no wheezes.   Abdominal: Soft. Bowel sounds are normal. She exhibits no distension and no mass. There is no tenderness. There is no guarding.   Musculoskeletal: She  exhibits no edema.   There is a residual geode hematoma of the right fifth metacarpal.         Assessment/Plan   Emerita was seen today for follow-up.    Diagnoses and all orders for this visit:    Acquired hypothyroidism  -     TSH+Free T4  -     CBC & Differential    Hyperlipidemia, unspecified hyperlipidemia type  -     Comprehensive Metabolic Panel  -     Lipid Panel    Need for immunization against influenza  -     Fluzone High Dose =>65Years; Standing  -     Fluzone High Dose =>65Years    Ankle swelling, unspecified laterality    Hematoma      Emerita is here today for follow-up.  We are going to check some appropriate labs today.  We have administered a flu shot.  She has had the Prevnar vaccine.  Her next visit we will administer the Pneumovax.  She has had a Zostavax but is not had the shingles.

## 2018-12-24 ENCOUNTER — PROCEDURE VISIT (OUTPATIENT)
Dept: OBSTETRICS AND GYNECOLOGY | Age: 83
End: 2018-12-24

## 2018-12-24 ENCOUNTER — APPOINTMENT (OUTPATIENT)
Dept: WOMENS IMAGING | Facility: HOSPITAL | Age: 83
End: 2018-12-24

## 2018-12-24 ENCOUNTER — OFFICE VISIT (OUTPATIENT)
Dept: OBSTETRICS AND GYNECOLOGY | Age: 83
End: 2018-12-24

## 2018-12-24 VITALS
DIASTOLIC BLOOD PRESSURE: 74 MMHG | SYSTOLIC BLOOD PRESSURE: 124 MMHG | HEIGHT: 58 IN | WEIGHT: 125 LBS | BODY MASS INDEX: 26.24 KG/M2

## 2018-12-24 DIAGNOSIS — Z92.29 HISTORY OF POSTMENOPAUSAL HRT: ICD-10-CM

## 2018-12-24 DIAGNOSIS — M80.88XG OSTEOPOROTIC COMPRESSION FRACTURE OF SPINE WITH DELAYED HEALING: ICD-10-CM

## 2018-12-24 DIAGNOSIS — Z12.31 VISIT FOR SCREENING MAMMOGRAM: Primary | ICD-10-CM

## 2018-12-24 DIAGNOSIS — Z01.419 ENCOUNTER FOR GYNECOLOGICAL EXAMINATION: Primary | ICD-10-CM

## 2018-12-24 PROCEDURE — 99397 PER PM REEVAL EST PAT 65+ YR: CPT | Performed by: OBSTETRICS & GYNECOLOGY

## 2018-12-24 PROCEDURE — 77067 SCR MAMMO BI INCL CAD: CPT | Performed by: RADIOLOGY

## 2018-12-24 PROCEDURE — 77067 SCR MAMMO BI INCL CAD: CPT | Performed by: OBSTETRICS & GYNECOLOGY

## 2018-12-24 RX ORDER — ESTRADIOL 0.5 MG/1
0.5 TABLET ORAL DAILY
Qty: 90 TABLET | Refills: 3 | Status: SHIPPED | OUTPATIENT
Start: 2018-12-24 | End: 2020-02-10

## 2018-12-24 RX ORDER — MEDROXYPROGESTERONE ACETATE 2.5 MG/1
2.5 TABLET ORAL DAILY
Qty: 90 TABLET | Refills: 3 | Status: SHIPPED | OUTPATIENT
Start: 2018-12-24 | End: 2020-02-25
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
## 2018-12-27 LAB
CYTOLOGIST CVX/VAG CYTO: NORMAL
CYTOLOGY CVX/VAG DOC THIN PREP: NORMAL
DX ICD CODE: NORMAL
HIV 1 & 2 AB SER-IMP: NORMAL
HPV I/H RISK 4 DNA CVX QL PROBE+SIG AMP: NEGATIVE
OTHER STN SPEC: NORMAL
PATH REPORT.FINAL DX SPEC: NORMAL
STAT OF ADQ CVX/VAG CYTO-IMP: NORMAL

## 2019-04-01 RX ORDER — KETOCONAZOLE 20 MG/G
CREAM TOPICAL EVERY 12 HOURS SCHEDULED
Qty: 30 G | Refills: 0 | Status: SHIPPED | OUTPATIENT
Start: 2019-04-01 | End: 2019-10-30 | Stop reason: SDUPTHER

## 2019-04-01 RX ORDER — KETOCONAZOLE 20 MG/G
CREAM TOPICAL
Qty: 30 G | Refills: 0 | Status: SHIPPED | OUTPATIENT
Start: 2019-04-01 | End: 2019-04-01 | Stop reason: SDUPTHER

## 2019-04-17 ENCOUNTER — TELEPHONE (OUTPATIENT)
Dept: FAMILY MEDICINE CLINIC | Facility: CLINIC | Age: 84
End: 2019-04-17

## 2019-04-17 NOTE — TELEPHONE ENCOUNTER
----- Message from Lupe Rae sent at 4/17/2019  8:58 AM EDT -----  Patient number [phone]    Patient traveling to [facility], there is a measles outbreak - she would like to speak with you regarding her concerns.     The patient feels fairly sure that she had measles as a child.  She knows she was not likely vaccinated.  Advised that she really probably has no concerns but we could check her immune status if she wanted.  She is going to forego this.

## 2019-05-17 ENCOUNTER — OFFICE VISIT (OUTPATIENT)
Dept: FAMILY MEDICINE CLINIC | Facility: CLINIC | Age: 84
End: 2019-05-17

## 2019-05-17 VITALS
WEIGHT: 127 LBS | HEIGHT: 58 IN | OXYGEN SATURATION: 97 % | TEMPERATURE: 97 F | BODY MASS INDEX: 26.66 KG/M2 | HEART RATE: 76 BPM

## 2019-05-17 DIAGNOSIS — E78.5 HYPERLIPIDEMIA, UNSPECIFIED HYPERLIPIDEMIA TYPE: ICD-10-CM

## 2019-05-17 DIAGNOSIS — E03.9 ACQUIRED HYPOTHYROIDISM: ICD-10-CM

## 2019-05-17 DIAGNOSIS — Z01.84 IMMUNITY STATUS TESTING: ICD-10-CM

## 2019-05-17 DIAGNOSIS — R05.9 COUGH: Primary | ICD-10-CM

## 2019-05-17 DIAGNOSIS — R53.83 FATIGUE, UNSPECIFIED TYPE: ICD-10-CM

## 2019-05-17 DIAGNOSIS — R09.81 NASAL CONGESTION: ICD-10-CM

## 2019-05-17 DIAGNOSIS — H61.23 BILATERAL IMPACTED CERUMEN: ICD-10-CM

## 2019-05-17 PROCEDURE — 99214 OFFICE O/P EST MOD 30 MIN: CPT | Performed by: INTERNAL MEDICINE

## 2019-05-17 RX ORDER — LEVOTHYROXINE SODIUM 0.1 MG/1
100 TABLET ORAL DAILY
Qty: 90 TABLET | Refills: 1 | Status: SHIPPED | OUTPATIENT
Start: 2019-05-17 | End: 2019-12-07 | Stop reason: SDUPTHER

## 2019-05-17 RX ORDER — OMEPRAZOLE 40 MG/1
40 CAPSULE, DELAYED RELEASE ORAL DAILY
Qty: 90 CAPSULE | Refills: 0 | Status: SHIPPED | OUTPATIENT
Start: 2019-05-17 | End: 2019-11-11 | Stop reason: SDDI

## 2019-05-17 RX ORDER — MONTELUKAST SODIUM 10 MG/1
10 TABLET ORAL NIGHTLY
Qty: 30 TABLET | Refills: 5 | Status: SHIPPED | OUTPATIENT
Start: 2019-05-17 | End: 2019-11-11 | Stop reason: SDDI
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
## 2019-05-20 DIAGNOSIS — R06.02 SHORTNESS OF BREATH: ICD-10-CM

## 2019-05-20 DIAGNOSIS — E03.9 ACQUIRED HYPOTHYROIDISM: Primary | ICD-10-CM

## 2019-05-20 LAB
ALBUMIN SERPL-MCNC: 3.5 G/DL (ref 3.5–5.2)
ALBUMIN/GLOB SERPL: 1.1 G/DL
ALP SERPL-CCNC: 59 U/L (ref 39–117)
ALT SERPL-CCNC: 9 U/L (ref 1–33)
AST SERPL-CCNC: 15 U/L (ref 1–32)
BASOPHILS # BLD AUTO: (no result) 10*3/UL
BILIRUB SERPL-MCNC: 0.3 MG/DL (ref 0.2–1.2)
BUN SERPL-MCNC: 20 MG/DL (ref 8–23)
BUN/CREAT SERPL: 19.2 (ref 7–25)
CALCIUM SERPL-MCNC: 9.8 MG/DL (ref 8.6–10.5)
CHLORIDE SERPL-SCNC: 102 MMOL/L (ref 98–107)
CHOLEST SERPL-MCNC: 181 MG/DL (ref 0–200)
CO2 SERPL-SCNC: 23.8 MMOL/L (ref 22–29)
CREAT SERPL-MCNC: 1.04 MG/DL (ref 0.57–1)
EOSINOPHIL # BLD AUTO: (no result) 10*3/UL
EOSINOPHIL NFR BLD AUTO: (no result) %
ERYTHROCYTE [SEDIMENTATION RATE] IN BLOOD BY WESTERGREN METHOD: NORMAL MM/HR
GLOBULIN SER CALC-MCNC: 3.1 GM/DL
GLUCOSE SERPL-MCNC: 88 MG/DL (ref 65–99)
HCT VFR BLD AUTO: (no result) %
HDLC SERPL-MCNC: 58 MG/DL (ref 40–60)
HGB BLD-MCNC: (no result) G/DL
LDLC SERPL CALC-MCNC: 97 MG/DL (ref 0–100)
LYMPHOCYTES # BLD AUTO: (no result) 10*3/UL
LYMPHOCYTES NFR BLD AUTO: (no result) %
MEV IGG SER IA-ACNC: >300 AU/ML
MONOCYTES NFR BLD AUTO: (no result) %
NEUTROPHILS NFR BLD AUTO: (no result) %
PLATELET # BLD AUTO: (no result) 10*3/UL
POTASSIUM SERPL-SCNC: 4.4 MMOL/L (ref 3.5–5.2)
PROT SERPL-MCNC: 6.6 G/DL (ref 6–8.5)
RBC # BLD AUTO: (no result) 10*6/UL
REQUEST PROBLEM: NORMAL
SODIUM SERPL-SCNC: 138 MMOL/L (ref 136–145)
T4 FREE SERPL-MCNC: 1.01 NG/DL (ref 0.93–1.7)
TRIGL SERPL-MCNC: 129 MG/DL (ref 0–150)
TSH SERPL DL<=0.005 MIU/L-ACNC: 5.15 MIU/ML (ref 0.27–4.2)
VLDLC SERPL CALC-MCNC: 25.8 MG/DL
WBC # BLD AUTO: (no result) 10*3/MM3

## 2019-05-28 ENCOUNTER — HOSPITAL ENCOUNTER (OUTPATIENT)
Dept: CT IMAGING | Facility: HOSPITAL | Age: 84
Discharge: HOME | End: 2019-05-28
Admitting: INTERNAL MEDICINE

## 2019-05-28 DIAGNOSIS — R09.81 NASAL CONGESTION: ICD-10-CM

## 2019-05-28 DIAGNOSIS — R05.9 COUGH: ICD-10-CM

## 2019-05-28 PROCEDURE — 70486 CT MAXILLOFACIAL W/O DYE: CPT

## 2019-05-30 ENCOUNTER — TELEPHONE (OUTPATIENT)
Dept: FAMILY MEDICINE CLINIC | Facility: CLINIC | Age: 84
End: 2019-05-30

## 2019-05-30 DIAGNOSIS — H91.90 HEARING LOSS, UNSPECIFIED HEARING LOSS TYPE, UNSPECIFIED LATERALITY: Primary | ICD-10-CM

## 2019-05-30 DIAGNOSIS — H61.20 IMPACTED CERUMEN, UNSPECIFIED LATERALITY: ICD-10-CM

## 2019-05-30 NOTE — TELEPHONE ENCOUNTER
----- Message from Vandana Boles sent at 5/30/2019  9:16 AM EDT -----  PT MISSED YOUR CALL YESTERDAY REGARDING CT, PLEASE CALL [phone]   Lab results were discussed with the patient.  We also discussed her CAT scan.  We are going to refer her to an ear nose and throat doctor for her diminished hearing and wax.

## 2019-07-01 ENCOUNTER — RESULTS ENCOUNTER (OUTPATIENT)
Dept: FAMILY MEDICINE CLINIC | Facility: CLINIC | Age: 84
End: 2019-07-01

## 2019-07-01 DIAGNOSIS — E03.9 ACQUIRED HYPOTHYROIDISM: ICD-10-CM

## 2019-07-01 DIAGNOSIS — R06.02 SHORTNESS OF BREATH: ICD-10-CM

## 2019-07-03 ENCOUNTER — OFFICE VISIT (OUTPATIENT)
Dept: FAMILY MEDICINE CLINIC | Facility: CLINIC | Age: 84
End: 2019-07-03

## 2019-07-03 VITALS
HEART RATE: 91 BPM | OXYGEN SATURATION: 97 % | HEIGHT: 58 IN | TEMPERATURE: 97.9 F | BODY MASS INDEX: 26.66 KG/M2 | WEIGHT: 127 LBS | DIASTOLIC BLOOD PRESSURE: 67 MMHG | SYSTOLIC BLOOD PRESSURE: 110 MMHG

## 2019-07-03 DIAGNOSIS — R51.9 OCCIPITAL HEADACHE: ICD-10-CM

## 2019-07-03 DIAGNOSIS — M54.6 THORACIC BACK PAIN, UNSPECIFIED BACK PAIN LATERALITY, UNSPECIFIED CHRONICITY: ICD-10-CM

## 2019-07-03 DIAGNOSIS — M62.838 CERVICAL PARASPINAL MUSCLE SPASM: ICD-10-CM

## 2019-07-03 DIAGNOSIS — R10.2 PELVIC PAIN: ICD-10-CM

## 2019-07-03 DIAGNOSIS — R05.9 COUGH: Primary | ICD-10-CM

## 2019-07-03 DIAGNOSIS — E03.9 ACQUIRED HYPOTHYROIDISM: ICD-10-CM

## 2019-07-03 DIAGNOSIS — M20.011 MALLET FINGER OF RIGHT HAND: ICD-10-CM

## 2019-07-03 LAB
BASOPHILS # BLD AUTO: 0.07 10*3/MM3 (ref 0–0.2)
BASOPHILS NFR BLD AUTO: 0.7 % (ref 0–1.5)
EOSINOPHIL # BLD AUTO: 0.31 10*3/MM3 (ref 0–0.4)
EOSINOPHIL NFR BLD AUTO: 3 % (ref 0.3–6.2)
ERYTHROCYTE [DISTWIDTH] IN BLOOD BY AUTOMATED COUNT: 13.8 % (ref 12.3–15.4)
HCT VFR BLD AUTO: 38.1 % (ref 34–46.6)
HGB BLD-MCNC: 11.9 G/DL (ref 12–15.9)
IMM GRANULOCYTES # BLD AUTO: 0.03 10*3/MM3 (ref 0–0.05)
IMM GRANULOCYTES NFR BLD AUTO: 0.3 % (ref 0–0.5)
LYMPHOCYTES # BLD AUTO: 1.62 10*3/MM3 (ref 0.7–3.1)
LYMPHOCYTES NFR BLD AUTO: 15.7 % (ref 19.6–45.3)
MCH RBC QN AUTO: 31.2 PG (ref 26.6–33)
MCHC RBC AUTO-ENTMCNC: 31.2 G/DL (ref 31.5–35.7)
MCV RBC AUTO: 100 FL (ref 79–97)
MONOCYTES # BLD AUTO: 0.8 10*3/MM3 (ref 0.1–0.9)
MONOCYTES NFR BLD AUTO: 7.8 % (ref 5–12)
NEUTROPHILS # BLD AUTO: 7.49 10*3/MM3 (ref 1.7–7)
NEUTROPHILS NFR BLD AUTO: 72.5 % (ref 42.7–76)
NRBC BLD AUTO-RTO: 0 /100 WBC (ref 0–0.2)
PLATELET # BLD AUTO: 419 10*3/MM3 (ref 140–450)
RBC # BLD AUTO: 3.81 10*6/MM3 (ref 3.77–5.28)
T4 FREE SERPL-MCNC: 1.67 NG/DL (ref 0.93–1.7)
TSH SERPL DL<=0.005 MIU/L-ACNC: 2.01 MIU/ML (ref 0.27–4.2)
WBC # BLD AUTO: 10.32 10*3/MM3 (ref 3.4–10.8)

## 2019-07-03 PROCEDURE — 99214 OFFICE O/P EST MOD 30 MIN: CPT | Performed by: INTERNAL MEDICINE
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
## 2019-07-04 ENCOUNTER — HOSPITAL ENCOUNTER (OUTPATIENT)
Dept: GENERAL RADIOLOGY | Facility: HOSPITAL | Age: 84
Discharge: HOME | End: 2019-07-04

## 2019-07-04 ENCOUNTER — HOSPITAL ENCOUNTER (OUTPATIENT)
Dept: GENERAL RADIOLOGY | Facility: HOSPITAL | Age: 84
Discharge: HOME | End: 2019-07-04
Admitting: INTERNAL MEDICINE

## 2019-07-04 DIAGNOSIS — R51.9 OCCIPITAL HEADACHE: ICD-10-CM

## 2019-07-04 DIAGNOSIS — M54.6 THORACIC BACK PAIN, UNSPECIFIED BACK PAIN LATERALITY, UNSPECIFIED CHRONICITY: ICD-10-CM

## 2019-07-04 DIAGNOSIS — M62.838 CERVICAL PARASPINAL MUSCLE SPASM: ICD-10-CM

## 2019-07-04 DIAGNOSIS — R10.2 PELVIC PAIN: ICD-10-CM

## 2019-07-04 PROCEDURE — 72170 X-RAY EXAM OF PELVIS: CPT

## 2019-07-04 PROCEDURE — 72110 X-RAY EXAM L-2 SPINE 4/>VWS: CPT

## 2019-07-04 PROCEDURE — 72072 X-RAY EXAM THORAC SPINE 3VWS: CPT

## 2019-07-04 PROCEDURE — 72040 X-RAY EXAM NECK SPINE 2-3 VW: CPT

## 2019-07-08 ENCOUNTER — HOSPITAL ENCOUNTER (OUTPATIENT)
Dept: CT IMAGING | Facility: HOSPITAL | Age: 84
Discharge: HOME | End: 2019-07-08
Admitting: INTERNAL MEDICINE

## 2019-07-08 DIAGNOSIS — R05.9 COUGH: ICD-10-CM

## 2019-07-08 PROCEDURE — 71250 CT THORAX DX C-: CPT

## 2019-08-09 ENCOUNTER — OFFICE VISIT (OUTPATIENT)
Dept: FAMILY MEDICINE CLINIC | Facility: CLINIC | Age: 84
End: 2019-08-09

## 2019-08-09 VITALS
SYSTOLIC BLOOD PRESSURE: 110 MMHG | DIASTOLIC BLOOD PRESSURE: 70 MMHG | BODY MASS INDEX: 26.03 KG/M2 | HEIGHT: 58 IN | OXYGEN SATURATION: 98 % | TEMPERATURE: 97.7 F | HEART RATE: 77 BPM | WEIGHT: 124 LBS

## 2019-08-09 DIAGNOSIS — D64.9 ANEMIA, MILD: ICD-10-CM

## 2019-08-09 DIAGNOSIS — S22.000A COMPRESSION FRACTURE OF BODY OF THORACIC VERTEBRA (HCC): Primary | ICD-10-CM

## 2019-08-09 DIAGNOSIS — J47.9 BRONCHIECTASIS WITHOUT COMPLICATION (HCC): ICD-10-CM

## 2019-08-09 LAB
BASOPHILS # BLD AUTO: 0.05 10*3/MM3 (ref 0–0.2)
BASOPHILS NFR BLD AUTO: 0.7 % (ref 0–1.5)
EOSINOPHIL # BLD AUTO: 0.33 10*3/MM3 (ref 0–0.4)
EOSINOPHIL NFR BLD AUTO: 4.4 % (ref 0.3–6.2)
ERYTHROCYTE [DISTWIDTH] IN BLOOD BY AUTOMATED COUNT: 14.8 % (ref 12.3–15.4)
FERRITIN SERPL-MCNC: 343 NG/ML (ref 13–150)
HCT VFR BLD AUTO: 37.2 % (ref 34–46.6)
HGB BLD-MCNC: 11.2 G/DL (ref 12–15.9)
IMM GRANULOCYTES # BLD AUTO: 0.02 10*3/MM3 (ref 0–0.05)
IMM GRANULOCYTES NFR BLD AUTO: 0.3 % (ref 0–0.5)
IRON SATN MFR SERPL: 26 % (ref 20–50)
IRON SERPL-MCNC: 77 MCG/DL (ref 37–145)
LYMPHOCYTES # BLD AUTO: 1.62 10*3/MM3 (ref 0.7–3.1)
LYMPHOCYTES NFR BLD AUTO: 21.4 % (ref 19.6–45.3)
MCH RBC QN AUTO: 30.4 PG (ref 26.6–33)
MCHC RBC AUTO-ENTMCNC: 30.1 G/DL (ref 31.5–35.7)
MCV RBC AUTO: 100.8 FL (ref 79–97)
MONOCYTES # BLD AUTO: 0.67 10*3/MM3 (ref 0.1–0.9)
MONOCYTES NFR BLD AUTO: 8.9 % (ref 5–12)
NEUTROPHILS # BLD AUTO: 4.88 10*3/MM3 (ref 1.7–7)
NEUTROPHILS NFR BLD AUTO: 64.3 % (ref 42.7–76)
NRBC BLD AUTO-RTO: 0 /100 WBC (ref 0–0.2)
PLATELET # BLD AUTO: 352 10*3/MM3 (ref 140–450)
RBC # BLD AUTO: 3.69 10*6/MM3 (ref 3.77–5.28)
TIBC SERPL-MCNC: 301 MCG/DL
UIBC SERPL-MCNC: 224 MCG/DL (ref 112–346)
WBC # BLD AUTO: 7.57 10*3/MM3 (ref 3.4–10.8)

## 2019-08-09 PROCEDURE — 99214 OFFICE O/P EST MOD 30 MIN: CPT | Performed by: INTERNAL MEDICINE
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
## 2019-08-15 DIAGNOSIS — D64.9 ANEMIA, UNSPECIFIED TYPE: Primary | ICD-10-CM

## 2019-09-09 ENCOUNTER — OFFICE VISIT (OUTPATIENT)
Dept: NEUROSURGERY | Facility: CLINIC | Age: 84
End: 2019-09-09

## 2019-09-09 VITALS
WEIGHT: 124.4 LBS | HEIGHT: 58 IN | HEART RATE: 91 BPM | BODY MASS INDEX: 26.11 KG/M2 | SYSTOLIC BLOOD PRESSURE: 110 MMHG | DIASTOLIC BLOOD PRESSURE: 64 MMHG

## 2019-09-09 DIAGNOSIS — S22.050G: Primary | ICD-10-CM

## 2019-09-09 DIAGNOSIS — M85.89 OSTEOPENIA OF MULTIPLE SITES: ICD-10-CM

## 2019-09-09 PROBLEM — M80.88XG: Status: RESOLVED | Noted: 2017-10-19 | Resolved: 2019-09-09

## 2019-09-09 PROCEDURE — 99204 OFFICE O/P NEW MOD 45 MIN: CPT | Performed by: PHYSICIAN ASSISTANT

## 2019-09-09 RX ORDER — IPRATROPIUM BROMIDE 42 UG/1
SPRAY, METERED NASAL
COMMUNITY
Start: 2019-06-13 | End: 2019-11-11 | Stop reason: SDDI
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
## 2019-09-12 ENCOUNTER — HOSPITAL ENCOUNTER (OUTPATIENT)
Dept: MRI IMAGING | Facility: HOSPITAL | Age: 84
Discharge: HOME | End: 2019-09-12
Admitting: PHYSICIAN ASSISTANT

## 2019-09-12 DIAGNOSIS — M85.89 OSTEOPENIA OF MULTIPLE SITES: ICD-10-CM

## 2019-09-12 DIAGNOSIS — S22.050G: ICD-10-CM

## 2019-09-12 PROCEDURE — 72146 MRI CHEST SPINE W/O DYE: CPT

## 2019-09-19 NOTE — PROGRESS NOTES
Subjective   Patient ID: Emerita Bazan is a 83 y.o. female is here today for follow-up to discuss thoracic MRI results.  Patient is not wearing a back brace.    Patient was last seen  9.9.19 for intermittent mild thoracic spine back pain.  Patient states that her back pain is only when she is coughing and/or certain physical activities.  She states that she is using a new inhaler that is helping a lot and she is not coughing as much.    Patient denies leg pain, weakness or N/T.  Patient denies problems with her balance and gait.        Back Pain   This is a chronic problem. The problem occurs intermittently. The problem has been gradually improving since onset. The pain is present in the thoracic spine. The pain does not radiate. The pain is at a severity of 3/10. The pain is mild. The symptoms are aggravated by twisting, position and bending. Pertinent negatives include no numbness or weakness.       The following portions of the patient's history were reviewed and updated as appropriate: allergies, current medications, past family history, past medical history, past social history, past surgical history and problem list.    Review of Systems   Genitourinary: Negative for difficulty urinating.   Musculoskeletal: Positive for back pain. Negative for arthralgias, gait problem and myalgias.   Neurological: Negative for weakness and numbness.   All other systems reviewed and are negative.      Objective   Physical Exam   Constitutional: She is oriented to person, place, and time. She appears well-developed and well-nourished.   HENT:   Head: Normocephalic and atraumatic.   Right Ear: External ear normal.   Left Ear: External ear normal.   Eyes: Conjunctivae and EOM are normal. Pupils are equal, round, and reactive to light. Right eye exhibits no discharge. Left eye exhibits no discharge.   Neck: Normal range of motion. Neck supple. No tracheal deviation present.   Pulmonary/Chest: Effort normal. No stridor. No  respiratory distress.   Musculoskeletal: Normal range of motion. She exhibits no edema, tenderness or deformity.   Neurological: She is alert and oriented to person, place, and time. She has normal strength. She displays no atrophy and no tremor. No cranial nerve deficit or sensory deficit. She exhibits normal muscle tone. She displays no seizure activity. Gait normal.   Skin: Skin is warm and dry.   Psychiatric: She has a normal mood and affect. Her behavior is normal. Judgment and thought content normal.   Nursing note and vitals reviewed.    Neurologic Exam     Mental Status   Oriented to person, place, and time.     Cranial Nerves     CN III, IV, VI   Pupils are equal, round, and reactive to light.  Extraocular motions are normal.     Motor Exam     Strength   Strength 5/5 throughout.       Assessment/Plan   Independent Review of Radiographic Studies:    I did review the new thoracic MRI which shows the old T5 fracture.  There is approximately 60 to 70% loss of height.  Only mild retropulsion.  No severe stenosis.  No acute fractures.  Medical Decision Making:    Ms. Bazan continues to have some thoracic pain.  However again it is much improved since switching to a new inhaler which has greatly reduced her coughing spells.  At this point the pain is fairly mild and well controlled.  I reviewed the MRI with her and explained that the fracture at T5 is old which was already known and has completely healed.  A kyphoplasty is not indicated given that the fracture has completely healed.  We did discuss the possibility of epidurals if the pain got worse but at this point she is very clear that she is not interested in any additional treatment.  She will call as needed.  Emerita was seen today for back pain.    Diagnoses and all orders for this visit:    Closed wedge compression fracture of T5 vertebra with delayed healing      Return if symptoms worsen or fail to improve.

## 2019-09-24 ENCOUNTER — OFFICE VISIT (OUTPATIENT)
Dept: NEUROSURGERY | Facility: CLINIC | Age: 84
End: 2019-09-24

## 2019-09-24 VITALS
SYSTOLIC BLOOD PRESSURE: 117 MMHG | HEART RATE: 68 BPM | BODY MASS INDEX: 26.03 KG/M2 | WEIGHT: 124 LBS | DIASTOLIC BLOOD PRESSURE: 74 MMHG | HEIGHT: 58 IN

## 2019-09-24 DIAGNOSIS — S22.050G: Primary | ICD-10-CM

## 2019-09-24 PROBLEM — M51.34 DDD (DEGENERATIVE DISC DISEASE), THORACIC: Status: ACTIVE | Noted: 2019-09-24

## 2019-09-24 PROCEDURE — 99213 OFFICE O/P EST LOW 20 MIN: CPT | Performed by: PHYSICIAN ASSISTANT

## 2019-10-11 ENCOUNTER — FLU SHOT (OUTPATIENT)
Dept: FAMILY MEDICINE CLINIC | Facility: CLINIC | Age: 84
End: 2019-10-11

## 2019-10-30 ENCOUNTER — OFFICE VISIT (OUTPATIENT)
Dept: FAMILY MEDICINE CLINIC | Facility: CLINIC | Age: 84
End: 2019-10-30

## 2019-10-30 VITALS
DIASTOLIC BLOOD PRESSURE: 74 MMHG | SYSTOLIC BLOOD PRESSURE: 130 MMHG | HEART RATE: 78 BPM | TEMPERATURE: 97.6 F | BODY MASS INDEX: 25.92 KG/M2 | HEIGHT: 58 IN | OXYGEN SATURATION: 98 %

## 2019-10-30 DIAGNOSIS — J47.9 BRONCHIECTASIS WITHOUT COMPLICATION (HCC): Primary | ICD-10-CM

## 2019-10-30 DIAGNOSIS — S22.000A COMPRESSION FRACTURE OF BODY OF THORACIC VERTEBRA (HCC): ICD-10-CM

## 2019-10-30 PROCEDURE — 99213 OFFICE O/P EST LOW 20 MIN: CPT | Performed by: INTERNAL MEDICINE

## 2019-10-30 RX ORDER — KETOCONAZOLE 20 MG/G
CREAM TOPICAL EVERY 12 HOURS SCHEDULED
Qty: 30 G | Refills: 5 | Status: SHIPPED | OUTPATIENT
Start: 2019-10-30 | End: 2019-11-11

## 2019-11-11 ENCOUNTER — APPOINTMENT (OUTPATIENT)
Dept: LAB | Facility: HOSPITAL | Age: 84
End: 2019-11-11

## 2019-11-11 ENCOUNTER — CONSULT (OUTPATIENT)
Dept: ONCOLOGY | Facility: CLINIC | Age: 84
End: 2019-11-11

## 2019-11-11 VITALS
RESPIRATION RATE: 16 BRPM | HEART RATE: 82 BPM | TEMPERATURE: 97.6 F | BODY MASS INDEX: 26.55 KG/M2 | OXYGEN SATURATION: 97 % | HEIGHT: 58 IN | SYSTOLIC BLOOD PRESSURE: 133 MMHG | DIASTOLIC BLOOD PRESSURE: 76 MMHG | WEIGHT: 126.5 LBS

## 2019-11-11 DIAGNOSIS — D64.9 ANEMIA, UNSPECIFIED TYPE: Primary | ICD-10-CM

## 2019-11-11 LAB
BASOPHILS # BLD AUTO: 0.04 10*3/MM3 (ref 0–0.2)
BASOPHILS NFR BLD AUTO: 0.5 % (ref 0–1.5)
DEPRECATED RDW RBC AUTO: 49.3 FL (ref 37–54)
EOSINOPHIL # BLD AUTO: 0.14 10*3/MM3 (ref 0–0.4)
EOSINOPHIL NFR BLD AUTO: 1.8 % (ref 0.3–6.2)
ERYTHROCYTE [DISTWIDTH] IN BLOOD BY AUTOMATED COUNT: 13.9 % (ref 12.3–15.4)
HCT VFR BLD AUTO: 38.8 % (ref 34–46.6)
HGB BLD-MCNC: 12.9 G/DL (ref 12–15.9)
IMM GRANULOCYTES # BLD AUTO: 0.03 10*3/MM3 (ref 0–0.05)
IMM GRANULOCYTES NFR BLD AUTO: 0.4 % (ref 0–0.5)
LYMPHOCYTES # BLD AUTO: 1.28 10*3/MM3 (ref 0.7–3.1)
LYMPHOCYTES NFR BLD AUTO: 16 % (ref 19.6–45.3)
MCH RBC QN AUTO: 32 PG (ref 26.6–33)
MCHC RBC AUTO-ENTMCNC: 33.2 G/DL (ref 31.5–35.7)
MCV RBC AUTO: 96.3 FL (ref 79–97)
MONOCYTES # BLD AUTO: 0.89 10*3/MM3 (ref 0.1–0.9)
MONOCYTES NFR BLD AUTO: 11.2 % (ref 5–12)
NEUTROPHILS # BLD AUTO: 5.6 10*3/MM3 (ref 1.7–7)
NEUTROPHILS NFR BLD AUTO: 70.1 % (ref 42.7–76)
NRBC BLD AUTO-RTO: 0 /100 WBC (ref 0–0.2)
PLATELET # BLD AUTO: 261 10*3/MM3 (ref 140–450)
PMV BLD AUTO: 9.1 FL (ref 6–12)
RBC # BLD AUTO: 4.03 10*6/MM3 (ref 3.77–5.28)
WBC NRBC COR # BLD: 7.98 10*3/MM3 (ref 3.4–10.8)

## 2019-11-11 PROCEDURE — G0463 HOSPITAL OUTPT CLINIC VISIT: HCPCS | Performed by: INTERNAL MEDICINE

## 2019-11-11 PROCEDURE — 99214 OFFICE O/P EST MOD 30 MIN: CPT | Performed by: INTERNAL MEDICINE

## 2019-11-11 PROCEDURE — 36416 COLLJ CAPILLARY BLOOD SPEC: CPT

## 2019-11-11 PROCEDURE — 85025 COMPLETE CBC W/AUTO DIFF WBC: CPT
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
## 2019-11-25 ENCOUNTER — TELEPHONE (OUTPATIENT)
Dept: FAMILY MEDICINE CLINIC | Facility: CLINIC | Age: 84
End: 2019-11-25

## 2019-11-25 RX ORDER — MECLIZINE HYDROCHLORIDE 25 MG/1
25 TABLET ORAL 3 TIMES DAILY PRN
Qty: 30 TABLET | Refills: 1 | Status: SHIPPED | OUTPATIENT
Start: 2019-11-25 | End: 2021-04-19
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
## 2019-12-09 RX ORDER — LEVOTHYROXINE SODIUM 0.1 MG/1
TABLET ORAL
Qty: 90 TABLET | Refills: 0 | Status: SHIPPED | OUTPATIENT
Start: 2019-12-09 | End: 2019-12-11 | Stop reason: SDUPTHER

## 2019-12-11 RX ORDER — LEVOTHYROXINE SODIUM 0.1 MG/1
100 TABLET ORAL DAILY
Qty: 90 TABLET | Refills: 1 | Status: SHIPPED | OUTPATIENT
Start: 2019-12-11 | End: 2020-06-10

## 2019-12-11 RX ORDER — LEVOTHYROXINE SODIUM 0.1 MG/1
TABLET ORAL
Qty: 90 TABLET | Refills: 0 | OUTPATIENT
Start: 2019-12-11

## 2019-12-13 ENCOUNTER — HOSPITAL ENCOUNTER (OUTPATIENT)
Dept: BONE DENSITY | Facility: HOSPITAL | Age: 84
Discharge: HOME | End: 2019-12-13
Admitting: INTERNAL MEDICINE

## 2019-12-13 DIAGNOSIS — S22.000A COMPRESSION FRACTURE OF BODY OF THORACIC VERTEBRA (HCC): ICD-10-CM

## 2019-12-13 PROCEDURE — 77080 DXA BONE DENSITY AXIAL: CPT

## 2019-12-26 ENCOUNTER — OFFICE VISIT (OUTPATIENT)
Dept: FAMILY MEDICINE CLINIC | Facility: CLINIC | Age: 84
End: 2019-12-26

## 2019-12-26 VITALS
DIASTOLIC BLOOD PRESSURE: 70 MMHG | BODY MASS INDEX: 26.19 KG/M2 | HEIGHT: 58 IN | HEART RATE: 84 BPM | TEMPERATURE: 98 F | OXYGEN SATURATION: 95 % | SYSTOLIC BLOOD PRESSURE: 120 MMHG

## 2019-12-26 DIAGNOSIS — M81.0 AGE-RELATED OSTEOPOROSIS WITHOUT CURRENT PATHOLOGICAL FRACTURE: ICD-10-CM

## 2019-12-26 DIAGNOSIS — J47.9 BRONCHIECTASIS WITHOUT COMPLICATION (HCC): Primary | ICD-10-CM

## 2019-12-26 PROCEDURE — 99213 OFFICE O/P EST LOW 20 MIN: CPT | Performed by: INTERNAL MEDICINE

## 2019-12-26 NOTE — PROGRESS NOTES
Subjective Complaint is follow-up on her bronchiectasis  Emerita Bazan is a 84 y.o. female.     History of Present Illness   Emerita is here today for follow-up.  She is going to be going to a specialty clinic in [place] regarding her bronchiectasis.  He has a number of questions about the scheduling of tests that they are doing.  We did review her daily plan and what test they had installed for her.  I think all of these are appropriate.  She does report that she has been feeling a little bit worse in terms of her breathing.  Her oxygen saturation is a little bit lower here today.  We did discuss her recent bone density test.  There is been very little change from one that I can see done a few years ago.  She was previously on some oral medications but does not want to take those.  We did discuss the possibility of injectables.  The following portions of the patient's history were reviewed and updated as appropriate: allergies, current medications, past family history, past medical history, past social history, past surgical history and problem list.    Review of Systems   Constitutional: Negative for chills and fever.   Respiratory: Positive for cough and shortness of breath.        Objective   Physical Exam   Constitutional: She appears well-developed and well-nourished.   Cardiovascular: Normal rate, regular rhythm and normal heart sounds.   Pulmonary/Chest: Effort normal and breath sounds normal. She has no wheezes. She has no rales.   Musculoskeletal: She exhibits no edema.   Nursing note and vitals reviewed.        Assessment/Plan   Emerita was seen today for follow-up.    Diagnoses and all orders for this visit:    Bronchiectasis without complication (CMS/[facility])    Age-related osteoporosis without current pathological fracture      Emerita is here today for follow-up on her bronchiectasis.  Her lungs are clear.  I did redemonstrate how to use her inhaler appropriately.  We did discuss treatment for her osteoporosis  and she is going to wait until after she sees her gynecologist at the first part of the year.

## 2020-01-02 ENCOUNTER — PROCEDURE VISIT (OUTPATIENT)
Dept: OBSTETRICS AND GYNECOLOGY | Age: 85
End: 2020-01-02

## 2020-01-02 ENCOUNTER — APPOINTMENT (OUTPATIENT)
Dept: WOMENS IMAGING | Facility: HOSPITAL | Age: 85
End: 2020-01-02

## 2020-01-02 DIAGNOSIS — Z12.31 VISIT FOR SCREENING MAMMOGRAM: Primary | ICD-10-CM

## 2020-01-02 PROCEDURE — 77067 SCR MAMMO BI INCL CAD: CPT | Performed by: OBSTETRICS & GYNECOLOGY

## 2020-01-02 PROCEDURE — 77067 SCR MAMMO BI INCL CAD: CPT | Performed by: RADIOLOGY

## 2020-01-27 ENCOUNTER — OFFICE VISIT (OUTPATIENT)
Dept: FAMILY MEDICINE CLINIC | Facility: CLINIC | Age: 85
End: 2020-01-27

## 2020-01-27 VITALS
OXYGEN SATURATION: 97 % | TEMPERATURE: 95.7 F | SYSTOLIC BLOOD PRESSURE: 114 MMHG | DIASTOLIC BLOOD PRESSURE: 70 MMHG | BODY MASS INDEX: 26.03 KG/M2 | WEIGHT: 124 LBS | HEIGHT: 58 IN | HEART RATE: 75 BPM

## 2020-01-27 DIAGNOSIS — M75.31 CALCIFIC TENDONITIS OF RIGHT SHOULDER: ICD-10-CM

## 2020-01-27 DIAGNOSIS — H61.21 IMPACTED CERUMEN OF RIGHT EAR: ICD-10-CM

## 2020-01-27 DIAGNOSIS — J47.9 BRONCHIECTASIS WITHOUT COMPLICATION (HCC): Primary | ICD-10-CM

## 2020-01-27 DIAGNOSIS — N89.8 VAGINAL DISCHARGE: ICD-10-CM

## 2020-01-27 PROCEDURE — 99214 OFFICE O/P EST MOD 30 MIN: CPT | Performed by: INTERNAL MEDICINE

## 2020-01-27 RX ORDER — FLUTICASONE PROPIONATE 50 MCG
SPRAY, SUSPENSION (ML) NASAL
COMMUNITY
Start: 2020-01-19 | End: 2021-04-19

## 2020-01-27 RX ORDER — ALBUTEROL SULFATE 90 UG/1
AEROSOL, METERED RESPIRATORY (INHALATION)
COMMUNITY
Start: 2020-01-19 | End: 2020-07-08
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
## 2020-01-29 ENCOUNTER — OFFICE VISIT (OUTPATIENT)
Dept: OBSTETRICS AND GYNECOLOGY | Age: 85
End: 2020-01-29

## 2020-01-29 VITALS — SYSTOLIC BLOOD PRESSURE: 118 MMHG | DIASTOLIC BLOOD PRESSURE: 68 MMHG | BODY MASS INDEX: 25.47 KG/M2 | HEIGHT: 59 IN

## 2020-01-29 DIAGNOSIS — N89.8 VAGINAL DISCHARGE: Primary | ICD-10-CM

## 2020-01-29 PROCEDURE — 99213 OFFICE O/P EST LOW 20 MIN: CPT | Performed by: PHYSICIAN ASSISTANT

## 2020-01-29 NOTE — PROGRESS NOTES
"Subjective     Chief Complaint   Patient presents with   • Gynecologic Exam     c/o vaginal discharge       Emerita Bazan is a 84 y.o.  whose LMP is No LMP recorded. Patient is postmenopausal. presents with vaginal d/c    Vaginal d/c x 3 wks  \"pea green\"  No odor  Nothing new as far as medical history is concerned   Denies std risk    Pt of Dr Bergeron    No Additional Complaints Reported    The following portions of the patient's history were reviewed and updated as appropriate:vital signs, allergies, current medications, past family history, past medical history, past social history, past surgical history and problem list      Review of Systems   Genitourinary:positive for vaginal discharge     Objective      /68   Ht 148.6 cm (58.5\")   Breastfeeding No   BMI 25.47 kg/m²     Physical Exam    General:   alert, comfortable and no distress   Heart: Not performed today   Lungs: Not performed today.   Breast: Not performed today   Neck: na   Abdomen: {Not performed today   CVA: Not performed today   Pelvis: External genitalia: normal general appearance  Vaginal: atrophic mucosa and discharge, green and ph increased  Cervix: not seen  Adnexa: normal bimanual exam   Extremities: Not performed today   Neurologic: AOx3. Gait normal.    Psychiatric: Normal affect, judgement, and mood       Lab Review   Labs: No data reviewed     Imaging   No data reviewed    Assessment/Plan     ASSESSMENT  1. Vaginal discharge        PLAN  1.   Orders Placed This Encounter   Procedures   • NuSwab BV & Candida - , Vagina       2. Plan vaginal swab today, possibly BV, offered metrogel vaginally but pt declines. Spending a fortune on medicine already    Follow up: ELISA Castañeda  2020           "

## 2020-02-02 LAB
A VAGINAE DNA VAG QL NAA+PROBE: NORMAL SCORE
BVAB2 DNA VAG QL NAA+PROBE: NORMAL SCORE
C ALBICANS DNA VAG QL NAA+PROBE: NEGATIVE
C GLABRATA DNA VAG QL NAA+PROBE: NEGATIVE
MEGA1 DNA VAG QL NAA+PROBE: NORMAL SCORE

## 2020-02-04 ENCOUNTER — TREATMENT (OUTPATIENT)
Dept: PHYSICAL THERAPY | Facility: CLINIC | Age: 85
End: 2020-02-04

## 2020-02-04 DIAGNOSIS — M25.511 RIGHT SHOULDER PAIN, UNSPECIFIED CHRONICITY: Primary | ICD-10-CM

## 2020-02-04 DIAGNOSIS — M75.01 ADHESIVE CAPSULITIS OF RIGHT SHOULDER: ICD-10-CM

## 2020-02-04 DIAGNOSIS — R29.898 WEAKNESS OF SHOULDER: ICD-10-CM

## 2020-02-04 PROCEDURE — 97110 THERAPEUTIC EXERCISES: CPT | Performed by: PHYSICAL THERAPIST

## 2020-02-04 PROCEDURE — 97161 PT EVAL LOW COMPLEX 20 MIN: CPT | Performed by: PHYSICAL THERAPIST
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
## 2020-02-07 ENCOUNTER — TELEPHONE (OUTPATIENT)
Dept: FAMILY MEDICINE CLINIC | Facility: CLINIC | Age: 85
End: 2020-02-07

## 2020-02-07 DIAGNOSIS — H61.23 BILATERAL IMPACTED CERUMEN: Primary | ICD-10-CM

## 2020-02-07 NOTE — TELEPHONE ENCOUNTER
PLEASE CALL PT TO DISCUSS MEDICATIONS?  SHE HAS A REPORT FROM [place] TO DISCUSS SHE DOES NOT UNDERSTAND IT.  CALL [phone]    I advised that the patient could forward the emails to me or have them email me directly from the [place].  She did advise that she wanted to see Dr. Bustamante to have the wax removed from her ears.  We will make the referral.

## 2020-02-10 RX ORDER — ESTRADIOL 0.5 MG/1
TABLET ORAL
Qty: 90 TABLET | Refills: 2 | Status: SHIPPED | OUTPATIENT
Start: 2020-02-10 | End: 2020-11-06 | Stop reason: SDUPTHER

## 2020-02-11 ENCOUNTER — TREATMENT (OUTPATIENT)
Dept: PHYSICAL THERAPY | Facility: CLINIC | Age: 85
End: 2020-02-11

## 2020-02-11 DIAGNOSIS — M25.511 RIGHT SHOULDER PAIN, UNSPECIFIED CHRONICITY: Primary | ICD-10-CM

## 2020-02-11 DIAGNOSIS — R29.898 WEAKNESS OF SHOULDER: ICD-10-CM

## 2020-02-11 DIAGNOSIS — M75.01 ADHESIVE CAPSULITIS OF RIGHT SHOULDER: ICD-10-CM

## 2020-02-11 PROCEDURE — 97110 THERAPEUTIC EXERCISES: CPT | Performed by: PHYSICAL THERAPIST
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
## 2020-02-11 NOTE — TELEPHONE ENCOUNTER
The [facility] in [place] called and they are not sure what other records are needed. She said she can send but just needs to know specifically what we may be looking for. She has sent the entire chart and quite a bit is scanned in starting back in January 2020.    Is there anything else you may need and we can contact to get? She just didn't want to resend over 100 pages and it not be what you're looking for.    Please advise, thanks  [facility] [phone]

## 2020-02-18 ENCOUNTER — TREATMENT (OUTPATIENT)
Dept: PHYSICAL THERAPY | Facility: CLINIC | Age: 85
End: 2020-02-18

## 2020-02-18 DIAGNOSIS — R29.898 WEAKNESS OF SHOULDER: ICD-10-CM

## 2020-02-18 DIAGNOSIS — M25.511 RIGHT SHOULDER PAIN, UNSPECIFIED CHRONICITY: Primary | ICD-10-CM

## 2020-02-18 DIAGNOSIS — M75.01 ADHESIVE CAPSULITIS OF RIGHT SHOULDER: ICD-10-CM

## 2020-02-18 PROCEDURE — 97110 THERAPEUTIC EXERCISES: CPT | Performed by: PHYSICAL THERAPIST

## 2020-02-18 NOTE — PROGRESS NOTES
Physical Therapy Daily Progress Note    Patient: Emerita Bazan   : 1935  Diagnosis/ICD-10 Code:  Right shoulder pain, unspecified chronicity [M25.511]  Referring practitioner: Humberto Lopez,*  Date of Initial Visit: Type: THERAPY  Noted: 2020  Today's Date: 2020  Patient seen for 3 sessions           Subjective Was sore last week pain 5/10   Had a massage better but doesn't last  Today pain 0/10 at rest    Objective   PROM guarded all planes with pects over tight  NEW  Supine YTB  ER at 30 ABD x 5  3 sets with no pain   Supine taut band scaption in 3 phases  Up 1 x 3  Up 1  2  X 3  Up 1  2  3  X 5 alld with verbal cueing for scapulo humeral rhythm and slow controlled motion     HORIZONTAL ABDUCTION AT 90  With same pater  2 sets and verbal cueing to minimize compensation  90 degree rhythmic work exacerbation pain so will hold for now    STANDING  Wall slide with bilaterally hands exacerbation pain so modified to just RIGHT arm with LEFT assisting at the elbow if needed to minimize pain and compensation of IR x 5  3 sets    CANE behind the back with both hands slide up; kick out; rest  5 x with verbal cueing to minimize compensation and exacerbation     Reviewed all exercise and updated all written HEP      Assessment/Plan    A; progressing well with supine therex but standing sore   PLAN: progress strengthening in supine then AAROM in standing       Timed:    Manual Therapy:    0     mins  23439;  Therapeutic Exercise:    45     mins  08978;     Neuromuscular Frandy:    0    mins  77447;    Therapeutic Activity:     0     mins  81984;     Gait Trainin     mins  59913;     Ultrasound:     0     mins  49514;    Electrical Stimulation:    0     mins  62262 ( );  Iontophoresis    0     mins 91374;  Aquatic Therapy    0     mins 08971;  Dry Needling              0     mins    Untimed:  Electrical Stimulation:    0     mins  72048 ( );  Mechanical Traction:    0     mins   70544;     Timed Treatment:   45   mins   Total Treatment:     45   mins  aCity Mosquera, PT  Physical Therapist

## 2020-02-20 ENCOUNTER — TREATMENT (OUTPATIENT)
Dept: PHYSICAL THERAPY | Facility: CLINIC | Age: 85
End: 2020-02-20

## 2020-02-20 DIAGNOSIS — M75.01 ADHESIVE CAPSULITIS OF RIGHT SHOULDER: ICD-10-CM

## 2020-02-20 DIAGNOSIS — R29.898 WEAKNESS OF SHOULDER: ICD-10-CM

## 2020-02-20 DIAGNOSIS — M25.511 RIGHT SHOULDER PAIN, UNSPECIFIED CHRONICITY: Primary | ICD-10-CM

## 2020-02-20 PROCEDURE — 97110 THERAPEUTIC EXERCISES: CPT | Performed by: PHYSICAL THERAPIST

## 2020-02-20 NOTE — PROGRESS NOTES
Physical Therapy Daily Progress Note    Patient: Emerita Bazan   : 1935  Diagnosis/ICD-10 Code:  Right shoulder pain, unspecified chronicity [M25.511]  Referring practitioner: Humberto Lopez,*  Date of Initial Visit: Type: THERAPY  Noted: 2020  Today's Date: 2020  Patient seen for 4 sessions           Subjective Shoulder is hurting more today  Pain 3-4/10    Objective     PROM all planes with EDUCATION to relax muscle guarding and let shoulder open up (humeral inferior glide)  REVISED all therex    1- WINDSHIELD WIPER with elbow propped on a towel roll  a. Hand open and thumb going to the ground  b. 5-10x  2- WINDSHIELD WIPER WITH YELLOW BAND elbow propped on a towel roll  a. Hand open and thumb going to the ground  b. Other hand anchors the band at your belly  c. 5-10x slowly  3- “Y” using a STICK  a. Palms up elbows straight  b. Start with stick up toward ceiling  c. Slowly move stick over head  4- “Y” using a TAUT BAND  a. Palms up with elbows straight  b. Start with band to the ceiling  c. Slowly move up over heads  d. Stop  if you feel a pinch or pain  i. STOP and reset shoulder blades  ii. Check that arm is LOCKED and ROTATED    Repeated all using teach back for retention      Assessment/Plan    A: PROM emerging with ER at 30 abd= 60-70 and scaption 140 without pain Will decreaesd vigor of therex to see if symptom improve then progress  PLAN: reassess baseline ex       Timed:    Manual Therapy:    0     mins  69146;  Therapeutic Exercise:    40     mins  87720;     Neuromuscular Frandy:    0    mins  08574;    Therapeutic Activity:     0     mins  71962;     Gait Trainin     mins  50546;     Ultrasound:     0     mins  33605;    Electrical Stimulation:    0     mins  60531 ( );  Iontophoresis    0     mins 50060;  Aquatic Therapy    0     mins 09371;  Dry Needling              0     mins    Untimed:  Electrical Stimulation:    0     mins  05272 ( );  Mechanical  Traction:    0     mins  13798;     Timed Treatment:   40   mins   Total Treatment:     40   mins  Caity Mosquera, PT  Physical Therapist

## 2020-02-21 ENCOUNTER — TELEPHONE (OUTPATIENT)
Dept: FAMILY MEDICINE CLINIC | Facility: CLINIC | Age: 85
End: 2020-02-21

## 2020-02-21 NOTE — TELEPHONE ENCOUNTER
PT RETURNING YOUR CALLING FROM YESTERDAY, PLEASE CALL PT [phone]    We went over the recommendations from the pulmonary clinic in [place].  We will discuss them again at her next appointment

## 2020-02-25 ENCOUNTER — TREATMENT (OUTPATIENT)
Dept: PHYSICAL THERAPY | Facility: CLINIC | Age: 85
End: 2020-02-25

## 2020-02-25 DIAGNOSIS — M25.511 RIGHT SHOULDER PAIN, UNSPECIFIED CHRONICITY: Primary | ICD-10-CM

## 2020-02-25 DIAGNOSIS — R29.898 WEAKNESS OF SHOULDER: ICD-10-CM

## 2020-02-25 DIAGNOSIS — M75.01 ADHESIVE CAPSULITIS OF RIGHT SHOULDER: ICD-10-CM

## 2020-02-25 PROCEDURE — 97110 THERAPEUTIC EXERCISES: CPT | Performed by: PHYSICAL THERAPIST

## 2020-02-25 RX ORDER — MEDROXYPROGESTERONE ACETATE 2.5 MG/1
TABLET ORAL
Qty: 90 TABLET | Refills: 2 | Status: SHIPPED | OUTPATIENT
Start: 2020-02-25 | End: 2021-03-01 | Stop reason: SDUPTHER

## 2020-02-25 NOTE — PROGRESS NOTES
Physical Therapy Daily Progress Note    Patient: Emerita Bazan   : 1935  Diagnosis/ICD-10 Code:  Right shoulder pain, unspecified chronicity [M25.511]  Referring practitioner: Humberto Lopez,*  Date of Initial Visit: Type: THERAPY  Noted: 2020  Today's Date: 2020  Patient seen for 5 sessions           Subjective I feel better but still sore    Objective     PROM all planes  Scaption in supine limited by pectoral tightness     ER in 2 planes guarded   1- WINDSHIELD WIPER with elbow propped on a towel roll  X 10  2- WINDSHIELD WIPER WITH YELLOW BAND elbow propped on a towel rollx x 10  3- “Y” using a STICK  X 10  “Y” using a TAUT BAND  x10  4- STANDING UP TALL use STICK behind the back  Slide up, kick out, rest  10x verbal cueing for posture    5- STANDING UP TALL- difficulty getting RIGHT hand to midline needs LEFT to assist and still difficulty  a. Wash the back-5-10x  b. Pat the back-  5-10x      Assessment/Plan    A: Able to perform all therex with less pain but continued weakness in RIGHT RTC   PLAN: send note to MD for possible MRI to r/o tear       Timed:    Manual Therapy:    0     mins  79637;  Therapeutic Exercise:    35     mins  65903;     Neuromuscular Frandy:    0    mins  99893;    Therapeutic Activity:     0     mins  43770;     Gait Trainin     mins  19015;     Ultrasound:     00     mins  48770;    Electrical Stimulation:    0     mins  87230 ( );  Iontophoresis    0     mins 58991;  Aquatic Therapy    0     mins 63012;  Dry Needling              0     mins    Untimed:  Electrical Stimulation:    0     mins  55821 ( );  Mechanical Traction:    0     mins  06189;     Timed Treatment:   38   mins   Total Treatment:     38   mins  Caity Mosquera, PT  Physical Therapist

## 2020-03-03 ENCOUNTER — TELEPHONE (OUTPATIENT)
Dept: PHYSICAL THERAPY | Facility: CLINIC | Age: 85
End: 2020-03-03

## 2020-03-03 ENCOUNTER — TREATMENT (OUTPATIENT)
Dept: PHYSICAL THERAPY | Facility: CLINIC | Age: 85
End: 2020-03-03

## 2020-03-03 DIAGNOSIS — M75.01 ADHESIVE CAPSULITIS OF RIGHT SHOULDER: ICD-10-CM

## 2020-03-03 DIAGNOSIS — R29.898 WEAKNESS OF SHOULDER: ICD-10-CM

## 2020-03-03 DIAGNOSIS — M25.511 RIGHT SHOULDER PAIN, UNSPECIFIED CHRONICITY: Primary | ICD-10-CM

## 2020-03-03 PROCEDURE — 97110 THERAPEUTIC EXERCISES: CPT | Performed by: PHYSICAL THERAPIST

## 2020-03-03 NOTE — PROGRESS NOTES
Physical Therapy Daily Progress Note    Patient: Emerita Bazan   : 1935  Diagnosis/ICD-10 Code:  Right shoulder pain, unspecified chronicity [M25.511]  Referring practitioner: Humberto Lopez,*  Date of Initial Visit: Type: THERAPY  Noted: 2020  Today's Date: 3/3/2020  Patient seen for 6 sessions           Subjective   I think the shoulder is move better.  It is feeling better with the exercises too    Objective   PROM all planes  Scaption in supine limited by pectoral tightness at 140 and poor inferior glide    ER in 2 planes guarded   1. WINDSHIELD WIPER with elbow propped on a towel roll  X 10  2. WINDSHIELD WIPER WITH YELLOW BAND elbow propped on a towel rollx x 10  3. “Y” using a STICK  X 10  “Y” using a TAUT BAND  x10  PROGRESS to doing in reclining to slowly introduce gravity without exacerbation   4. STANDING UP TALL use STICK behind the back  Slide up, kick out, rest  10x verbal cueing for posture     5. STANDING UP TALL- difficulty getting RIGHT hand to midline needs LEFT to assist and still difficulty  a. Wash the back-5-10x  b. Pat the back-  5-10x   1. * not quite yet able to get to mid line seconary to pain but able to slide up and lift off     Assessment/Plan  A: improve supine passive and AAROM but standing AROM remains significantly compensated with poor scapulo humeral rhythm and superior migration of humeral head.    PLAN: sent note to MD to see if she needs MRI to rule out possible RTC tear         Timed:    Manual Therapy:    0     mins  10444;  Therapeutic Exercise:    40     mins  73649;     Neuromuscular Frandy:    0    mins  36045;    Therapeutic Activity:     0     mins  68771;     Gait Trainin     mins  04307;     Ultrasound:     0     mins  62406;    Electrical Stimulation:    0     mins  63350 ( );  Iontophoresis    0     mins 35995;  Aquatic Therapy    0     mins 91697;  Dry Needling              0     mins    Untimed:  Electrical Stimulation:    0      mins  63681 ( );  Mechanical Traction:    0     mins  17283;     Timed Treatment:   40   mins   Total Treatment:     40   mins  Caity Mosquera, PT  Physical Therapist

## 2020-03-05 ENCOUNTER — TREATMENT (OUTPATIENT)
Dept: PHYSICAL THERAPY | Facility: CLINIC | Age: 85
End: 2020-03-05

## 2020-03-05 DIAGNOSIS — M75.01 ADHESIVE CAPSULITIS OF RIGHT SHOULDER: ICD-10-CM

## 2020-03-05 DIAGNOSIS — M25.511 RIGHT SHOULDER PAIN, UNSPECIFIED CHRONICITY: Primary | ICD-10-CM

## 2020-03-05 DIAGNOSIS — R29.898 WEAKNESS OF SHOULDER: ICD-10-CM

## 2020-03-05 PROCEDURE — 97110 THERAPEUTIC EXERCISES: CPT | Performed by: PHYSICAL THERAPIST

## 2020-03-05 NOTE — PROGRESS NOTES
30-Day / 10-Visit Progress Note         Patient: Emerita Bazan   : 1935  Diagnosis/ICD-10 Code:  Right shoulder pain, unspecified chronicity [M25.511]  Referring practitioner: Humberto Lopez,*  Date of Initial Visit: Type: THERAPY  Noted: 2020  Today's Date: 3/5/2020  Patient seen for 7 sessions      Subjective:     Clinical Progress: improved  Home Program Compliance: Yes  Treatment has included:  therapeutic exercise, manual therapy and patient education with home exercise program     Subjective   Objective   PROM all planes  Scaption in supine limited by pectoral tightness at 140 and poor inferior glide    ER in 2 planes guarded   1. WINDSHIELD WIPER with elbow propped on a towel roll  X 10  2. WINDSHIELD WIPER WITH YELLOW BAND elbow propped on a towel rollx x 10  3. “Y” using a STICK  X 10  “Y” using a TAUT BAND  x10  PROGRESS to doing in reclining to slowly introduce gravity without exacerbation   4. STANDING UP TALL use STICK behind the back  Slide up, kick out, rest  10x verbal cueing for posture     5. STANDING UP TALL- difficulty getting RIGHT hand to midline needs LEFT to assist and still difficulty  a. Wash the back-5-10x  b. Pat the back-  5-10x   1. * not quite yet able to get to mid line seconary to pain but able to slide up and lift off    Assessment/Plan     Plan Goals: STG: to be met by 6 weeks  1- Patient will report pain <2  /10 with light household activities   PARTIALLY MET  Pain is <2/10 but not able to do all household activities > 90 elevation without compensation   2-Patient will increase PROM from ER 30 scaption 110  to  ER 70 scaption 160 without compensation or exacerbation   PARTIALLY MET   PROM ER 62  Scaption 145   3-Patient will be independent with HEP without compensation or exacerbation    ONGOING  LTG: to be met by 12 weeks  1-Pt will report pain < 2 /10 with recreational activities   2-Pt will increase AROM from scaption 65  to 130  And functional IR reach  RIGHT= LEFT without compensation or exacerbation   3-Patient will increase strength from ER 2 /5  to 3+ /5   4-Pt will be independent with comprehsive HEP     Ms Bazan has been progressing well with supine PROM and AAROM and just now able to work in standing for AROM functional reach to chest height before scapular compensation and superior migration of humeral head.  Strength is grossly 2+/5 with pain/impingment with elevation     PLAN: progress strengthening with verbal cueing and modification to minimize exacerbation      Recommendations: Continue as planned  Timeframe: 2 months  Prognosis to achieve goals: good    PT Signature: Caity Mosquera, PT      Based upon review of the patient's progress and continued therapy plan, it is my medical opinion that Emerita Bazan should continue physical therapy treatment at [facility]  [address] [place] [address] [phone].    Signature: __________________________________  Humberto Lopez MD    Timed:  Manual Therapy:    0     mins  03747;  Therapeutic Exercise:    40     mins  17670;     Neuromuscular Frandy:    0    mins  49580;    Therapeutic Activity:     0     mins  13163;     Gait Trainin     mins  35927;     Ultrasound:     0     mins  21523;    Electrical Stimulation:    0     mins  67052 ( );  Iontophoresis    0     mins 40899;  Dry Needling              0     mins    Untimed:  Electrical Stimulation:    0     mins  36249 (MC );  Mechanical Traction:    0     mins  20559;     Timed Treatment:   40   mins   Total Treatment:     40   mins

## 2020-03-10 ENCOUNTER — TREATMENT (OUTPATIENT)
Dept: PHYSICAL THERAPY | Facility: CLINIC | Age: 85
End: 2020-03-10

## 2020-03-10 DIAGNOSIS — R29.898 WEAKNESS OF SHOULDER: ICD-10-CM

## 2020-03-10 DIAGNOSIS — M75.01 ADHESIVE CAPSULITIS OF RIGHT SHOULDER: ICD-10-CM

## 2020-03-10 DIAGNOSIS — M25.511 RIGHT SHOULDER PAIN, UNSPECIFIED CHRONICITY: Primary | ICD-10-CM

## 2020-03-10 PROCEDURE — 97110 THERAPEUTIC EXERCISES: CPT | Performed by: PHYSICAL THERAPIST

## 2020-03-10 NOTE — PROGRESS NOTES
Physical Therapy Daily Progress Note    Patient: Emerita Bazan   : 1935  Diagnosis/ICD-10 Code:  Right shoulder pain, unspecified chronicity [M25.511]  Referring practitioner: Humberto Lopez,*  Date of Initial Visit: Type: THERAPY  Noted: 2020  Today's Date: 3/10/2020  Patient seen for 8 sessions           Subjective Painful last Friday pain 5/10  Had a massage on SAT and felt better for the day then  and Monday bad again  Had to take meds last night so this am put ice on it then a few exercise so now a little better but frustrated with this set back    Objective   SUPINE PROM with noted guarded primarily of pectoral   ER at 30 abd= 70   HORIZONTAL ABDUCTION lacking 30% secondary to pain   ER at 90 abd = 75   Scaption = 140  STRENGTH grossly 2+/5  Infraspinatus and supraspinatus  Standing IR unable to reach behind the back secondary to pain and weakness    Therex ok with Supine AAROM and sidelying ER without weight ( able to go thru 50% ROM with mild discomfort)    Standing AROM now only 60 then scapular compensation secondary to pain and weakness      Assessment/Plan   A; exacerbation with AROM.  Shoulder is weak and painful.  Pectoral guarding affect humeral inferior glide with ROM < 90 scaption.  Function in all planes is painful and compensated     PLAN:  Follow up with primary are on Friday            Timed:    Manual Therapy:    0     mins  27400;  Therapeutic Exercise:    40     mins  71270;     Neuromuscular Frandy:    0    mins  10465;    Therapeutic Activity:     0     mins  88197;     Gait Trainin     mins  35727;     Ultrasound:     0     mins  64851;    Electrical Stimulation:    0     mins  00600 ( );  Iontophoresis    0     mins 17830;  Aquatic Therapy    0     mins 88764;  Dry Needling              0     mins    Untimed:  Electrical Stimulation:    0     mins  21383 (MC );  Mechanical Traction:    0     mins  84854;     Timed Treatment:   40   mins    Total Treatment:     40   mins  Caity Mosquera, PT  Physical Therapist

## 2020-03-13 ENCOUNTER — OFFICE VISIT (OUTPATIENT)
Dept: FAMILY MEDICINE CLINIC | Facility: CLINIC | Age: 85
End: 2020-03-13

## 2020-03-13 VITALS
DIASTOLIC BLOOD PRESSURE: 70 MMHG | HEART RATE: 111 BPM | TEMPERATURE: 96.2 F | SYSTOLIC BLOOD PRESSURE: 124 MMHG | BODY MASS INDEX: 25.47 KG/M2 | HEIGHT: 59 IN | OXYGEN SATURATION: 98 %

## 2020-03-13 DIAGNOSIS — E78.5 HYPERLIPIDEMIA, UNSPECIFIED HYPERLIPIDEMIA TYPE: ICD-10-CM

## 2020-03-13 DIAGNOSIS — E03.9 ACQUIRED HYPOTHYROIDISM: ICD-10-CM

## 2020-03-13 DIAGNOSIS — J47.9 BRONCHIECTASIS WITHOUT COMPLICATION (HCC): ICD-10-CM

## 2020-03-13 DIAGNOSIS — M75.01 ADHESIVE CAPSULITIS OF RIGHT SHOULDER: Primary | ICD-10-CM

## 2020-03-13 DIAGNOSIS — M06.9 RHEUMATOID ARTHRITIS, INVOLVING UNSPECIFIED SITE, UNSPECIFIED RHEUMATOID FACTOR PRESENCE: ICD-10-CM

## 2020-03-13 PROCEDURE — 99214 OFFICE O/P EST MOD 30 MIN: CPT | Performed by: INTERNAL MEDICINE

## 2020-03-13 RX ORDER — CHLORHEXIDINE GLUCONATE 0.12 MG/ML
RINSE ORAL
COMMUNITY
Start: 2020-03-02

## 2020-03-13 NOTE — PROGRESS NOTES
Subjective Chief complaint is follow-up on bronchiectasis but also pain in the right arm  Emerita Bazan is a 84 y.o. female.     History of Present Illness   Emerita is here today for follow-up discussion regarding her bronchiectasis and her visit to [place].  She is also still experiencing some pain in her right arm.  At her last visit we thought she had some diminished abduction and external rotation.  We did refer her for some physical therapy.  Initially the physical therapy did seem to help.  When she started doing some new exercises they have actually made things worse.  We did discuss the possibility of rotator cuff injury but she does not really wish to have any type of surgery to address this and certainly physical therapy could still work on things without an MRI scan.  We did discuss some of the recommendations from her visit to [place] regarding her bronchiectasis.  We are going to set her up for some sputum specimens 3 times a year.  The first 1 will be in May.  We are also going to follow-up on some abnormal rheumatoid test including a ANCA and CCP antibodies.    Past history is remarkable for hyperlipidemia and hypothyroidism.  She is due for some laboratories on this.      The following portions of the patient's history were reviewed and updated as appropriate: allergies, current medications, past family history, past medical history, past social history, past surgical history and problem list.    Review of Systems   Constitutional: Negative for chills and fever.   HENT: Positive for congestion.    Respiratory: Positive for cough.         She is mobilizing sputum fairly well.  It is clearer now than previous.   Gastrointestinal: Positive for constipation.       Objective   Physical Exam   Constitutional: She appears well-developed and well-nourished.   HENT:   Tympanic membranes are normal.  There is some mild nasal congestion but no significant erythema.   Cardiovascular: Normal rate, regular rhythm  and normal heart sounds.   Pulmonary/Chest: Effort normal and breath sounds normal. She has no wheezes. She has no rales.   Musculoskeletal:   She has quite limited abduction of the right shoulder at the present time.   Nursing note and vitals reviewed.        Assessment/Plan   Emerita was seen today for follow-up.    Diagnoses and all orders for this visit:    Adhesive capsulitis of right shoulder  -     Ambulatory Referral to Orthopedic Surgery    Bronchiectasis without complication (CMS/HCC)  -     Cyclic Citrul Peptide Antibody, IgG / IgA; Future  -     ANCA Panel; Future  -     Rheumatoid Factor; Future    Acquired hypothyroidism  -     TSH+Free T4; Future    Rheumatoid arthritis, involving unspecified site, unspecified rheumatoid factor presence (CMS/HCC)  -     Cyclic Citrul Peptide Antibody, IgG / IgA; Future  -     ANCA Panel; Future  -     Rheumatoid Factor; Future    Hyperlipidemia, unspecified hyperlipidemia type  -     Lipid Panel; Future  -     Comprehensive Metabolic Panel; Future      Emerita is here today for follow-up on her bronchiectasis but also shoulder pain.  For her shoulder pain I am going to refer her to Dr. Aries De La O for possible shoulder injection so that she may continue her physical therapy.  We are going to order the follow-up test recommended by [place].  We are going to do sputum specimens about every 4 months.  The first will be in May.

## 2020-03-14 ENCOUNTER — LAB (OUTPATIENT)
Dept: LAB | Facility: HOSPITAL | Age: 85
End: 2020-03-14

## 2020-03-14 DIAGNOSIS — J47.9 BRONCHIECTASIS WITHOUT COMPLICATION (HCC): ICD-10-CM

## 2020-03-14 DIAGNOSIS — M06.9 RHEUMATOID ARTHRITIS, INVOLVING UNSPECIFIED SITE, UNSPECIFIED RHEUMATOID FACTOR PRESENCE: ICD-10-CM

## 2020-03-14 DIAGNOSIS — E03.9 ACQUIRED HYPOTHYROIDISM: ICD-10-CM

## 2020-03-14 DIAGNOSIS — E78.5 HYPERLIPIDEMIA, UNSPECIFIED HYPERLIPIDEMIA TYPE: ICD-10-CM

## 2020-03-14 LAB
ALBUMIN SERPL-MCNC: 4.3 G/DL (ref 3.5–5.2)
ALBUMIN/GLOB SERPL: 1.9 G/DL
ALP SERPL-CCNC: 50 U/L (ref 39–117)
ALT SERPL W P-5'-P-CCNC: 15 U/L (ref 1–33)
ANION GAP SERPL CALCULATED.3IONS-SCNC: 13.8 MMOL/L (ref 5–15)
AST SERPL-CCNC: 18 U/L (ref 1–32)
BILIRUB SERPL-MCNC: 0.2 MG/DL (ref 0.2–1.2)
BUN BLD-MCNC: 22 MG/DL (ref 8–23)
BUN/CREAT SERPL: 24.4 (ref 7–25)
CALCIUM SPEC-SCNC: 9.1 MG/DL (ref 8.6–10.5)
CHLORIDE SERPL-SCNC: 105 MMOL/L (ref 98–107)
CHOLEST SERPL-MCNC: 196 MG/DL (ref 0–200)
CHROMATIN AB SERPL-ACNC: 26.3 IU/ML (ref 0–14)
CO2 SERPL-SCNC: 23.2 MMOL/L (ref 22–29)
CREAT BLD-MCNC: 0.9 MG/DL (ref 0.57–1)
GFR SERPL CREATININE-BSD FRML MDRD: 60 ML/MIN/1.73
GLOBULIN UR ELPH-MCNC: 2.3 GM/DL
GLUCOSE BLD-MCNC: 99 MG/DL (ref 65–99)
HDLC SERPL-MCNC: 69 MG/DL (ref 40–60)
LDLC SERPL CALC-MCNC: 100 MG/DL (ref 0–100)
LDLC/HDLC SERPL: 1.45 {RATIO}
POTASSIUM BLD-SCNC: 4.1 MMOL/L (ref 3.5–5.2)
PROT SERPL-MCNC: 6.6 G/DL (ref 6–8.5)
SODIUM BLD-SCNC: 142 MMOL/L (ref 136–145)
T4 FREE SERPL-MCNC: 1.59 NG/DL (ref 0.93–1.7)
TRIGL SERPL-MCNC: 136 MG/DL (ref 0–150)
TSH SERPL DL<=0.05 MIU/L-ACNC: 0.11 UIU/ML (ref 0.27–4.2)
VLDLC SERPL-MCNC: 27.2 MG/DL (ref 5–40)

## 2020-03-14 PROCEDURE — 80061 LIPID PANEL: CPT

## 2020-03-14 PROCEDURE — 36415 COLL VENOUS BLD VENIPUNCTURE: CPT

## 2020-03-14 PROCEDURE — 84439 ASSAY OF FREE THYROXINE: CPT

## 2020-03-14 PROCEDURE — 86200 CCP ANTIBODY: CPT

## 2020-03-14 PROCEDURE — 86431 RHEUMATOID FACTOR QUANT: CPT

## 2020-03-14 PROCEDURE — 84443 ASSAY THYROID STIM HORMONE: CPT

## 2020-03-14 PROCEDURE — 86256 FLUORESCENT ANTIBODY TITER: CPT

## 2020-03-14 PROCEDURE — 80053 COMPREHEN METABOLIC PANEL: CPT

## 2020-03-14 PROCEDURE — 83520 IMMUNOASSAY QUANT NOS NONAB: CPT

## 2020-03-16 LAB — CCP IGA+IGG SERPL IA-ACNC: 27 UNITS (ref 0–19)

## 2020-03-16 NOTE — ADDENDUM NOTE
Addended by: LUIS MUNOZ on: 3/16/2020 10:22 AM     Modules accepted: Level of Service     Referred To Mid-Level For Closure Text (Leave Blank If You Do Not Want): After obtaining clear surgical margins the patient was sent to a mid-level provider for surgical repair.  The patient understands they will receive post-surgical care and follow-up from the mid-level provider.

## 2020-03-17 ENCOUNTER — TREATMENT (OUTPATIENT)
Dept: PHYSICAL THERAPY | Facility: CLINIC | Age: 85
End: 2020-03-17

## 2020-03-17 DIAGNOSIS — R29.898 WEAKNESS OF SHOULDER: ICD-10-CM

## 2020-03-17 DIAGNOSIS — M75.01 ADHESIVE CAPSULITIS OF RIGHT SHOULDER: ICD-10-CM

## 2020-03-17 DIAGNOSIS — M25.511 RIGHT SHOULDER PAIN, UNSPECIFIED CHRONICITY: Primary | ICD-10-CM

## 2020-03-17 LAB
C-ANCA TITR SER IF: ABNORMAL TITER
MYELOPEROXIDASE AB SER-ACNC: 15 U/ML (ref 0–9)
P-ANCA ATYPICAL TITR SER IF: ABNORMAL TITER
P-ANCA TITR SER IF: ABNORMAL TITER
PROTEINASE3 AB SER IA-ACNC: <3.5 U/ML (ref 0–3.5)

## 2020-03-17 PROCEDURE — PTNOCHG PR CUSTOM PT NO CHARGE VISIT: Performed by: PHYSICAL THERAPIST

## 2020-03-17 NOTE — PROGRESS NOTES
Physical Therapy Daily Progress Note    Patient: Emerita Bazan   : 1935  Diagnosis/ICD-10 Code:  Right shoulder pain, unspecified chronicity [M25.511]  Referring practitioner: Humberto Lopez,*  Date of Initial Visit: Type: THERAPY  Noted: 2020  Today's Date: 3/17/2020  Patient seen for 9 sessions           Subjective Last Wednesday went howm and went to bed with excruciating RIGHT shoulder pain an Thrusday had to help lift RIGHT arm with left.  Did not have enough strength to cut lettuce. 3    Objective   Patient not seen today as her pulmonary system is compromised and is worried about the virus.  She will come back after ortho consult when she feels ready      Assessment/Plan  PLAN:  Patient will call when ready to return         Timed:    Manual Therapy:    0     mins  20615;  Therapeutic Exercise:    0     mins  30071;     Neuromuscular Frandy:    0    mins  42638;    Therapeutic Activity:     0     mins  17813;     Gait Trainin     mins  01192;     Ultrasound:     0     mins  65579;    Electrical Stimulation:    0     mins  79180 ( );  Iontophoresis    0     mins 05425;  Aquatic Therapy    0     mins 21383;  Dry Needling              0     mins    Untimed:  Electrical Stimulation:    0     mins  27139 ( );  Mechanical Traction:    0     mins  38844;     Timed Treatment:   0   mins   Total Treatment:     0   mins  Caity Mosquera PT  Physical Therapist

## 2020-03-18 DIAGNOSIS — M05.9 RHEUMATOID ARTHRITIS WITH POSITIVE RHEUMATOID FACTOR, INVOLVING UNSPECIFIED SITE (HCC): Primary | ICD-10-CM

## 2020-04-27 ENCOUNTER — OFFICE VISIT (OUTPATIENT)
Dept: FAMILY MEDICINE CLINIC | Facility: CLINIC | Age: 85
End: 2020-04-27

## 2020-04-27 DIAGNOSIS — G89.29 CHRONIC SHOULDER PAIN, UNSPECIFIED LATERALITY: ICD-10-CM

## 2020-04-27 DIAGNOSIS — E03.9 ACQUIRED HYPOTHYROIDISM: ICD-10-CM

## 2020-04-27 DIAGNOSIS — J47.9 BRONCHIECTASIS WITHOUT COMPLICATION (HCC): Primary | ICD-10-CM

## 2020-04-27 DIAGNOSIS — M25.519 CHRONIC SHOULDER PAIN, UNSPECIFIED LATERALITY: ICD-10-CM

## 2020-04-27 PROCEDURE — 99443 PR PHYS/QHP TELEPHONE EVALUATION 21-30 MIN: CPT | Performed by: INTERNAL MEDICINE

## 2020-04-27 RX ORDER — ZINC GLUCONATE 13.3 MG
LOZENGE ORAL
COMMUNITY
End: 2021-04-19

## 2020-04-27 NOTE — PROGRESS NOTES
Subjective follow up on lungs  Emerita Bazan is a 84 y.o. female. This visit has been rescheduled as a phone visit to comply with patient safety concerns in accordance with CDC recommendations. Total time of discussion was 28 minutes.        History of Present Illness patient is following up on her lung condition.  She basically seems to be doing fairly well.  The additional strength on the Breo seems to be helping.  She does have good and bad days.  She has been trying to stay indoors as much as possible and she does wear a mask when she is out in public.  The mask does sometimes make her feel short of breath.  Since her last visit she did have a shot in her shoulder.  She has better range of motion and can now move her arm fairly well.  She can reach around and undo her bra all in the back.  She was not able to do physical therapy.  She did see the rheumatologist.  We did discuss the Vectra testing and what it implies.  She is not sure she wants to take medication.  We did arrive on a decision to repeat this in about 6 months.  We did discuss her sputum specimens that we need to do coming up in May and I have placed an order for that.  We also discussed a hearing test that she had in June 2019.  She would like to get this repeated but has to wait a year.    The following portions of the patient's history were reviewed and updated as appropriate: allergies, current medications, past family history, past medical history, past social history, past surgical history and problem list.    Review of Systems   Constitutional: Positive for fatigue. Negative for chills and fever.   Respiratory: Positive for shortness of breath. Negative for chest tightness.    Musculoskeletal: Negative for arthralgias.       Objective   Physical Exam   Constitutional: She is oriented to person, place, and time.   Pulmonary/Chest:   Respirations were not labored on the phone   Neurological: She is alert and oriented to person, place, and  time.   Psychiatric: She has a normal mood and affect.   Nursing note reviewed.        Assessment/Plan   Emerita was seen today for follow-up.    Diagnoses and all orders for this visit:    Bronchiectasis without complication (CMS/HCC)  -     AFB Culture - Sputum, Lung; Future    Chronic shoulder pain, unspecified laterality    Acquired hypothyroidism      Emerita is following up on her bronchiectasis.  We did discuss her findings at the [place] clinic and their recommendations on the sputum specimen.  We did discuss her recent visits with her orthopedist and rheumatologist.  All in all we spent 28 minutes discussing various issues.

## 2020-05-20 ENCOUNTER — RESULTS ENCOUNTER (OUTPATIENT)
Dept: FAMILY MEDICINE CLINIC | Facility: CLINIC | Age: 85
End: 2020-05-20

## 2020-05-20 DIAGNOSIS — J47.9 BRONCHIECTASIS WITHOUT COMPLICATION (HCC): ICD-10-CM

## 2020-05-29 DIAGNOSIS — J47.9 BRONCHIECTASIS WITHOUT COMPLICATION (HCC): Primary | ICD-10-CM

## 2020-06-10 RX ORDER — LEVOTHYROXINE SODIUM 0.1 MG/1
TABLET ORAL
Qty: 90 TABLET | Refills: 1 | Status: SHIPPED | OUTPATIENT
Start: 2020-06-10 | End: 2020-12-15

## 2020-06-11 DIAGNOSIS — J47.9 BRONCHIECTASIS WITHOUT COMPLICATION (HCC): Primary | ICD-10-CM

## 2020-06-11 RX ORDER — SODIUM CHLORIDE FOR INHALATION 7 %
4 VIAL, NEBULIZER (ML) INHALATION ONCE AS NEEDED
Status: CANCELLED | OUTPATIENT
Start: 2020-06-11

## 2020-06-11 RX ORDER — ALBUTEROL SULFATE 2.5 MG/3ML
2.5 SOLUTION RESPIRATORY (INHALATION) ONCE AS NEEDED
Status: CANCELLED | OUTPATIENT
Start: 2020-06-11

## 2020-06-16 ENCOUNTER — LAB (OUTPATIENT)
Dept: LAB | Facility: HOSPITAL | Age: 85
End: 2020-06-16

## 2020-06-16 DIAGNOSIS — J47.9 BRONCHIECTASIS WITHOUT COMPLICATION (HCC): ICD-10-CM

## 2020-06-16 PROCEDURE — 87206 SMEAR FLUORESCENT/ACID STAI: CPT

## 2020-06-16 PROCEDURE — 87116 MYCOBACTERIA CULTURE: CPT

## 2020-06-22 ENCOUNTER — OFFICE VISIT (OUTPATIENT)
Dept: FAMILY MEDICINE CLINIC | Facility: CLINIC | Age: 85
End: 2020-06-22

## 2020-06-22 VITALS
HEIGHT: 59 IN | BODY MASS INDEX: 25.47 KG/M2 | OXYGEN SATURATION: 98 % | DIASTOLIC BLOOD PRESSURE: 70 MMHG | TEMPERATURE: 98 F | SYSTOLIC BLOOD PRESSURE: 120 MMHG | HEART RATE: 82 BPM

## 2020-06-22 DIAGNOSIS — J47.9 BRONCHIECTASIS WITHOUT COMPLICATION (HCC): Primary | ICD-10-CM

## 2020-06-22 DIAGNOSIS — H61.92 SKIN LESION OF LEFT EXTERNAL EAR: ICD-10-CM

## 2020-06-22 DIAGNOSIS — L30.4 INTERTRIGO: ICD-10-CM

## 2020-06-22 DIAGNOSIS — B07.0 PLANTAR WART: ICD-10-CM

## 2020-06-22 PROCEDURE — 99214 OFFICE O/P EST MOD 30 MIN: CPT | Performed by: INTERNAL MEDICINE

## 2020-06-22 RX ORDER — KETOCONAZOLE 20 MG/G
CREAM TOPICAL
COMMUNITY
Start: 2020-04-27 | End: 2021-01-11 | Stop reason: SDUPTHER

## 2020-06-22 NOTE — PROGRESS NOTES
Subjective Chief complaint is checkup on her bronchiectasis  Emerita Bazan is a 84 y.o. female.     History of Present Illness Emerita is here today for checkup on her bronchiectasis.  She seems to be doing fairly well.  She has struggled a little bit with the AeroChamber.  I did see that there may be an actual clog in this that medicine is not coming into the chamber.  I advised her to clean this little more vigorously.  Her sputum for AFB was negative.  We will see her in 4 months and order another sputum specimen.  At that time we will also order some pulmonary function tests and a follow-up CAT scan.  We did advise not to travel by plane with the present COVID-19 pandemic.  We did discuss her intertrigo beneath her breast.  We did discuss that this was likely going to be a recurrent problem.  We did discuss the possibility of using oral medications for more prolonged period but she is not interested in that at the present time.  She does have a wart on the bottom of her foot.  She has been using some topicals on it and I think that is okay.  We did discuss the use of Tylenol.  She has good and bad days in terms of pain.  We advised that she may want to just take the Tylenol more routinely and see if this even things out.  She is complaining of a spot on her left ear that is been present for several weeks.  It seems to want to heal a little bit and then does not.  The following portions of the patient's history were reviewed and updated as appropriate: allergies, current medications, past family history, past medical history, past social history, past surgical history and problem list.    Review of Systems   Respiratory: Positive for cough and shortness of breath. Negative for chest tightness.    Cardiovascular: Negative for chest pain and leg swelling.       Objective   Physical Exam   Cardiovascular: Normal rate and regular rhythm.   Pulmonary/Chest: Effort normal and breath sounds normal.   Musculoskeletal: She  exhibits no edema.   Skin:   There is a plantar wart in the mid arch of the right foot.  There was a hyperkeratotic protuberance in the midfoot on the right.  This actually flicked off.  The left foot looks okay.    There is an irritated somewhat picked at area on her left ear.  It does not have the obvious appearance of a cancer.  I am going to have her use some mupirocin ointment and contact me in 2 weeks.   Nursing note and vitals reviewed.        Assessment/Plan   Emerita was seen today for follow-up.    Diagnoses and all orders for this visit:    Bronchiectasis without complication (CMS/HCC)    Intertrigo    Plantar wart      Emerita is here today for follow-up on her bronchiectasis.  We will see her in October and do some additional test.  She is going to continue to use topicals for the intertrigo and topicals for the plantar wart.

## 2020-07-08 RX ORDER — ALBUTEROL SULFATE 90 UG/1
2 POWDER, METERED RESPIRATORY (INHALATION) EVERY 4 HOURS PRN
Qty: 1 INHALER | Refills: 11 | Status: SHIPPED | OUTPATIENT
Start: 2020-07-08 | End: 2021-02-03 | Stop reason: SDUPTHER

## 2020-07-10 DIAGNOSIS — H61.92 SKIN LESION OF LEFT EXTERNAL EAR: Primary | ICD-10-CM

## 2020-07-27 LAB
ACID FAST STN SPEC: NEGATIVE
BACTERIA SPEC AEROBE CULT: NEGATIVE
SPECIMEN PREPARATION: NORMAL

## 2020-08-18 ENCOUNTER — OFFICE VISIT (OUTPATIENT)
Dept: OBSTETRICS AND GYNECOLOGY | Age: 85
End: 2020-08-18

## 2020-08-18 VITALS
WEIGHT: 124 LBS | SYSTOLIC BLOOD PRESSURE: 126 MMHG | DIASTOLIC BLOOD PRESSURE: 78 MMHG | BODY MASS INDEX: 25 KG/M2 | HEIGHT: 59 IN

## 2020-08-18 DIAGNOSIS — N76.3 SUBACUTE VULVITIS: Primary | ICD-10-CM

## 2020-08-18 PROCEDURE — 99213 OFFICE O/P EST LOW 20 MIN: CPT | Performed by: OBSTETRICS & GYNECOLOGY
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
## 2020-10-22 ENCOUNTER — OFFICE VISIT (OUTPATIENT)
Dept: FAMILY MEDICINE CLINIC | Facility: CLINIC | Age: 85
End: 2020-10-22

## 2020-10-22 VITALS
TEMPERATURE: 97.3 F | HEIGHT: 59 IN | HEART RATE: 81 BPM | SYSTOLIC BLOOD PRESSURE: 110 MMHG | WEIGHT: 122.8 LBS | DIASTOLIC BLOOD PRESSURE: 70 MMHG | BODY MASS INDEX: 24.76 KG/M2 | OXYGEN SATURATION: 98 %

## 2020-10-22 DIAGNOSIS — E78.5 HYPERLIPIDEMIA, UNSPECIFIED HYPERLIPIDEMIA TYPE: ICD-10-CM

## 2020-10-22 DIAGNOSIS — Z00.00 MEDICARE ANNUAL WELLNESS VISIT, INITIAL: Primary | ICD-10-CM

## 2020-10-22 DIAGNOSIS — J47.9 BRONCHIECTASIS WITHOUT COMPLICATION (HCC): ICD-10-CM

## 2020-10-22 DIAGNOSIS — J47.9 BRONCHIECTASIS WITHOUT COMPLICATION (HCC): Primary | ICD-10-CM

## 2020-10-22 DIAGNOSIS — E03.9 ACQUIRED HYPOTHYROIDISM: ICD-10-CM

## 2020-10-22 PROCEDURE — G0008 ADMIN INFLUENZA VIRUS VAC: HCPCS | Performed by: INTERNAL MEDICINE

## 2020-10-22 PROCEDURE — 90694 VACC AIIV4 NO PRSRV 0.5ML IM: CPT | Performed by: INTERNAL MEDICINE

## 2020-10-22 PROCEDURE — G0438 PPPS, INITIAL VISIT: HCPCS | Performed by: INTERNAL MEDICINE

## 2020-10-22 RX ORDER — NEOMYCIN SULFATE, POLYMYXIN B SULFATE AND DEXAMETHASONE 3.5; 10000; 1 MG/ML; [USP'U]/ML; MG/ML
SUSPENSION/ DROPS OPHTHALMIC
COMMUNITY
Start: 2020-09-09

## 2020-10-22 NOTE — PATIENT INSTRUCTIONS
Medicare Wellness  Personal Prevention Plan of Service     Date of Office Visit:  10/22/2020  Encounter Provider:  Humberto Lopez MD  Place of Service:  [facility]  Patient Name: Emerita Bazan  :  1935    As part of the Medicare Wellness portion of your visit today, we are providing you with this personalized preventive plan of services (PPPS). This plan is based upon recommendations of the [place] Preventive Services Task Force (USPSTF) and the Advisory Committee on Immunization Practices (ACIP).    This lists the preventive care services that should be considered, and provides dates of when you are due. Items listed as completed are up-to-date and do not require any further intervention.    Health Maintenance   Topic Date Due   • ANNUAL WELLNESS VISIT  2016   • ZOSTER VACCINE (2 of 3) 2017   • Pneumococcal Vaccine 65+ (2 of 2 - PPSV23) 2018   • INFLUENZA VACCINE  2020   • LIPID PANEL  2021   • DXA SCAN  2021   • MAMMOGRAM  2022   • TDAP/TD VACCINES (2 - Td) 2027       Orders Placed This Encounter   Procedures   • Fluarix Quad >6 Months (5762-4600)   • Comprehensive Metabolic Panel   • Lipid Panel   • TSH+Free T4   • CBC & Differential     Order Specific Question:   Manual Differential     Answer:   No       No follow-ups on file.
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
## 2020-10-23 LAB
ALBUMIN SERPL-MCNC: 4.3 G/DL (ref 3.5–5.2)
ALBUMIN/GLOB SERPL: 2.2 G/DL
ALP SERPL-CCNC: 52 U/L (ref 39–117)
ALT SERPL-CCNC: 18 U/L (ref 1–33)
AST SERPL-CCNC: 22 U/L (ref 1–32)
BASOPHILS # BLD AUTO: 0.07 10*3/MM3 (ref 0–0.2)
BASOPHILS NFR BLD AUTO: 0.8 % (ref 0–1.5)
BILIRUB SERPL-MCNC: 0.5 MG/DL (ref 0–1.2)
BUN SERPL-MCNC: 29 MG/DL (ref 8–23)
BUN/CREAT SERPL: 32.6 (ref 7–25)
CALCIUM SERPL-MCNC: 9.2 MG/DL (ref 8.6–10.5)
CHLORIDE SERPL-SCNC: 105 MMOL/L (ref 98–107)
CHOLEST SERPL-MCNC: 207 MG/DL (ref 0–200)
CO2 SERPL-SCNC: 27.2 MMOL/L (ref 22–29)
CREAT SERPL-MCNC: 0.89 MG/DL (ref 0.57–1)
EOSINOPHIL # BLD AUTO: 0.18 10*3/MM3 (ref 0–0.4)
EOSINOPHIL NFR BLD AUTO: 2 % (ref 0.3–6.2)
ERYTHROCYTE [DISTWIDTH] IN BLOOD BY AUTOMATED COUNT: 12.2 % (ref 12.3–15.4)
GLOBULIN SER CALC-MCNC: 2 GM/DL
GLUCOSE SERPL-MCNC: 95 MG/DL (ref 65–99)
HCT VFR BLD AUTO: 38.9 % (ref 34–46.6)
HDLC SERPL-MCNC: 80 MG/DL (ref 40–60)
HGB BLD-MCNC: 12.9 G/DL (ref 12–15.9)
IMM GRANULOCYTES # BLD AUTO: 0.02 10*3/MM3 (ref 0–0.05)
IMM GRANULOCYTES NFR BLD AUTO: 0.2 % (ref 0–0.5)
LDLC SERPL CALC-MCNC: 112 MG/DL (ref 0–100)
LYMPHOCYTES # BLD AUTO: 1.95 10*3/MM3 (ref 0.7–3.1)
LYMPHOCYTES NFR BLD AUTO: 22.2 % (ref 19.6–45.3)
MCH RBC QN AUTO: 32.6 PG (ref 26.6–33)
MCHC RBC AUTO-ENTMCNC: 33.2 G/DL (ref 31.5–35.7)
MCV RBC AUTO: 98.2 FL (ref 79–97)
MONOCYTES # BLD AUTO: 0.81 10*3/MM3 (ref 0.1–0.9)
MONOCYTES NFR BLD AUTO: 9.2 % (ref 5–12)
NEUTROPHILS # BLD AUTO: 5.77 10*3/MM3 (ref 1.7–7)
NEUTROPHILS NFR BLD AUTO: 65.6 % (ref 42.7–76)
NRBC BLD AUTO-RTO: 0 /100 WBC (ref 0–0.2)
PLATELET # BLD AUTO: 309 10*3/MM3 (ref 140–450)
POTASSIUM SERPL-SCNC: 4.4 MMOL/L (ref 3.5–5.2)
PROT SERPL-MCNC: 6.3 G/DL (ref 6–8.5)
RBC # BLD AUTO: 3.96 10*6/MM3 (ref 3.77–5.28)
SODIUM SERPL-SCNC: 140 MMOL/L (ref 136–145)
T4 FREE SERPL-MCNC: 1.83 NG/DL (ref 0.93–1.7)
TRIGL SERPL-MCNC: 84 MG/DL (ref 0–150)
TSH SERPL DL<=0.005 MIU/L-ACNC: 0.11 UIU/ML (ref 0.27–4.2)
VLDLC SERPL CALC-MCNC: 15 MG/DL (ref 5–40)
WBC # BLD AUTO: 8.8 10*3/MM3 (ref 3.4–10.8)

## 2020-10-28 ENCOUNTER — TRANSCRIBE ORDERS (OUTPATIENT)
Dept: ADMINISTRATIVE | Facility: HOSPITAL | Age: 85
End: 2020-10-28

## 2020-10-28 DIAGNOSIS — Z01.818 OTHER SPECIFIED PRE-OPERATIVE EXAMINATION: Primary | ICD-10-CM

## 2020-10-31 ENCOUNTER — LAB (OUTPATIENT)
Dept: LAB | Facility: HOSPITAL | Age: 85
End: 2020-10-31

## 2020-10-31 DIAGNOSIS — Z01.818 OTHER SPECIFIED PRE-OPERATIVE EXAMINATION: ICD-10-CM

## 2020-10-31 PROCEDURE — U0004 COV-19 TEST NON-CDC HGH THRU: HCPCS | Performed by: INTERNAL MEDICINE

## 2020-10-31 PROCEDURE — C9803 HOPD COVID-19 SPEC COLLECT: HCPCS

## 2020-11-02 LAB — SARS-COV-2 RNA RESP QL NAA+PROBE: NOT DETECTED

## 2020-11-03 ENCOUNTER — HOSPITAL ENCOUNTER (OUTPATIENT)
Dept: RESPIRATORY THERAPY | Facility: HOSPITAL | Age: 85
Discharge: HOME | End: 2020-11-03
Admitting: INTERNAL MEDICINE

## 2020-11-03 VITALS — HEART RATE: 82 BPM | RESPIRATION RATE: 16 BRPM | OXYGEN SATURATION: 96 %

## 2020-11-03 DIAGNOSIS — J47.9 BRONCHIECTASIS WITHOUT COMPLICATION (HCC): ICD-10-CM

## 2020-11-03 PROCEDURE — 94760 N-INVAS EAR/PLS OXIMETRY 1: CPT

## 2020-11-03 PROCEDURE — 94799 UNLISTED PULMONARY SVC/PX: CPT

## 2020-11-03 PROCEDURE — 87116 MYCOBACTERIA CULTURE: CPT | Performed by: INTERNAL MEDICINE

## 2020-11-03 PROCEDURE — 87206 SMEAR FLUORESCENT/ACID STAI: CPT | Performed by: INTERNAL MEDICINE

## 2020-11-03 PROCEDURE — 94640 AIRWAY INHALATION TREATMENT: CPT

## 2020-11-03 RX ORDER — SODIUM CHLORIDE FOR INHALATION 7 %
4 VIAL, NEBULIZER (ML) INHALATION ONCE AS NEEDED
Status: COMPLETED | OUTPATIENT
Start: 2020-11-03 | End: 2020-11-03

## 2020-11-03 RX ORDER — ALBUTEROL SULFATE 2.5 MG/3ML
2.5 SOLUTION RESPIRATORY (INHALATION) ONCE AS NEEDED
Status: COMPLETED | OUTPATIENT
Start: 2020-11-03 | End: 2020-11-03

## 2020-11-03 RX ADMIN — SODIUM CHLORIDE 4 ML: 7 NEBU SOLN,3 % NEBU at 13:29

## 2020-11-03 RX ADMIN — ALBUTEROL SULFATE 2.5 MG: 2.5 SOLUTION RESPIRATORY (INHALATION) at 13:25

## 2020-11-03 NOTE — PROGRESS NOTES
Patient presented for sputum induction.  Following administration of albuterol and 7% saline solution patient was instructed to cough and attempt to obtain a sputum specimen.  Her cough sounded dry and the quality of the specimen is questionable as it appears to be at least mostly saliva but will send to lab for ordered culture.  Thank you

## 2020-11-06 RX ORDER — ESTRADIOL 0.5 MG/1
0.5 TABLET ORAL DAILY
Qty: 90 TABLET | Refills: 0 | Status: SHIPPED | OUTPATIENT
Start: 2020-11-06 | End: 2021-02-01 | Stop reason: SDUPTHER

## 2020-11-06 NOTE — TELEPHONE ENCOUNTER
(Link Pt)     Pt called stating she is needing a refill for her estradiol. Pt would like the refill sent to the pharmacy on file.     Please advise     Pharmacy verified     [phone]

## 2020-12-15 LAB
MYCOBACTERIUM SPEC CULT: NORMAL
NIGHT BLUE STAIN TISS: NORMAL

## 2020-12-15 RX ORDER — LEVOTHYROXINE SODIUM 0.1 MG/1
TABLET ORAL
Qty: 90 TABLET | Refills: 1 | Status: SHIPPED | OUTPATIENT
Start: 2020-12-15 | End: 2021-08-16 | Stop reason: SDUPTHER

## 2021-01-11 RX ORDER — KETOCONAZOLE 20 MG/G
CREAM TOPICAL 2 TIMES DAILY PRN
Qty: 15 G | Refills: 5 | Status: SHIPPED | OUTPATIENT
Start: 2021-01-11 | End: 2021-04-19

## 2021-02-01 ENCOUNTER — TELEPHONE (OUTPATIENT)
Dept: OBSTETRICS AND GYNECOLOGY | Age: 86
End: 2021-02-01

## 2021-02-01 RX ORDER — ESTRADIOL 0.5 MG/1
0.5 TABLET ORAL DAILY
Qty: 90 TABLET | Refills: 0 | Status: SHIPPED | OUTPATIENT
Start: 2021-02-01 | End: 2021-04-12 | Stop reason: SDUPTHER

## 2021-02-03 RX ORDER — ALBUTEROL SULFATE 90 UG/1
2 POWDER, METERED RESPIRATORY (INHALATION) EVERY 4 HOURS PRN
Qty: 1 EACH | Refills: 5 | Status: SHIPPED | OUTPATIENT
Start: 2021-02-03

## 2021-02-22 ENCOUNTER — TELEPHONE (OUTPATIENT)
Dept: OBSTETRICS AND GYNECOLOGY | Age: 86
End: 2021-02-22

## 2021-03-01 RX ORDER — MEDROXYPROGESTERONE ACETATE 2.5 MG/1
2.5 TABLET ORAL DAILY
Qty: 90 TABLET | Refills: 3 | Status: SHIPPED | OUTPATIENT
Start: 2021-03-01 | End: 2021-04-12 | Stop reason: SDUPTHER

## 2021-03-02 DIAGNOSIS — Z23 IMMUNIZATION DUE: ICD-10-CM

## 2021-04-12 ENCOUNTER — OFFICE VISIT (OUTPATIENT)
Dept: OBSTETRICS AND GYNECOLOGY | Age: 86
End: 2021-04-12

## 2021-04-12 VITALS
DIASTOLIC BLOOD PRESSURE: 70 MMHG | HEIGHT: 59 IN | SYSTOLIC BLOOD PRESSURE: 118 MMHG | WEIGHT: 125 LBS | BODY MASS INDEX: 25.2 KG/M2

## 2021-04-12 DIAGNOSIS — Z12.31 ENCOUNTER FOR SCREENING MAMMOGRAM FOR MALIGNANT NEOPLASM OF BREAST: Primary | ICD-10-CM

## 2021-04-12 DIAGNOSIS — N76.3 CHRONIC VULVITIS: ICD-10-CM

## 2021-04-12 DIAGNOSIS — Z92.29 HISTORY OF POSTMENOPAUSAL HRT: ICD-10-CM

## 2021-04-12 DIAGNOSIS — Z01.419 ENCOUNTER FOR GYNECOLOGICAL EXAMINATION: ICD-10-CM

## 2021-04-12 DIAGNOSIS — Z00.00 ENCOUNTER FOR ANNUAL PHYSICAL EXAM: ICD-10-CM

## 2021-04-12 PROCEDURE — G0101 CA SCREEN;PELVIC/BREAST EXAM: HCPCS | Performed by: OBSTETRICS & GYNECOLOGY

## 2021-04-12 RX ORDER — ESTRADIOL 0.5 MG/1
0.5 TABLET ORAL DAILY
Qty: 90 TABLET | Refills: 3 | Status: SHIPPED | OUTPATIENT
Start: 2021-04-12 | End: 2022-04-18 | Stop reason: SDUPTHER

## 2021-04-12 RX ORDER — MEDROXYPROGESTERONE ACETATE 2.5 MG/1
2.5 TABLET ORAL DAILY
Qty: 90 TABLET | Refills: 3 | Status: SHIPPED | OUTPATIENT
Start: 2021-04-12 | End: 2022-04-18 | Stop reason: SDUPTHER
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
## 2021-04-14 LAB
CYTOLOGIST CVX/VAG CYTO: NORMAL
CYTOLOGY CVX/VAG DOC CYTO: NORMAL
CYTOLOGY CVX/VAG DOC THIN PREP: NORMAL
DX ICD CODE: NORMAL
HIV 1 & 2 AB SER-IMP: NORMAL
HPV I/H RISK 4 DNA CVX QL PROBE+SIG AMP: NEGATIVE
OTHER STN SPEC: NORMAL
STAT OF ADQ CVX/VAG CYTO-IMP: NORMAL

## 2021-04-19 ENCOUNTER — OFFICE VISIT (OUTPATIENT)
Dept: FAMILY MEDICINE CLINIC | Facility: CLINIC | Age: 86
End: 2021-04-19

## 2021-04-19 VITALS
RESPIRATION RATE: 18 BRPM | TEMPERATURE: 97.5 F | OXYGEN SATURATION: 97 % | BODY MASS INDEX: 25.2 KG/M2 | DIASTOLIC BLOOD PRESSURE: 60 MMHG | HEART RATE: 74 BPM | SYSTOLIC BLOOD PRESSURE: 102 MMHG | WEIGHT: 125 LBS | HEIGHT: 59 IN

## 2021-04-19 DIAGNOSIS — K21.9 GASTROESOPHAGEAL REFLUX DISEASE WITHOUT ESOPHAGITIS: ICD-10-CM

## 2021-04-19 DIAGNOSIS — R06.7 SNEEZING: ICD-10-CM

## 2021-04-19 DIAGNOSIS — J47.9 BRONCHIECTASIS WITHOUT COMPLICATION (HCC): ICD-10-CM

## 2021-04-19 DIAGNOSIS — E78.5 HYPERLIPIDEMIA, UNSPECIFIED HYPERLIPIDEMIA TYPE: ICD-10-CM

## 2021-04-19 DIAGNOSIS — E03.9 ACQUIRED HYPOTHYROIDISM: Primary | ICD-10-CM

## 2021-04-19 PROCEDURE — 99214 OFFICE O/P EST MOD 30 MIN: CPT | Performed by: INTERNAL MEDICINE

## 2021-04-19 NOTE — PROGRESS NOTES
"Chief Complaint  Hypothyroidism and Follow-up    Subjective          Emerita Bazan presents to [facility]  History of Present Illness chief complaint is follow-up on thyroid.  Emerita is here today for follow-up.  In terms of her thyroid her last labs look like she was a little bit overmedicated.  We did make some adjustment in her dosing.  She is now taking medicine on alternating days.  Her weight seems to be holding fairly stable.  She does have chronic obstructive pulmonary disease with bronchiectasis.  She is using her inhalers and seems to be doing okay with that.  She was evaluated previously at a lung [place] in [place].  They recommended doing sputum's for AFB several times per year.  She has had negative sputum specimens and I think we will make this when our last one.  She does have a prior history of rheumatoid arthritis but her recent laboratories at the rheumatologist showed no significant problems.  She is experiencing some reflux.  She has some omeprazole that she can take.  She would like to be evaluated by different gastroenterologist.  She also wants to be referred to an allergist.  Objective   Vital Signs:   /60 (BP Location: Right arm)   Pulse 74   Temp 97.5 °F (36.4 °C)   Resp 18   Ht 148.6 cm (58.5\")   Wt 56.7 kg (125 lb)   SpO2 97%   BMI 25.68 kg/m²     Physical Exam   Result Review :   The following data was reviewed by: Humberto Lopez MD on 04/19/2021:  TSH    TSH 10/22/20   TSH 0.107 (A)   (A) Abnormal value            Data reviewed: Radiologic studies ct chest          Assessment and Plan    Diagnoses and all orders for this visit:    1. Acquired hypothyroidism (Primary)  -     TSH+Free T4  -     T3, Free    2. Gastroesophageal reflux disease without esophagitis  -     Ambulatory Referral to Gastroenterology    3. Bronchiectasis without complication (CMS/[facility])  -     Sputum Induction; Future  -     AFB Culture - Sputum, Lung; " Future    4. Sneezing  -     Ambulatory Referral to Allergy    5. Hyperlipidemia, unspecified hyperlipidemia type  -     Comprehensive Metabolic Panel  -     Lipid Panel      I spent 31 minutes caring for Emerita on this date of service. This time includes time spent by me in the following activities:preparing for the visit, reviewing tests, obtaining and/or reviewing a separately obtained history, performing a medically appropriate examination and/or evaluation , counseling and educating the patient/family/caregiver, ordering medications, tests, or procedures, documenting information in the medical record and independently interpreting results and communicating that information with the patient/family/caregiver  Follow Up   No follow-ups on file.  Patient was given instructions and counseling regarding her condition or for health maintenance advice. Please see specific information pulled into the AVS if appropriate.     Emerita is here today for follow-up on her thyroid.  We are going to check labs for that.  We are going to have her use the omeprazole that she has at home and see if that gets her any relief from her stomach symptoms but I am going to refer her to the gastroenterologist.  We will make this the last sputum induction specimen.  If this is negative I do not see any reason to continue.  Answers for HPI/ROS submitted by the patient on 4/17/2021  What is the primary reason for your visit?: Abdominal Pain

## 2021-04-20 LAB
ALBUMIN SERPL-MCNC: 4.1 G/DL (ref 3.5–5.2)
ALBUMIN/GLOB SERPL: 2 G/DL
ALP SERPL-CCNC: 56 U/L (ref 39–117)
ALT SERPL-CCNC: 14 U/L (ref 1–33)
AST SERPL-CCNC: 17 U/L (ref 1–32)
BILIRUB SERPL-MCNC: 0.3 MG/DL (ref 0–1.2)
BUN SERPL-MCNC: 19 MG/DL (ref 8–23)
BUN/CREAT SERPL: 25 (ref 7–25)
CALCIUM SERPL-MCNC: 9.4 MG/DL (ref 8.6–10.5)
CHLORIDE SERPL-SCNC: 105 MMOL/L (ref 98–107)
CHOLEST SERPL-MCNC: 188 MG/DL (ref 0–200)
CO2 SERPL-SCNC: 22.1 MMOL/L (ref 22–29)
CREAT SERPL-MCNC: 0.76 MG/DL (ref 0.57–1)
GLOBULIN SER CALC-MCNC: 2.1 GM/DL
GLUCOSE SERPL-MCNC: 91 MG/DL (ref 65–99)
HDLC SERPL-MCNC: 56 MG/DL (ref 40–60)
LDLC SERPL CALC-MCNC: 100 MG/DL (ref 0–100)
POTASSIUM SERPL-SCNC: 4 MMOL/L (ref 3.5–5.2)
PROT SERPL-MCNC: 6.2 G/DL (ref 6–8.5)
SODIUM SERPL-SCNC: 142 MMOL/L (ref 136–145)
T3FREE SERPL-MCNC: 2.3 PG/ML (ref 2–4.4)
T4 FREE SERPL-MCNC: 1.88 NG/DL (ref 0.93–1.7)
TRIGL SERPL-MCNC: 186 MG/DL (ref 0–150)
TSH SERPL DL<=0.005 MIU/L-ACNC: 0.24 UIU/ML (ref 0.27–4.2)
VLDLC SERPL CALC-MCNC: 32 MG/DL (ref 5–40)

## 2021-06-03 ENCOUNTER — TRANSCRIBE ORDERS (OUTPATIENT)
Dept: SLEEP MEDICINE | Facility: HOSPITAL | Age: 86
End: 2021-06-03

## 2021-06-03 DIAGNOSIS — Z01.818 OTHER SPECIFIED PRE-OPERATIVE EXAMINATION: Primary | ICD-10-CM

## 2021-06-04 ENCOUNTER — APPOINTMENT (OUTPATIENT)
Dept: LAB | Facility: HOSPITAL | Age: 86
End: 2021-06-04

## 2021-06-04 ENCOUNTER — TRANSCRIBE ORDERS (OUTPATIENT)
Dept: ADMINISTRATIVE | Facility: HOSPITAL | Age: 86
End: 2021-06-04

## 2021-06-04 ENCOUNTER — LAB (OUTPATIENT)
Dept: LAB | Facility: HOSPITAL | Age: 86
End: 2021-06-04

## 2021-06-04 DIAGNOSIS — Z01.818 OTHER SPECIFIED PRE-OPERATIVE EXAMINATION: Primary | ICD-10-CM

## 2021-06-04 DIAGNOSIS — Z01.818 OTHER SPECIFIED PRE-OPERATIVE EXAMINATION: ICD-10-CM

## 2021-06-04 PROCEDURE — C9803 HOPD COVID-19 SPEC COLLECT: HCPCS

## 2021-06-04 PROCEDURE — U0005 INFEC AGEN DETEC AMPLI PROBE: HCPCS

## 2021-06-04 PROCEDURE — U0004 COV-19 TEST NON-CDC HGH THRU: HCPCS

## 2021-06-05 LAB — SARS-COV-2 ORF1AB RESP QL NAA+PROBE: NOT DETECTED

## 2021-06-07 ENCOUNTER — HOSPITAL ENCOUNTER (OUTPATIENT)
Dept: RESPIRATORY THERAPY | Facility: HOSPITAL | Age: 86
Discharge: HOME | End: 2021-06-07

## 2021-06-07 ENCOUNTER — APPOINTMENT (OUTPATIENT)
Dept: RESPIRATORY THERAPY | Facility: HOSPITAL | Age: 86
End: 2021-06-07

## 2021-06-07 ENCOUNTER — LAB (OUTPATIENT)
Dept: LAB | Facility: HOSPITAL | Age: 86
End: 2021-06-07

## 2021-06-07 VITALS — OXYGEN SATURATION: 99 % | HEART RATE: 78 BPM | RESPIRATION RATE: 20 BRPM

## 2021-06-07 DIAGNOSIS — J47.9 BRONCHIECTASIS WITHOUT COMPLICATION (HCC): ICD-10-CM

## 2021-06-07 PROCEDURE — 87116 MYCOBACTERIA CULTURE: CPT | Performed by: INTERNAL MEDICINE

## 2021-06-07 PROCEDURE — 87206 SMEAR FLUORESCENT/ACID STAI: CPT | Performed by: INTERNAL MEDICINE

## 2021-06-07 PROCEDURE — 94640 AIRWAY INHALATION TREATMENT: CPT

## 2021-06-07 PROCEDURE — 94799 UNLISTED PULMONARY SVC/PX: CPT

## 2021-06-07 RX ORDER — SODIUM CHLORIDE FOR INHALATION 7 %
4 VIAL, NEBULIZER (ML) INHALATION ONCE AS NEEDED
Status: COMPLETED | OUTPATIENT
Start: 2021-06-07 | End: 2021-06-07

## 2021-06-07 RX ORDER — ALBUTEROL SULFATE 2.5 MG/3ML
2.5 SOLUTION RESPIRATORY (INHALATION) ONCE AS NEEDED
Status: COMPLETED | OUTPATIENT
Start: 2021-06-07 | End: 2021-06-07

## 2021-06-07 RX ADMIN — ALBUTEROL SULFATE 2.5 MG: 2.5 SOLUTION RESPIRATORY (INHALATION) at 13:22

## 2021-06-07 RX ADMIN — SODIUM CHLORIDE 4 ML: 7 NEBU SOLN,3 % NEBU at 13:28

## 2021-06-23 RX ORDER — FLUTICASONE PROPIONATE 50 MCG
2 SPRAY, SUSPENSION (ML) NASAL DAILY
Qty: 16 G | Refills: 5 | Status: SHIPPED | OUTPATIENT
Start: 2021-06-23 | End: 2021-08-16 | Stop reason: SDUPTHER

## 2021-06-24 ENCOUNTER — TELEPHONE (OUTPATIENT)
Dept: FAMILY MEDICINE CLINIC | Facility: CLINIC | Age: 86
End: 2021-06-24

## 2021-06-24 NOTE — TELEPHONE ENCOUNTER
Dr. Lopez is wanting to know why [facility] has not contacted patient.  All I see is patient to schedule and she states that nobody has contact her to schedule an appointment and this has been since April.  Called the office they don't have anything on this patient.  There fax number is [phone]

## 2021-07-22 ENCOUNTER — HOSPITAL ENCOUNTER (OUTPATIENT)
Dept: MAMMOGRAPHY | Facility: HOSPITAL | Age: 86
Discharge: HOME | End: 2021-07-22
Admitting: OBSTETRICS & GYNECOLOGY

## 2021-07-22 DIAGNOSIS — Z12.31 ENCOUNTER FOR SCREENING MAMMOGRAM FOR MALIGNANT NEOPLASM OF BREAST: ICD-10-CM

## 2021-07-22 PROCEDURE — 77063 BREAST TOMOSYNTHESIS BI: CPT

## 2021-07-22 PROCEDURE — 77067 SCR MAMMO BI INCL CAD: CPT

## 2021-08-09 DIAGNOSIS — E03.9 ACQUIRED HYPOTHYROIDISM: Primary | ICD-10-CM

## 2021-08-11 ENCOUNTER — OFFICE (OUTPATIENT)
Dept: URBAN - METROPOLITAN AREA CLINIC 75 | Facility: CLINIC | Age: 86
End: 2021-08-11

## 2021-08-11 VITALS
HEIGHT: 64 IN | OXYGEN SATURATION: 94 % | HEART RATE: 74 BPM | RESPIRATION RATE: 16 BRPM | DIASTOLIC BLOOD PRESSURE: 74 MMHG | SYSTOLIC BLOOD PRESSURE: 102 MMHG | WEIGHT: 125.6 LBS

## 2021-08-11 DIAGNOSIS — K59.00 CONSTIPATION, UNSPECIFIED: ICD-10-CM

## 2021-08-11 DIAGNOSIS — R10.30 LOWER ABDOMINAL PAIN, UNSPECIFIED: ICD-10-CM

## 2021-08-11 DIAGNOSIS — K21.9 GASTRO-ESOPHAGEAL REFLUX DISEASE WITHOUT ESOPHAGITIS: ICD-10-CM

## 2021-08-11 PROCEDURE — 99204 OFFICE O/P NEW MOD 45 MIN: CPT | Performed by: INTERNAL MEDICINE

## 2021-08-16 RX ORDER — LEVOTHYROXINE SODIUM 0.07 MG/1
75 TABLET ORAL DAILY
Qty: 90 TABLET | Refills: 1 | Status: SHIPPED | OUTPATIENT
Start: 2021-08-16 | End: 2022-01-03 | Stop reason: SDUPTHER

## 2021-08-16 RX ORDER — FLUTICASONE PROPIONATE 50 MCG
2 SPRAY, SUSPENSION (ML) NASAL DAILY
Qty: 48 G | Refills: 3 | Status: SHIPPED | OUTPATIENT
Start: 2021-08-16

## 2021-09-29 VITALS
SYSTOLIC BLOOD PRESSURE: 161 MMHG | DIASTOLIC BLOOD PRESSURE: 76 MMHG | HEART RATE: 75 BPM | DIASTOLIC BLOOD PRESSURE: 71 MMHG | RESPIRATION RATE: 12 BRPM | SYSTOLIC BLOOD PRESSURE: 162 MMHG | RESPIRATION RATE: 18 BRPM | DIASTOLIC BLOOD PRESSURE: 88 MMHG | RESPIRATION RATE: 15 BRPM | SYSTOLIC BLOOD PRESSURE: 164 MMHG | RESPIRATION RATE: 20 BRPM | OXYGEN SATURATION: 98 % | HEART RATE: 76 BPM | SYSTOLIC BLOOD PRESSURE: 172 MMHG | DIASTOLIC BLOOD PRESSURE: 74 MMHG | DIASTOLIC BLOOD PRESSURE: 84 MMHG | SYSTOLIC BLOOD PRESSURE: 131 MMHG | OXYGEN SATURATION: 95 % | HEART RATE: 73 BPM | TEMPERATURE: 97.8 F | SYSTOLIC BLOOD PRESSURE: 146 MMHG | RESPIRATION RATE: 17 BRPM | DIASTOLIC BLOOD PRESSURE: 83 MMHG | SYSTOLIC BLOOD PRESSURE: 120 MMHG | HEART RATE: 82 BPM | HEART RATE: 80 BPM | RESPIRATION RATE: 16 BRPM | SYSTOLIC BLOOD PRESSURE: 133 MMHG | SYSTOLIC BLOOD PRESSURE: 139 MMHG | HEART RATE: 74 BPM | OXYGEN SATURATION: 97 % | HEIGHT: 59 IN | WEIGHT: 125 LBS | DIASTOLIC BLOOD PRESSURE: 65 MMHG | TEMPERATURE: 97.4 F | RESPIRATION RATE: 19 BRPM | DIASTOLIC BLOOD PRESSURE: 100 MMHG | SYSTOLIC BLOOD PRESSURE: 157 MMHG | OXYGEN SATURATION: 100 % | SYSTOLIC BLOOD PRESSURE: 143 MMHG | DIASTOLIC BLOOD PRESSURE: 86 MMHG | HEART RATE: 72 BPM | RESPIRATION RATE: 13 BRPM | HEART RATE: 83 BPM | OXYGEN SATURATION: 99 % | DIASTOLIC BLOOD PRESSURE: 69 MMHG | HEART RATE: 71 BPM

## 2021-10-01 ENCOUNTER — AMBULATORY SURGICAL CENTER (OUTPATIENT)
Dept: URBAN - METROPOLITAN AREA SURGERY 17 | Facility: SURGERY | Age: 86
End: 2021-10-01

## 2021-10-01 DIAGNOSIS — K57.30 DIVERTICULOSIS OF LARGE INTESTINE WITHOUT PERFORATION OR ABS: ICD-10-CM

## 2021-10-01 DIAGNOSIS — R10.30 LOWER ABDOMINAL PAIN, UNSPECIFIED: ICD-10-CM

## 2021-10-01 DIAGNOSIS — K59.00 CONSTIPATION, UNSPECIFIED: ICD-10-CM

## 2021-10-01 DIAGNOSIS — K21.9 GASTRO-ESOPHAGEAL REFLUX DISEASE WITHOUT ESOPHAGITIS: ICD-10-CM

## 2021-10-01 PROCEDURE — 45378 DIAGNOSTIC COLONOSCOPY: CPT | Performed by: INTERNAL MEDICINE

## 2021-10-01 PROCEDURE — 43235 EGD DIAGNOSTIC BRUSH WASH: CPT | Performed by: INTERNAL MEDICINE

## 2022-01-03 ENCOUNTER — OFFICE VISIT (OUTPATIENT)
Dept: FAMILY MEDICINE CLINIC | Facility: CLINIC | Age: 87
End: 2022-01-03

## 2022-01-03 VITALS
BODY MASS INDEX: 25.8 KG/M2 | DIASTOLIC BLOOD PRESSURE: 60 MMHG | HEIGHT: 59 IN | WEIGHT: 128 LBS | OXYGEN SATURATION: 98 % | HEART RATE: 74 BPM | SYSTOLIC BLOOD PRESSURE: 100 MMHG

## 2022-01-03 DIAGNOSIS — E78.5 HYPERLIPIDEMIA, UNSPECIFIED HYPERLIPIDEMIA TYPE: ICD-10-CM

## 2022-01-03 DIAGNOSIS — E03.9 ACQUIRED HYPOTHYROIDISM: ICD-10-CM
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
## 2022-01-03 DIAGNOSIS — R26.89 BALANCE PROBLEM: ICD-10-CM

## 2022-01-03 DIAGNOSIS — Z00.00 WELLNESS EXAMINATION: Primary | ICD-10-CM

## 2022-01-03 PROCEDURE — 1170F FXNL STATUS ASSESSED: CPT | Performed by: INTERNAL MEDICINE

## 2022-01-03 PROCEDURE — G0439 PPPS, SUBSEQ VISIT: HCPCS | Performed by: INTERNAL MEDICINE

## 2022-01-03 PROCEDURE — 1159F MED LIST DOCD IN RCRD: CPT | Performed by: INTERNAL MEDICINE

## 2022-01-03 RX ORDER — LEVOTHYROXINE SODIUM 0.07 MG/1
75 TABLET ORAL DAILY
Qty: 90 TABLET | Refills: 1 | Status: SHIPPED | OUTPATIENT
Start: 2022-01-03 | End: 2022-09-01 | Stop reason: SDUPTHER

## 2022-01-03 NOTE — PATIENT INSTRUCTIONS
"Healthy Eating  Following a healthy eating pattern may help you to achieve and maintain a healthy body weight, reduce the risk of chronic disease, and live a long and productive life. It is important to follow a healthy eating pattern at an appropriate calorie level for your body. Your nutritional needs should be met primarily through food by choosing a variety of nutrient-rich foods.  What are tips for following this plan?  Reading food labels  · Read labels and choose the following:  ? Reduced or low sodium.  ? Juices with 100% fruit juice.  ? Foods with low saturated fats and high polyunsaturated and monounsaturated fats.  ? Foods with whole grains, such as whole wheat, cracked wheat, brown rice, and wild rice.  ? Whole grains that are fortified with folic acid. This is recommended for women who are pregnant or who want to become pregnant.  · Read labels and avoid the following:  ? Foods with a lot of added sugars. These include foods that contain brown sugar, corn sweetener, corn syrup, dextrose, fructose, glucose, high-fructose corn syrup, honey, invert sugar, lactose, malt syrup, maltose, molasses, raw sugar, sucrose, trehalose, or turbinado sugar.  § Do not eat more than the following amounts of added sugar per day:  § 6 teaspoons (25 g) for women.  § 9 teaspoons (38 g) for men.  ? Foods that contain processed or refined starches and grains.  ? Refined grain products, such as white flour, degermed cornmeal, white bread, and white rice.  Shopping  · Choose nutrient-rich snacks, such as vegetables, whole fruits, and nuts. Avoid high-calorie and high-sugar snacks, such as potato chips, fruit snacks, and candy.  · Use oil-based dressings and spreads on foods instead of solid fats such as butter, stick margarine, or cream cheese.  · Limit pre-made sauces, mixes, and \"instant\" products such as flavored rice, instant noodles, and ready-made pasta.  · Try more plant-protein sources, such as tofu, tempeh, black beans, " edamame, lentils, nuts, and seeds.  · Explore eating plans such as the Mediterranean diet or vegetarian diet.  Cooking  · Use oil to sauté or stir-juarez foods instead of solid fats such as butter, stick margarine, or lard.  · Try baking, boiling, grilling, or broiling instead of frying.  · Remove the fatty part of meats before cooking.  · Steam vegetables in water or broth.  Meal planning    · At meals, imagine dividing your plate into fourths:  ? One-half of your plate is fruits and vegetables.  ? One-fourth of your plate is whole grains.  ? One-fourth of your plate is protein, especially lean meats, poultry, eggs, tofu, beans, or nuts.  · Include low-fat dairy as part of your daily diet.    Lifestyle  · Choose healthy options in all settings, including home, work, school, restaurants, or stores.  · Prepare your food safely:  ? Wash your hands after handling raw meats.  ? Keep food preparation surfaces clean by regularly washing with hot, soapy water.  ? Keep raw meats separate from ready-to-eat foods, such as fruits and vegetables.  ? , meat, poultry, and eggs to the recommended internal temperature.  ? Store foods at safe temperatures. In general:  § Keep cold foods at 40°F (4.4°C) or below.  § Keep hot foods at 140°F (60°C) or above.  § Keep your freezer at 0°F (-17.8°C) or below.  § Foods are no longer safe to eat when they have been between the temperatures of 40°-140°F (4.4-60°C) for more than 2 hours.  What foods should I eat?  Fruits  Aim to eat 2 cup-equivalents of fresh, canned (in natural juice), or frozen fruits each day. Examples of 1 cup-equivalent of fruit include 1 small apple, 8 large strawberries, 1 cup canned fruit, ½ cup dried fruit, or 1 cup 100% juice.  Vegetables  Aim to eat 2½-3 cup-equivalents of fresh and frozen vegetables each day, including different varieties and colors. Examples of 1 cup-equivalent of vegetables include 2 medium carrots, 2 cups raw, leafy greens, 1 cup  chopped vegetable (raw or cooked), or 1 medium baked potato.  Grains  Aim to eat 6 ounce-equivalents of whole grains each day. Examples of 1 ounce-equivalent of grains include 1 slice of bread, 1 cup ready-to-eat cereal, 3 cups popcorn, or ½ cup cooked rice, pasta, or cereal.  Meats and other proteins  Aim to eat 5-6 ounce-equivalents of protein each day. Examples of 1 ounce-equivalent of protein include 1 egg, 1/2 cup nuts or seeds, or 1 tablespoon (16 g) peanut butter. A cut of meat or fish that is the size of a deck of cards is about 3-4 ounce-equivalents.  · Of the protein you eat each week, try to have at least 8 ounces come from seafood. This includes salmon, trout, herring, and anchovies.  Dairy  Aim to eat 3 cup-equivalents of fat-free or low-fat dairy each day. Examples of 1 cup-equivalent of dairy include 1 cup (240 mL) milk, 8 ounces (250 g) yogurt, 1½ ounces (44 g) natural cheese, or 1 cup (240 mL) fortified soy milk.  Fats and oils  · Aim for about 5 teaspoons (21 g) per day. Choose monounsaturated fats, such as canola and olive oils, avocados, peanut butter, and most nuts, or polyunsaturated fats, such as sunflower, corn, and soybean oils, walnuts, pine nuts, sesame seeds, sunflower seeds, and flaxseed.  Beverages  · Aim for six 8-oz glasses of water per day. Limit coffee to three to five 8-oz cups per day.  · Limit caffeinated beverages that have added calories, such as soda and energy drinks.  · Limit alcohol intake to no more than 1 drink a day for nonpregnant women and 2 drinks a day for men. One drink equals 12 oz of beer (355 mL), 5 oz of wine (148 mL), or 1½ oz of hard liquor (44 mL).  Seasoning and other foods  · Avoid adding excess amounts of salt to your foods. Try flavoring foods with herbs and spices instead of salt.  · Avoid adding sugar to foods.  · Try using oil-based dressings, sauces, and spreads instead of solid fats.  This information is based on general [place] nutrition guidelines.  For more information, visit choosemyplate.gov. Exact amounts may vary based on your nutrition needs.  Summary  · A healthy eating plan may help you to maintain a healthy weight, reduce the risk of chronic diseases, and stay active throughout your life.  · Plan your meals. Make sure you eat the right portions of a variety of nutrient-rich foods.  · Try baking, boiling, grilling, or broiling instead of frying.  · Choose healthy options in all settings, including home, work, school, restaurants, or stores.  This information is not intended to replace advice given to you by your health care provider. Make sure you discuss any questions you have with your health care provider.  Document Revised: 2019 Document Reviewed: 2019  [employer] Patient Education ©  [employer]    Medicare Wellness  Personal Prevention Plan of Service     Date of Office Visit:  2022  Encounter Provider:  Humberto Lopez MD  Place of Service:  [facility]  Patient Name: Emerita Bazan  :  1935    As part of the Medicare Wellness portion of your visit today, we are providing you with this personalized preventive plan of services (PPPS). This plan is based upon recommendations of the [place] Preventive Services Task Force (USPSTF) and the Advisory Committee on Immunization Practices (ACIP).    This lists the preventive care services that should be considered, and provides dates of when you are due. Items listed as completed are up-to-date and do not require any further intervention.    Health Maintenance   Topic Date Due   • Pneumococcal Vaccine 65+ (1 of 2 - PPSV23) 2017   • ZOSTER VACCINE (2 of 3) 2017   • INFLUENZA VACCINE  2021   • ANNUAL WELLNESS VISIT  10/22/2021   • DXA SCAN  2021   • LIPID PANEL  2022   • MAMMOGRAM  2023   • TDAP/TD VACCINES (2 - Td or Tdap) 2027   • COVID-19 Vaccine  Completed       Orders Placed This Encounter    Procedures   • Comprehensive Metabolic Panel     Order Specific Question:   Release to patient     Answer:   Immediate   • Lipid Panel   • TSH+Free T4     Order Specific Question:   Release to patient     Answer:   Immediate   • Ambulatory Referral to Physical Therapy Evaluate and treat, Ortho, Neuro     Referral Priority:   Routine     Referral Type:   Physical Therapy     Referral Reason:   Specialty Services Required     Requested Specialty:   Physical Therapy     Number of Visits Requested:   1   • CBC & Differential     Order Specific Question:   Manual Differential     Answer:   No       No follow-ups on file.

## 2022-01-03 NOTE — PROGRESS NOTES
The ABCs of the Annual Wellness Visit  Subsequent Medicare Wellness Visit    Chief Complaint   Patient presents with   • Medicare Wellness-subsequent   • Fall     Have fallen 3 times in past 6 wks       Subjective    History of Present Illness:  Emerita Bazan is a 86 y.o. female who presents for a Subsequent Medicare Wellness Visit.  Emerita is here today for her annual wellness visit.  She has had some concern since her last visit.  She has had several falls.  Most of these have been trips but she does feel her balance is off.  We did discuss the possibility of physical therapy for this and have given her an order.  She did have a colonoscopy and EGD and those looked okay other than some diverticulosis.  She wants to know about an over-the-counter probiotic and we did discuss these.  We also discussed home testing for Covid.  We did discuss her mental health and she seems to be doing okay although this was a rough year for her.    The following portions of the patient's history were reviewed and   updated as appropriate: allergies, current medications, past family history, past medical history, past social history, past surgical history and problem list.    Compared to one year ago, the patient feels her physical   health is the same.    Compared to one year ago, the patient feels her mental   health is worse.    Recent Hospitalizations:  She was not admitted to the hospital during the last year.       Current Medical Providers:  Patient Care Team:  Humberto Lopez MD as PCP - General (Internal Medicine)  Humberto Roper MD (Inactive) as Obstetrician (Gynecology)  Humberto Lopez MD as Referring Physician (Internal Medicine)  Riki Joyner MD as Consulting Physician (Hematology and Oncology)  Humberto Lopez MD as Referring Physician (Internal Medicine)    Outpatient Medications Prior to Visit   Medication Sig Dispense Refill   • acetaminophen (TYLENOL) 500 MG tablet Take 500 mg  by mouth As Needed for Mild Pain (1-3).     • aspirin 81 MG EC tablet Take 81 mg by mouth Daily.     • B Complex Vitamins (VITAMIN B COMPLEX) tablet Take  by mouth.     • Breo Ellipta 200-25 MCG/INH inhaler Inhale 1 puff Daily. 60 each 5   • Cholecalciferol (VITAMIN D) 1000 UNITS tablet Take  by mouth.     • Collagen Hydrolysate, Bovine, powder Take one tablespoon daily     • estradiol (ESTRACE) 0.5 MG tablet Take 1 tablet by mouth Daily. 90 tablet 3   • Flaxseed, Linseed, (FLAXSEED OIL PO) Take  by mouth.     • levothyroxine (SYNTHROID, LEVOTHROID) 75 MCG tablet Take 1 tablet by mouth Daily. 90 tablet 1   • medroxyPROGESTERone (PROVERA) 2.5 MG tablet Take 1 tablet by mouth Daily. 90 tablet 3   • Multiple Vitamins-Minerals (CENTRUM SILVER PO) Take  by mouth.     • mupirocin (BACTROBAN) 2 % ointment Apply  topically to the appropriate area as directed 3 (Three) Times a Day. 15 g 0   • neomycin-polymyxin-dexamethasone (MAXITROL) 3.5-42298-2.1 ophthalmic suspension      • nystatin-triamcinolone (MYCOLOG II) 103918-9.1 UNIT/GM-% cream Apply  topically to the appropriate area as directed 2 (Two) Times a Day.     • Calcium Carb-Cholecalciferol (CALCIUM 600 + D PO) Take  by mouth.     • chlorhexidine (PERIDEX) 0.12 % solution      • fluticasone (FLONASE) 50 MCG/ACT nasal spray 2 sprays into the nostril(s) as directed by provider Daily. 48 g 3   • ProAir RespiClick 108 (90 Base) MCG/ACT inhaler Inhale 2 puffs Every 4 (Four) Hours As Needed for Wheezing or Shortness of Air. 1 each 5     No facility-administered medications prior to visit.       No opioid medication identified on active medication list. I have reviewed chart for other potential  high risk medication/s and harmful drug interactions in the elderly.          Aspirin is on active medication list. Aspirin use is not indicated based on review of current medical condition/s. Risk of harm outweighs potential benefits. Patient instructed to discontinue this  "medication.  .      Patient Active Problem List   Diagnosis   • Atopic rhinitis   • Bunion   • Gastroesophageal reflux disease   • Hammer toe   • Hyperlipidemia   • Hypothyroidism   • Neuralgia   • Melanocytic nevus   • Seborrheic keratosis   • History of postmenopausal HRT   • Osteopenia of multiple sites   • Closed wedge compression fracture of T5 vertebra with delayed healing   • DDD (degenerative disc disease), thoracic   • Anemia   • Bronchiectasis without complication (HCC)     Advance Care Planning  Advance Directive is on file.  ACP discussion was held with the patient during this visit. Patient has an advance directive in EMR which is still valid.           Objective    Vitals:    22 0850   BP: 100/60   Pulse: 74   SpO2: 98%   Weight: 58.1 kg (128 lb)   Height: 148.6 cm (58.5\")     BMI Readings from Last 1 Encounters:   22 26.29 kg/m²   BMI is above normal parameters. Recommendations include: educational material    Does the patient have evidence of cognitive impairment? No    Physical Exam  Constitutional:       Appearance: She is well-developed.   Neck:      Thyroid: No thyromegaly.      Vascular: No carotid bruit.   Cardiovascular:      Rate and Rhythm: Normal rate and regular rhythm.      Heart sounds: Normal heart sounds. No murmur heard.  No friction rub.   Pulmonary:      Effort: Pulmonary effort is normal. No respiratory distress.      Breath sounds: Normal breath sounds. No wheezing.   Abdominal:      General: Bowel sounds are normal. There is no distension.      Palpations: Abdomen is soft. There is no mass.      Tenderness: There is no abdominal tenderness. There is no guarding.                 HEALTH RISK ASSESSMENT    Smoking Status:  Social History     Tobacco Use   Smoking Status Former Smoker   • Packs/day: 1.00   • Years: 25.00   • Pack years: 25.00   • Types: Cigarettes   • Quit date:    • Years since quittin.0   Smokeless Tobacco Never Used     Alcohol " Consumption:  Social History     Substance and Sexual Activity   Alcohol Use Yes    Comment: Occasional     Fall Risk Screen:    AMADOR Fall Risk Assessment was completed, and patient is at HIGH risk for falls. Assessment completed on:1/3/2022    Depression Screening:  PHQ-2/PHQ-9 Depression Screening 1/3/2022   Little interest or pleasure in doing things 0   Feeling down, depressed, or hopeless 0   Trouble falling or staying asleep, or sleeping too much -   Feeling tired or having little energy -   Poor appetite or overeating -   Feeling bad about yourself - or that you are a failure or have let yourself or your family down -   Trouble concentrating on things, such as reading the newspaper or watching television -   Moving or speaking so slowly that other people could have noticed. Or the opposite - being so fidgety or restless that you have been moving around a lot more than usual -   Thoughts that you would be better off dead, or of hurting yourself in some way -   Total Score 0   If you checked off any problems, how difficult have these problems made it for you to do your work, take care of things at home, or get along with other people? -       Health Habits and Functional and Cognitive Screening:  Functional & Cognitive Status 1/3/2022   Do you have difficulty preparing food and eating? No   Do you have difficulty bathing yourself, getting dressed or grooming yourself? No   Do you have difficulty using the toilet? No   Do you have difficulty moving around from place to place? No   Do you have trouble with steps or getting out of a bed or a chair? Yes   Current Diet Well Balanced Diet   Dental Exam Up to date   Eye Exam Up to date   Exercise (times per week) 5 times per week   Current Exercises Include Walking   Current Exercise Activities Include -   Do you need help using the phone?  No   Are you deaf or do you have serious difficulty hearing?  No   Do you need help with transportation? No   Do you need help  shopping? No   Do you need help preparing meals?  No   Do you need help with housework?  No   Do you need help with laundry? No   Do you need help taking your medications? No   Do you need help managing money? No   Do you ever drive or ride in a car without wearing a seat belt? No   Have you felt unusual stress, anger or loneliness in the last month? -   Who do you live with? -   If you need help, do you have trouble finding someone available to you? -   Have you been bothered in the last four weeks by sexual problems? -   Do you have difficulty concentrating, remembering or making decisions? Yes       Age-appropriate Screening Schedule:  Refer to the list below for future screening recommendations based on patient's age, sex and/or medical conditions. Orders for these recommended tests are listed in the plan section. The patient has been provided with a written plan.    Health Maintenance   Topic Date Due   • ZOSTER VACCINE (2 of 3) 11/12/2017   • INFLUENZA VACCINE  08/01/2021   • DXA SCAN  12/13/2021   • LIPID PANEL  04/19/2022   • MAMMOGRAM  07/22/2023   • TDAP/TD VACCINES (2 - Td or Tdap) 07/18/2027              Assessment/Plan   CMS Preventative Services Quick Reference  Risk Factors Identified During Encounter  Immunizations Discussed/Encouraged (specific Immunizations; Pneumococcal 23 and Shingrix  The above risks/problems have been discussed with the patient.  Follow up actions/plans if indicated are seen below in the Assessment/Plan Section.  Pertinent information has been shared with the patient in the After Visit Summary.    Diagnoses and all orders for this visit:    1. Wellness examination (Primary)    2. Balance problem  -     Ambulatory Referral to Physical Therapy Evaluate and treat, Ortho, Neuro    3. Fall, sequela  -     Ambulatory Referral to Physical Therapy Evaluate and treat, Ortho, Neuro    4. Hyperlipidemia, unspecified hyperlipidemia type  -     Comprehensive Metabolic Panel  -     Lipid  Panel    5. Acquired hypothyroidism  -     CBC & Differential  -     TSH+Free T4    Other orders  -     levothyroxine (SYNTHROID, LEVOTHROID) 75 MCG tablet; Take 1 tablet by mouth Daily.  Dispense: 90 tablet; Refill: 1    Emerita is here today for her wellness visit.  She seems to be doing fairly well.  We are going to get her into some physical therapy.    Follow Up:   No follow-ups on file.     An After Visit Summary and PPPS were made available to the patient.

## 2022-01-04 LAB
ALBUMIN SERPL-MCNC: 4.3 G/DL (ref 3.6–4.6)
ALBUMIN/GLOB SERPL: 1.7 {RATIO} (ref 1.2–2.2)
ALP SERPL-CCNC: 57 IU/L (ref 44–121)
ALT SERPL-CCNC: 13 IU/L (ref 0–32)
AST SERPL-CCNC: 15 IU/L (ref 0–40)
BASOPHILS # BLD AUTO: 0.1 X10E3/UL (ref 0–0.2)
BASOPHILS NFR BLD AUTO: 1 %
BILIRUB SERPL-MCNC: 0.5 MG/DL (ref 0–1.2)
BUN SERPL-MCNC: 16 MG/DL (ref 8–27)
BUN/CREAT SERPL: 16 (ref 12–28)
CALCIUM SERPL-MCNC: 9.7 MG/DL (ref 8.7–10.3)
CHLORIDE SERPL-SCNC: 103 MMOL/L (ref 96–106)
CHOLEST SERPL-MCNC: 244 MG/DL (ref 100–199)
CO2 SERPL-SCNC: 24 MMOL/L (ref 20–29)
CREAT SERPL-MCNC: 0.98 MG/DL (ref 0.57–1)
EOSINOPHIL # BLD AUTO: 0.1 X10E3/UL (ref 0–0.4)
EOSINOPHIL NFR BLD AUTO: 2 %
ERYTHROCYTE [DISTWIDTH] IN BLOOD BY AUTOMATED COUNT: 12.1 % (ref 11.7–15.4)
GLOBULIN SER CALC-MCNC: 2.5 G/DL (ref 1.5–4.5)
GLUCOSE SERPL-MCNC: 104 MG/DL (ref 65–99)
HCT VFR BLD AUTO: 38.8 % (ref 34–46.6)
HDLC SERPL-MCNC: 63 MG/DL
HGB BLD-MCNC: 12.8 G/DL (ref 11.1–15.9)
IMM GRANULOCYTES # BLD AUTO: 0 X10E3/UL (ref 0–0.1)
IMM GRANULOCYTES NFR BLD AUTO: 0 %
LDLC SERPL CALC-MCNC: 151 MG/DL (ref 0–99)
LYMPHOCYTES # BLD AUTO: 1.7 X10E3/UL (ref 0.7–3.1)
LYMPHOCYTES NFR BLD AUTO: 20 %
MCH RBC QN AUTO: 32.8 PG (ref 26.6–33)
MCHC RBC AUTO-ENTMCNC: 33 G/DL (ref 31.5–35.7)
MCV RBC AUTO: 100 FL (ref 79–97)
MONOCYTES # BLD AUTO: 0.8 X10E3/UL (ref 0.1–0.9)
MONOCYTES NFR BLD AUTO: 9 %
NEUTROPHILS # BLD AUTO: 5.7 X10E3/UL (ref 1.4–7)
NEUTROPHILS NFR BLD AUTO: 68 %
PLATELET # BLD AUTO: 330 X10E3/UL (ref 150–450)
POTASSIUM SERPL-SCNC: 4.3 MMOL/L (ref 3.5–5.2)
PROT SERPL-MCNC: 6.8 G/DL (ref 6–8.5)
RBC # BLD AUTO: 3.9 X10E6/UL (ref 3.77–5.28)
SODIUM SERPL-SCNC: 139 MMOL/L (ref 134–144)
T4 FREE SERPL-MCNC: 1.42 NG/DL (ref 0.82–1.77)
TRIGL SERPL-MCNC: 167 MG/DL (ref 0–149)
TSH SERPL DL<=0.005 MIU/L-ACNC: 1.18 UIU/ML (ref 0.45–4.5)
VLDLC SERPL CALC-MCNC: 30 MG/DL (ref 5–40)
WBC # BLD AUTO: 8.4 X10E3/UL (ref 3.4–10.8)

## 2022-04-18 ENCOUNTER — OFFICE VISIT (OUTPATIENT)
Dept: OBSTETRICS AND GYNECOLOGY | Age: 87
End: 2022-04-18

## 2022-04-18 VITALS
WEIGHT: 130 LBS | BODY MASS INDEX: 26.21 KG/M2 | DIASTOLIC BLOOD PRESSURE: 74 MMHG | SYSTOLIC BLOOD PRESSURE: 128 MMHG | HEIGHT: 59 IN

## 2022-04-18 DIAGNOSIS — N95.2 ATROPHIC VAGINITIS: ICD-10-CM

## 2022-04-18 DIAGNOSIS — N76.3 CHRONIC VULVITIS: ICD-10-CM

## 2022-04-18 DIAGNOSIS — M85.89 OSTEOPENIA OF MULTIPLE SITES: Primary | ICD-10-CM

## 2022-04-18 PROCEDURE — 99213 OFFICE O/P EST LOW 20 MIN: CPT | Performed by: OBSTETRICS & GYNECOLOGY

## 2022-04-18 RX ORDER — ESTRADIOL 0.5 MG/1
0.5 TABLET ORAL DAILY
Qty: 90 TABLET | Refills: 3 | Status: SHIPPED | OUTPATIENT
Start: 2022-04-18

## 2022-04-18 RX ORDER — MEDROXYPROGESTERONE ACETATE 2.5 MG/1
2.5 TABLET ORAL DAILY
Qty: 90 TABLET | Refills: 3 | Status: SHIPPED | OUTPATIENT
Start: 2022-04-18
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
## 2022-09-01 ENCOUNTER — OFFICE VISIT (OUTPATIENT)
Dept: FAMILY MEDICINE CLINIC | Facility: CLINIC | Age: 87
End: 2022-09-01

## 2022-09-01 VITALS
HEIGHT: 59 IN | HEART RATE: 73 BPM | DIASTOLIC BLOOD PRESSURE: 64 MMHG | WEIGHT: 126 LBS | SYSTOLIC BLOOD PRESSURE: 104 MMHG | BODY MASS INDEX: 25.4 KG/M2 | OXYGEN SATURATION: 100 %

## 2022-09-01 DIAGNOSIS — E78.5 HYPERLIPIDEMIA, UNSPECIFIED HYPERLIPIDEMIA TYPE: Primary | ICD-10-CM

## 2022-09-01 DIAGNOSIS — E03.9 ACQUIRED HYPOTHYROIDISM: ICD-10-CM

## 2022-09-01 DIAGNOSIS — J47.9 BRONCHIECTASIS WITHOUT COMPLICATION: ICD-10-CM

## 2022-09-01 DIAGNOSIS — M79.18 PIRIFORMIS MUSCLE PAIN: ICD-10-CM

## 2022-09-01 DIAGNOSIS — R20.0 NUMBNESS IN BOTH HANDS: ICD-10-CM

## 2022-09-01 PROCEDURE — 99214 OFFICE O/P EST MOD 30 MIN: CPT | Performed by: INTERNAL MEDICINE

## 2022-09-01 RX ORDER — LEVOTHYROXINE SODIUM 0.07 MG/1
75 TABLET ORAL DAILY
Qty: 90 TABLET | Refills: 3 | Status: SHIPPED | OUTPATIENT
Start: 2022-09-01

## 2022-09-01 NOTE — PROGRESS NOTES
Answers for HPI/ROS submitted by the patient on 8/30/2022  Please describe your symptoms.: No Symptoms.  Just a quarterly checkup.  Have you had these symptoms before?: No  How long have you been having these symptoms?: 1-4 days  Please list any medications you are currently taking for this condition.: I will bring them with me.  Please describe any probable cause for these symptoms. : Allergy season  What is the primary reason for your visit?: Other    Subjective Chief complaint is checkup on thyroid and cholesterol  Emerita Bazan is a 86 y.o. female.     History of Present Illness Emerita is here today for checkup on her thyroid and cholesterol.  She is not on cholesterol medicine and has been watching her diet.  She does take levothyroxine on a daily basis but did run out of it a few days ago.  Since her last visit she did see the dermatologist.  She has had removal of several growths.  She is contemplating some venous treatment for spider veins.  Her dermatologist wants her to see a cardiovascular doctor beforehand.  I am not sure why.  She has been experiencing some back pain.  This is on the lower left side.  Seem to occur when she was visiting [place].  She had traveled on a bus to the theater and then sat for some time and then had to walk several blocks to catch a bus going back home.  The next morning she was experiencing pain.  Using some liniment and heat seems to have helped.  She also disappearance of some numbness in her hands.  The right is worse than the left.  She does not seem to notice it much during the day.  Wearing a splint at night sometimes seems to help.  She does have chronic bronchiectasis but this is fairly stable on her current inhaler regimen.    The following portions of the patient's history were reviewed and updated as appropriate: allergies, current medications, past family history, past medical history, past social history, past surgical history and problem list.    Review of  Systems   Constitutional: Negative for chills and fever.   Respiratory: Negative for chest tightness and shortness of breath.    Cardiovascular: Negative for chest pain.   Musculoskeletal: Positive for back pain.   Neurological: Positive for numbness.       Objective   Physical Exam  Vitals and nursing note reviewed.   Constitutional:       Appearance: Normal appearance.   Cardiovascular:      Rate and Rhythm: Normal rate and regular rhythm.      Pulses: Normal pulses.      Heart sounds: Normal heart sounds.   Pulmonary:      Effort: Pulmonary effort is normal.      Breath sounds: Normal breath sounds.   Abdominal:      General: Bowel sounds are normal.      Palpations: Abdomen is soft.      Tenderness: There is abdominal tenderness.      Comments: She has some mild tenderness in the midepigastrium   Musculoskeletal:      Right lower leg: No edema.      Left lower leg: No edema.      Comments: He has good internal and external rotation of the hips.  She has tenderness to her left piriformis.  The SI joint is okay.  MICHAEL testing is negative   Neurological:      Mental Status: She is alert.           Assessment & Plan   Diagnoses and all orders for this visit:    1. Hyperlipidemia, unspecified hyperlipidemia type (Primary)  -     Comprehensive Metabolic Panel  -     Lipid Panel    2. Acquired hypothyroidism  -     TSH+Free T4    3. Piriformis muscle pain    4. Numbness in both hands  -     Folate  -     Vitamin B12    5. Bronchiectasis without complication (HCC)    Other orders  -     levothyroxine (SYNTHROID, LEVOTHROID) 75 MCG tablet; Take 1 tablet by mouth Daily.  Dispense: 90 tablet; Refill: 3      Emerita is here today for follow-up.  We are going to check on her cholesterol and thyroid.  Did give her some information regarding piriformis syndrome.  For her numbness in the hands we can check a B12 and folic acid level.

## 2022-09-02 LAB
ALBUMIN SERPL-MCNC: 4.2 G/DL (ref 3.5–5.2)
ALBUMIN/GLOB SERPL: 2 G/DL
ALP SERPL-CCNC: 52 U/L (ref 39–117)
ALT SERPL-CCNC: 15 U/L (ref 1–33)
AST SERPL-CCNC: 21 U/L (ref 1–32)
BILIRUB SERPL-MCNC: 0.4 MG/DL (ref 0–1.2)
BUN SERPL-MCNC: 21 MG/DL (ref 8–23)
BUN/CREAT SERPL: 23.6 (ref 7–25)
CALCIUM SERPL-MCNC: 9.5 MG/DL (ref 8.6–10.5)
CHLORIDE SERPL-SCNC: 101 MMOL/L (ref 98–107)
CHOLEST SERPL-MCNC: 212 MG/DL (ref 0–200)
CO2 SERPL-SCNC: 27.5 MMOL/L (ref 22–29)
CREAT SERPL-MCNC: 0.89 MG/DL (ref 0.57–1)
EGFRCR-CYS SERPLBLD CKD-EPI 2021: 64 ML/MIN/1.73
FOLATE SERPL-MCNC: >20 NG/ML (ref 4.78–24.2)
GLOBULIN SER CALC-MCNC: 2.1 GM/DL
GLUCOSE SERPL-MCNC: 89 MG/DL (ref 65–99)
HDLC SERPL-MCNC: 59 MG/DL (ref 40–60)
LDLC SERPL CALC-MCNC: 132 MG/DL (ref 0–100)
POTASSIUM SERPL-SCNC: 4.2 MMOL/L (ref 3.5–5.2)
PROT SERPL-MCNC: 6.3 G/DL (ref 6–8.5)
SODIUM SERPL-SCNC: 138 MMOL/L (ref 136–145)
T4 FREE SERPL-MCNC: 1.1 NG/DL (ref 0.93–1.7)
TRIGL SERPL-MCNC: 121 MG/DL (ref 0–150)
TSH SERPL DL<=0.005 MIU/L-ACNC: 4.97 UIU/ML (ref 0.27–4.2)
VIT B12 SERPL-MCNC: >2000 PG/ML (ref 211–946)
VLDLC SERPL CALC-MCNC: 21 MG/DL (ref 5–40)

## 2022-09-14 ENCOUNTER — TELEPHONE (OUTPATIENT)
Dept: FAMILY MEDICINE CLINIC | Facility: CLINIC | Age: 87
End: 2022-09-14

## 2022-09-14 NOTE — TELEPHONE ENCOUNTER
Caller: Emerita Bazan    Relationship: Self    Best call back number: [phone]     Who are you requesting to speak with (clinical staff, provider,  specific staff member): CLINICAL    What was the call regarding: PATIENT WOULD LIKE A CALLBACK TO SHARE HER LAB RESULTS FROM HER VISIT ON 9/1/22    Do you require a callback: YES    A message was left agin regarding test results

## 2022-09-20 NOTE — TELEPHONE ENCOUNTER
Rx Refill Note  Requested Prescriptions     Pending Prescriptions Disp Refills   • Breo Ellipta 200-25 MCG/INH inhaler [Pharmacy Med Name: Breo Ellipta Inhalation Aerosol Powder Breath Activated 200-25 MCG/INH] 60 each 0     Sig: INHALE 1 PUFF BY MOUTH DAILY      Last office visit with prescribing clinician: 9/1/2022      Next office visit with prescribing clinician: Visit date not found            Conrado De La O MA  09/20/22, 11:59 EDT

## 2022-09-21 ENCOUNTER — TELEPHONE (OUTPATIENT)
Dept: FAMILY MEDICINE CLINIC | Facility: CLINIC | Age: 87
End: 2022-09-21

## 2022-09-21 ENCOUNTER — FLU SHOT (OUTPATIENT)
Dept: FAMILY MEDICINE CLINIC | Facility: CLINIC | Age: 87
End: 2022-09-21

## 2022-09-21 DIAGNOSIS — Z23 NEED FOR INFLUENZA VACCINATION: Primary | ICD-10-CM

## 2022-09-21 PROCEDURE — G0008 ADMIN INFLUENZA VIRUS VAC: HCPCS | Performed by: INTERNAL MEDICINE

## 2022-09-21 PROCEDURE — 90662 IIV NO PRSV INCREASED AG IM: CPT | Performed by: INTERNAL MEDICINE

## 2022-10-05 DIAGNOSIS — E03.9 ACQUIRED HYPOTHYROIDISM: Primary | ICD-10-CM

## 2022-10-13 ENCOUNTER — IMMUNIZATION (OUTPATIENT)
Dept: FAMILY MEDICINE CLINIC | Facility: CLINIC | Age: 87
End: 2022-10-13

## 2022-10-13 DIAGNOSIS — Z23 NEED FOR VACCINATION: Primary | ICD-10-CM

## 2022-10-13 PROCEDURE — 91312 COVID-19 (PFIZER) BIVALENT BOOSTER 12+YRS: CPT | Performed by: INTERNAL MEDICINE

## 2022-10-13 PROCEDURE — 0124A COVID-19 (PFIZER) BIVALENT BOOSTER 12+YRS: CPT | Performed by: INTERNAL MEDICINE

## 2022-10-21 ENCOUNTER — TELEPHONE (OUTPATIENT)
Dept: OBSTETRICS AND GYNECOLOGY | Age: 87
End: 2022-10-21

## 2022-10-21 NOTE — TELEPHONE ENCOUNTER
Caller: Emerita Bazan    Relationship: Self    Best call back number: [phone]    Requested Prescriptions:   BETAMETHAS 1. VALERATE OINTMENT - USP .1%    Requested Prescriptions      No prescriptions requested or ordered in this encounter        Pharmacy where request should be sent: [facility]     Additional details provided by patient: PT CALLED TO GET A NOTE TO DR. JOSEPH ABOUT GETTING THIS MEDICATION REFILLED - SHE STATED THAT [facility] HAS SENT OVER A REFILL REQUEST BUT HAS NOT RECEIVED APPROVAL ([facility]UB DID NOT SEE THIS MEDICATION IN PT'S CHART) - PLEASE CALL PT WITH ANY QUESTIONS OR CONCERNS.    Does the patient have less than a 3 day supply:  [x] Yes  [] No    Ez Joiner Rep   10/21/22 15:21 EDT

## 2022-10-27 ENCOUNTER — TELEPHONE (OUTPATIENT)
Dept: OBSTETRICS AND GYNECOLOGY | Age: 87
End: 2022-10-27

## 2022-10-27 NOTE — TELEPHONE ENCOUNTER
PT of Dr Bergeron calls requesting a refill of her betamethsone ointment, annual exam up to date. Please advise if you will send in to [facility] pharmacy verified in chart

## 2023-02-06 ENCOUNTER — OFFICE VISIT (OUTPATIENT)
Dept: FAMILY MEDICINE CLINIC | Facility: CLINIC | Age: 88
End: 2023-02-06
Payer: MEDICARE

## 2023-02-06 VITALS
OXYGEN SATURATION: 98 % | HEIGHT: 59 IN | HEART RATE: 73 BPM | BODY MASS INDEX: 23.79 KG/M2 | SYSTOLIC BLOOD PRESSURE: 122 MMHG | WEIGHT: 118 LBS | DIASTOLIC BLOOD PRESSURE: 68 MMHG

## 2023-02-06 DIAGNOSIS — R20.0 NUMBNESS OF RIGHT HAND: ICD-10-CM

## 2023-02-06 DIAGNOSIS — E03.9 ACQUIRED HYPOTHYROIDISM: ICD-10-CM

## 2023-02-06 DIAGNOSIS — E78.5 HYPERLIPIDEMIA, UNSPECIFIED HYPERLIPIDEMIA TYPE: Primary | ICD-10-CM

## 2023-02-06 DIAGNOSIS — R10.30 LOWER ABDOMINAL PAIN: ICD-10-CM

## 2023-02-06 PROCEDURE — 99213 OFFICE O/P EST LOW 20 MIN: CPT | Performed by: INTERNAL MEDICINE

## 2023-02-06 NOTE — PROGRESS NOTES
Answers for HPI/ROS submitted by the patient on 2/6/2023  What is the primary reason for your visit?: Abdominal Pain  Chronicity: recurrent  Onset: more than 1 month ago  Onset quality: gradual  Frequency: every several days  Progression since onset: waxing and waning  Pain location: suprapubic region, left flank, right flank  Pain - numeric: 5/10  Pain quality: cramping  Radiates to: LLQ, LUQ  constipation: Yes  flatus: Yes  melena: Yes  Relieved by: bowel movements, movement, standing    Subjective Emerita is here today for follow-up on her cholesterol and thyroid.  She is having some other issues.  She is having some abdominal pain.  She has had this for more than a month.  It is generally in the lower abdomen.  It comes and goes.  It is described as cramping.  She does have some constipation and gas associated with it.  She does not seem to have it if she is up moving around.  She does have hemorrhoids but has not had any significant bleeding.  She still is having a little bit of numbness in her right hand.  This is really no worse.  It is not affecting her strength.  We did discuss the possibility of a nerve conduction test but she does not want to do that.  She is concerned that since she had Clomid that she may have some risk for ovarian tumors.  She is concerned her lower abdominal discomfort could be from that.  She had a colonoscopy and EGD in 2021.  She is still experiencing some pain in the right lower back piriformis area.  She does go for massage treatments once per month.  She does find these beneficial.  Emerita Bazan is a 87 y.o. female.     History of Present Illness     The following portions of the patient's history were reviewed and updated as appropriate: allergies, current medications, past family history, past medical history, past social history, past surgical history and problem list.    Review of Systems   Gastrointestinal: Positive for abdominal pain and constipation.   Neurological:  Positive for numbness. Negative for weakness.       Objective   Physical Exam  Vitals and nursing note reviewed.   Constitutional:       Appearance: Normal appearance.   Cardiovascular:      Rate and Rhythm: Normal rate and regular rhythm.      Pulses: Normal pulses.      Heart sounds: No murmur heard.    No friction rub. No gallop.   Pulmonary:      Effort: Pulmonary effort is normal.      Breath sounds: No wheezing, rhonchi or rales.   Abdominal:      General: Bowel sounds are normal.      Palpations: Abdomen is soft.      Tenderness: There is abdominal tenderness.      Comments: She has some tenderness across the lower abdomen and a little bit in the right mid to upper quadrant.   Neurological:      Mental Status: She is alert.           Assessment & Plan   Diagnoses and all orders for this visit:    1. Hyperlipidemia, unspecified hyperlipidemia type (Primary)  -     Comprehensive Metabolic Panel  -     Lipid Panel    2. Acquired hypothyroidism  -     TSH+Free T4    3. Lower abdominal pain  -     CBC & Differential  -     CT Abdomen Pelvis Without Contrast; Future    4. Numbness of right hand    Emerita is here today for follow-up.  We are going to check on the status of her cholesterol and thyroid.  She is experiencing some lower abdominal pain and has some tenderness in the lower quadrants.  I am going to get a CT scan of her abdomen pelvis.  For the numbness in her right hand we are simply going to observe that.

## 2023-02-07 LAB
ALBUMIN SERPL-MCNC: 4.3 G/DL (ref 3.5–5.2)
ALBUMIN/GLOB SERPL: 2 G/DL
ALP SERPL-CCNC: 50 U/L (ref 39–117)
ALT SERPL-CCNC: 15 U/L (ref 1–33)
AST SERPL-CCNC: 18 U/L (ref 1–32)
BASOPHILS # BLD AUTO: 0.06 10*3/MM3 (ref 0–0.2)
BASOPHILS NFR BLD AUTO: 0.8 % (ref 0–1.5)
BILIRUB SERPL-MCNC: 0.4 MG/DL (ref 0–1.2)
BUN SERPL-MCNC: 22 MG/DL (ref 8–23)
BUN/CREAT SERPL: 23.2 (ref 7–25)
CALCIUM SERPL-MCNC: 9.9 MG/DL (ref 8.6–10.5)
CHLORIDE SERPL-SCNC: 104 MMOL/L (ref 98–107)
CHOLEST SERPL-MCNC: 214 MG/DL (ref 0–200)
CO2 SERPL-SCNC: 29.9 MMOL/L (ref 22–29)
CREAT SERPL-MCNC: 0.95 MG/DL (ref 0.57–1)
EGFRCR SERPLBLD CKD-EPI 2021: 58.8 ML/MIN/1.73
EOSINOPHIL # BLD AUTO: 0.18 10*3/MM3 (ref 0–0.4)
EOSINOPHIL NFR BLD AUTO: 2.5 % (ref 0.3–6.2)
ERYTHROCYTE [DISTWIDTH] IN BLOOD BY AUTOMATED COUNT: 11.8 % (ref 12.3–15.4)
GLOBULIN SER CALC-MCNC: 2.1 GM/DL
GLUCOSE SERPL-MCNC: 96 MG/DL (ref 65–99)
HCT VFR BLD AUTO: 36.6 % (ref 34–46.6)
HDLC SERPL-MCNC: 60 MG/DL (ref 40–60)
HGB BLD-MCNC: 12.2 G/DL (ref 12–15.9)
IMM GRANULOCYTES # BLD AUTO: 0.02 10*3/MM3 (ref 0–0.05)
IMM GRANULOCYTES NFR BLD AUTO: 0.3 % (ref 0–0.5)
LDLC SERPL CALC-MCNC: 131 MG/DL (ref 0–100)
LYMPHOCYTES # BLD AUTO: 1.75 10*3/MM3 (ref 0.7–3.1)
LYMPHOCYTES NFR BLD AUTO: 24.1 % (ref 19.6–45.3)
MCH RBC QN AUTO: 33.2 PG (ref 26.6–33)
MCHC RBC AUTO-ENTMCNC: 33.3 G/DL (ref 31.5–35.7)
MCV RBC AUTO: 99.7 FL (ref 79–97)
MONOCYTES # BLD AUTO: 0.74 10*3/MM3 (ref 0.1–0.9)
MONOCYTES NFR BLD AUTO: 10.2 % (ref 5–12)
NEUTROPHILS # BLD AUTO: 4.5 10*3/MM3 (ref 1.7–7)
NEUTROPHILS NFR BLD AUTO: 62.1 % (ref 42.7–76)
NRBC BLD AUTO-RTO: 0 /100 WBC (ref 0–0.2)
PLATELET # BLD AUTO: 252 10*3/MM3 (ref 140–450)
POTASSIUM SERPL-SCNC: 4.1 MMOL/L (ref 3.5–5.2)
PROT SERPL-MCNC: 6.4 G/DL (ref 6–8.5)
RBC # BLD AUTO: 3.67 10*6/MM3 (ref 3.77–5.28)
SODIUM SERPL-SCNC: 141 MMOL/L (ref 136–145)
T4 FREE SERPL-MCNC: 1.48 NG/DL (ref 0.93–1.7)
TRIGL SERPL-MCNC: 129 MG/DL (ref 0–150)
TSH SERPL DL<=0.005 MIU/L-ACNC: 1.64 UIU/ML (ref 0.27–4.2)
VLDLC SERPL CALC-MCNC: 23 MG/DL (ref 5–40)
WBC # BLD AUTO: 7.25 10*3/MM3 (ref 3.4–10.8)

## 2023-02-08 RX ORDER — FLUTICASONE FUROATE AND VILANTEROL TRIFENATATE 100; 25 UG/1; UG/1
1 POWDER RESPIRATORY (INHALATION) DAILY
Qty: 60 EACH | Refills: 5 | Status: SHIPPED | OUTPATIENT
Start: 2023-02-08

## 2023-02-13 ENCOUNTER — TELEPHONE (OUTPATIENT)
Dept: FAMILY MEDICINE CLINIC | Facility: CLINIC | Age: 88
End: 2023-02-13
Payer: MEDICARE

## 2023-02-13 NOTE — TELEPHONE ENCOUNTER
came into the office stating that when she went to  her breo the pharmacy told her that her generic of breo that she got previously to never use and  is wondering why and making sure it will not effect her negatively if she uses it.   Also wanted to mention that she came in last week and got her blood drawn last week and she still has a nice size bruise and a lump next to it, states it doesn't hurt when she touches it but wanted to make sure it was okay.   Call back at [phone]    A message was left regarding the Breo.  I am not aware of any recalls on generic Breo.  I advised using ice for the hematoma.  Should resolve on its own.

## 2023-03-01 ENCOUNTER — HOSPITAL ENCOUNTER (OUTPATIENT)
Dept: CT IMAGING | Facility: HOSPITAL | Age: 88
Discharge: HOME | End: 2023-03-01
Admitting: INTERNAL MEDICINE
Payer: MEDICARE

## 2023-03-01 DIAGNOSIS — R10.30 LOWER ABDOMINAL PAIN: ICD-10-CM

## 2023-03-01 PROCEDURE — 74176 CT ABD & PELVIS W/O CONTRAST: CPT

## 2023-04-24 ENCOUNTER — OFFICE VISIT (OUTPATIENT)
Dept: OBSTETRICS AND GYNECOLOGY | Age: 88
End: 2023-04-24
Payer: MEDICARE

## 2023-04-24 VITALS
DIASTOLIC BLOOD PRESSURE: 82 MMHG | WEIGHT: 117 LBS | SYSTOLIC BLOOD PRESSURE: 140 MMHG | BODY MASS INDEX: 23.59 KG/M2 | HEIGHT: 59 IN

## 2023-04-24 DIAGNOSIS — Z01.419 ENCOUNTER FOR GYNECOLOGICAL EXAMINATION: Primary | ICD-10-CM

## 2023-04-24 DIAGNOSIS — Z71.89 COUNSELING FOR HORMONE REPLACEMENT THERAPY: ICD-10-CM

## 2023-04-24 DIAGNOSIS — Z12.31 BREAST CANCER SCREENING BY MAMMOGRAM: ICD-10-CM

## 2023-04-24 PROCEDURE — 99397 PER PM REEVAL EST PAT 65+ YR: CPT | Performed by: OBSTETRICS & GYNECOLOGY

## 2023-04-24 RX ORDER — ESTRADIOL 0.5 MG/1
0.5 TABLET ORAL DAILY
Qty: 90 TABLET | Refills: 3 | Status: SHIPPED | OUTPATIENT
Start: 2023-04-24

## 2023-04-24 RX ORDER — MEDROXYPROGESTERONE ACETATE 2.5 MG/1
2.5 TABLET ORAL DAILY
Qty: 90 TABLET | Refills: 3 | Status: SHIPPED | OUTPATIENT
Start: 2023-04-24
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
## 2023-05-08 ENCOUNTER — HOSPITAL ENCOUNTER (OUTPATIENT)
Dept: MAMMOGRAPHY | Facility: HOSPITAL | Age: 88
Discharge: HOME | End: 2023-05-08
Admitting: OBSTETRICS & GYNECOLOGY
Payer: MEDICARE

## 2023-05-08 DIAGNOSIS — Z12.31 BREAST CANCER SCREENING BY MAMMOGRAM: ICD-10-CM

## 2023-05-08 PROCEDURE — 77067 SCR MAMMO BI INCL CAD: CPT

## 2023-05-08 PROCEDURE — 77063 BREAST TOMOSYNTHESIS BI: CPT

## 2023-07-26 ENCOUNTER — OFFICE VISIT (OUTPATIENT)
Dept: FAMILY MEDICINE CLINIC | Facility: CLINIC | Age: 88
End: 2023-07-26
Payer: MEDICARE

## 2023-07-26 VITALS
DIASTOLIC BLOOD PRESSURE: 58 MMHG | HEART RATE: 76 BPM | SYSTOLIC BLOOD PRESSURE: 126 MMHG | BODY MASS INDEX: 24.03 KG/M2 | OXYGEN SATURATION: 97 % | RESPIRATION RATE: 16 BRPM | HEIGHT: 59 IN
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
## 2023-07-26 DIAGNOSIS — E78.5 HYPERLIPIDEMIA, UNSPECIFIED HYPERLIPIDEMIA TYPE: ICD-10-CM

## 2023-07-26 DIAGNOSIS — D64.9 ANEMIA, UNSPECIFIED TYPE: ICD-10-CM

## 2023-07-26 DIAGNOSIS — S00.83XS CONTUSION OF OTHER PART OF HEAD, SEQUELA: ICD-10-CM

## 2023-07-26 DIAGNOSIS — E03.9 ACQUIRED HYPOTHYROIDISM: ICD-10-CM

## 2023-07-26 PROCEDURE — 99214 OFFICE O/P EST MOD 30 MIN: CPT | Performed by: INTERNAL MEDICINE

## 2023-07-26 NOTE — PROGRESS NOTES
Subjective chief complaint is a fall  Emerita Bazan is a 87 y.o. female.     History of Present Illness Emerita is here today for complaints of a fall.  This occurred approximately 1 week ago.  She was carrying some things and trying to put them down when she fell.  She landed on her back.  She did not hurt her back or hips but her head hit the carpet fairly hard.  She did have a hematoma.  She did not go to the emergency room.  The hematoma has resolved and she is not really having much in the way of a headache.  She did have a single episode of some vertigo when she first awakened a few days later.  However it did not last and it has not recurred.  She is not experiencing any numbness or weakness her balance seems to be back to baseline.  She does have some hypothyroidism.  She is due to have that checked as well as her cholesterol.  She has had some intermittent problems with anemia and we need to recheck that today as well  The following portions of the patient's history were reviewed and updated as appropriate: allergies, current medications, and problem list.    Review of Systems   Neurological:  Positive for light-headedness. Negative for weakness, numbness and headache.        She does report that she occasionally forgets names but they eventually come to her.     Objective   Physical Exam  Vitals and nursing note reviewed.   Constitutional:       Appearance: Normal appearance.   HENT:      Head:      Comments: There is a residual hematoma in the left occipital parietal area.  Cardiovascular:      Rate and Rhythm: Normal rate.   Pulmonary:      Effort: Pulmonary effort is normal.   Neurological:      General: No focal deficit present.      Mental Status: She is alert.      Cranial Nerves: No cranial nerve deficit.      Coordination: Coordination normal.   Psychiatric:         Mood and Affect: Mood normal.         Behavior: Behavior normal.         Assessment & Plan   Diagnoses and all orders for this  visit:    1. Fall, sequela (Primary)    2. Contusion of other part of head, sequela    3. Hyperlipidemia, unspecified hyperlipidemia type  -     Comprehensive Metabolic Panel  -     Lipid Panel    4. Acquired hypothyroidism  -     TSH+Free T4    5. Anemia, unspecified type  -     CBC & Differential      Emerita is here today for follow-up after a fall.  Neurologically today everything seems to check out okay.  She does have a small residual hematoma in the left occipital parietal area.  I do not think doing a CAT scan at this point in time is really going to give us much information.  She can certainly contact me if symptoms of dizziness or focal weakness or numbness occur.

## 2023-07-27 LAB
ALBUMIN SERPL-MCNC: 4.3 G/DL (ref 3.5–5.2)
ALBUMIN/GLOB SERPL: 2.4 G/DL
ALP SERPL-CCNC: 51 U/L (ref 39–117)
ALT SERPL-CCNC: 14 U/L (ref 1–33)
AST SERPL-CCNC: 19 U/L (ref 1–32)
BASOPHILS # BLD AUTO: 0.06 10*3/MM3 (ref 0–0.2)
BASOPHILS NFR BLD AUTO: 1 % (ref 0–1.5)
BILIRUB SERPL-MCNC: 0.4 MG/DL (ref 0–1.2)
BUN SERPL-MCNC: 19 MG/DL (ref 8–23)
BUN/CREAT SERPL: 24.4 (ref 7–25)
CALCIUM SERPL-MCNC: 9.5 MG/DL (ref 8.6–10.5)
CHLORIDE SERPL-SCNC: 105 MMOL/L (ref 98–107)
CHOLEST SERPL-MCNC: 208 MG/DL (ref 0–200)
CO2 SERPL-SCNC: 25.2 MMOL/L (ref 22–29)
CREAT SERPL-MCNC: 0.78 MG/DL (ref 0.57–1)
EGFRCR SERPLBLD CKD-EPI 2021: 74.5 ML/MIN/1.73
EOSINOPHIL # BLD AUTO: 0.12 10*3/MM3 (ref 0–0.4)
EOSINOPHIL NFR BLD AUTO: 2 % (ref 0.3–6.2)
ERYTHROCYTE [DISTWIDTH] IN BLOOD BY AUTOMATED COUNT: 12 % (ref 12.3–15.4)
GLOBULIN SER CALC-MCNC: 1.8 GM/DL
GLUCOSE SERPL-MCNC: 96 MG/DL (ref 65–99)
HCT VFR BLD AUTO: 37.2 % (ref 34–46.6)
HDLC SERPL-MCNC: 61 MG/DL (ref 40–60)
HGB BLD-MCNC: 12.6 G/DL (ref 12–15.9)
IMM GRANULOCYTES # BLD AUTO: 0.02 10*3/MM3 (ref 0–0.05)
IMM GRANULOCYTES NFR BLD AUTO: 0.3 % (ref 0–0.5)
LDLC SERPL CALC-MCNC: 123 MG/DL (ref 0–100)
LYMPHOCYTES # BLD AUTO: 1.44 10*3/MM3 (ref 0.7–3.1)
LYMPHOCYTES NFR BLD AUTO: 24.2 % (ref 19.6–45.3)
MCH RBC QN AUTO: 33.5 PG (ref 26.6–33)
MCHC RBC AUTO-ENTMCNC: 33.9 G/DL (ref 31.5–35.7)
MCV RBC AUTO: 98.9 FL (ref 79–97)
MONOCYTES # BLD AUTO: 0.55 10*3/MM3 (ref 0.1–0.9)
MONOCYTES NFR BLD AUTO: 9.3 % (ref 5–12)
NEUTROPHILS # BLD AUTO: 3.75 10*3/MM3 (ref 1.7–7)
NEUTROPHILS NFR BLD AUTO: 63.2 % (ref 42.7–76)
NRBC BLD AUTO-RTO: 0 /100 WBC (ref 0–0.2)
PLATELET # BLD AUTO: 277 10*3/MM3 (ref 140–450)
POTASSIUM SERPL-SCNC: 4.4 MMOL/L (ref 3.5–5.2)
PROT SERPL-MCNC: 6.1 G/DL (ref 6–8.5)
RBC # BLD AUTO: 3.76 10*6/MM3 (ref 3.77–5.28)
SODIUM SERPL-SCNC: 141 MMOL/L (ref 136–145)
T4 FREE SERPL-MCNC: 1.71 NG/DL (ref 0.93–1.7)
TRIGL SERPL-MCNC: 136 MG/DL (ref 0–150)
TSH SERPL DL<=0.005 MIU/L-ACNC: 2.28 UIU/ML (ref 0.27–4.2)
VLDLC SERPL CALC-MCNC: 24 MG/DL (ref 5–40)
WBC # BLD AUTO: 5.94 10*3/MM3 (ref 3.4–10.8)

## 2023-08-14 RX ORDER — LEVOTHYROXINE SODIUM 0.07 MG/1
TABLET ORAL
Qty: 90 TABLET | Refills: 3 | Status: SHIPPED | OUTPATIENT
Start: 2023-08-14

## 2023-11-03 ENCOUNTER — OFFICE VISIT (OUTPATIENT)
Dept: FAMILY MEDICINE CLINIC | Facility: CLINIC | Age: 88
End: 2023-11-03
Payer: MEDICARE

## 2023-11-03 VITALS
HEIGHT: 59 IN | RESPIRATION RATE: 16 BRPM | DIASTOLIC BLOOD PRESSURE: 57 MMHG | OXYGEN SATURATION: 100 % | HEART RATE: 78 BPM | SYSTOLIC BLOOD PRESSURE: 110 MMHG | WEIGHT: 120 LBS | BODY MASS INDEX: 24.19 KG/M2

## 2023-11-03 DIAGNOSIS — E03.9 ACQUIRED HYPOTHYROIDISM: ICD-10-CM

## 2023-11-03 DIAGNOSIS — R39.15 URGENCY OF URINATION: Primary | ICD-10-CM

## 2023-11-03 DIAGNOSIS — R51.9 OCCIPITAL HEADACHE: ICD-10-CM

## 2023-11-03 DIAGNOSIS — R31.9 HEMATURIA, UNSPECIFIED TYPE: ICD-10-CM

## 2023-11-03 DIAGNOSIS — E78.5 HYPERLIPIDEMIA, UNSPECIFIED HYPERLIPIDEMIA TYPE: ICD-10-CM

## 2023-11-03 LAB
BILIRUB BLD-MCNC: NEGATIVE MG/DL
CLARITY, POC: CLEAR
COLOR UR: YELLOW
GLUCOSE UR STRIP-MCNC: NEGATIVE MG/DL
KETONES UR QL: NEGATIVE
LEUKOCYTE EST, POC: ABNORMAL
NITRITE UR-MCNC: NEGATIVE MG/ML
PH UR: 5.5 [PH] (ref 5–8)
PROT UR STRIP-MCNC: NEGATIVE MG/DL
RBC # UR STRIP: NEGATIVE /UL
SP GR UR: 1.02 (ref 1–1.03)
UROBILINOGEN UR QL: NORMAL

## 2023-11-03 PROCEDURE — 99214 OFFICE O/P EST MOD 30 MIN: CPT | Performed by: INTERNAL MEDICINE

## 2023-11-03 NOTE — PROGRESS NOTES
"Chief Complaint  Difficulty Urinating (/Having issues with feeling like she has to urinate but than can't go, blood in urine/)    Subjective        Emerita Bazan presents to [facility]  History of Present Illness Emerita is here today for difficulty urinating.  She has a sense of urgency that she has to go and then she cannot go.  She will feel a sense of pressure.  She is not having burning.  She may have seen a little bit of blood.  Then sometimes she will have problems with urinary frequency but still no burning.  Additionally she is complaining of some headaches.  These are occipital in nature.  She did suffer a fall several months back.  She did have a head injury but at that time was really not having any issues.  She is complaining of being more sleepy and tired.  She is sleeping more during the day.  She also admits to increased stress over the Anti-semitism.  She does have hypothyroidism and she is due to have that checked.  That could be a reason for her fatigue.  She had problems remotely with anemia but has not been anemic recently.    Objective   Vital Signs:  /57   Pulse 78   Resp 16   Ht 148.6 cm (58.5\")   Wt 54.4 kg (120 lb)   SpO2 100%   BMI 24.65 kg/m²   Estimated body mass index is 24.65 kg/m² as calculated from the following:    Height as of this encounter: 148.6 cm (58.5\").    Weight as of this encounter: 54.4 kg (120 lb).       BMI is within normal parameters. No other follow-up for BMI required.      Physical Exam  Vitals and nursing note reviewed.   Constitutional:       Appearance: Normal appearance.   HENT:      Ears:      Comments: There is mild cerumen bilaterally but the tympanic membranes look okay  Neck:      Vascular: No carotid bruit.   Cardiovascular:      Rate and Rhythm: Normal rate and regular rhythm.      Pulses: Normal pulses.      Heart sounds: No murmur heard.     No friction rub. No gallop.   Pulmonary:      Effort: Pulmonary effort " is normal.      Breath sounds: No wheezing, rhonchi or rales.   Abdominal:      General: Bowel sounds are normal.      Palpations: Abdomen is soft.      Comments: There is some suprapubic tenderness.   Musculoskeletal:      Right lower leg: No edema.      Left lower leg: No edema.   Neurological:      General: No focal deficit present.      Mental Status: She is alert.      Cranial Nerves: No cranial nerve deficit.   Psychiatric:         Mood and Affect: Mood normal.         Behavior: Behavior normal.      Result Review :  The following data was reviewed by: Humberto Lopez MD on 11/03/2023:  CMP          2/6/2023    08:16 7/26/2023    09:44   CMP   Glucose 96  96    BUN 22  19    Creatinine 0.95  0.78    Sodium 141  141    Potassium 4.1  4.4    Chloride 104  105    Calcium 9.9  9.5    Total Protein 6.4  6.1    Albumin 4.3  4.3    Globulin 2.1  1.8    Total Bilirubin 0.4  0.4    Alkaline Phosphatase 50  51    AST (SGOT) 18  19    ALT (SGPT) 15  14    BUN/Creatinine Ratio 23.2  24.4      CBC w/diff          2/6/2023    08:16 7/26/2023    09:44   CBC w/Diff   WBC 7.25  5.94    RBC 3.67  3.76    Hemoglobin 12.2  12.6    Hematocrit 36.6  37.2    MCV 99.7  98.9    MCH 33.2  33.5    MCHC 33.3  33.9    RDW 11.8  12.0    Platelets 252  277    Neutrophil Rel % 62.1  63.2    Lymphocyte Rel % 24.1  24.2    Monocyte Rel % 10.2  9.3    Eosinophil Rel % 2.5  2.0    Basophil Rel % 0.8  1.0      TSH          2/6/2023    08:16 7/26/2023    09:44   TSH   TSH 1.640  2.280      Data reviewed : Radiologic studies Mri brain 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 Urgency of urination (Primary)  -     Urine Culture - Urine, Urine, Clean Catch  -     Urinalysis With Microscopic - Urine, Clean Catch    2. Hematuria, unspecified type  -     Urine Culture - Urine, Urine, Clean Catch  -     Urinalysis With Microscopic - Urine, Clean  Catch    3. Occipital headache  -     CBC & Differential  -     CT Head Without Contrast; Future    4. Acquired hypothyroidism  -     TSH+Free T4    5. Hyperlipidemia, unspecified hyperlipidemia type  -     Comprehensive Metabolic Panel  -     Lipid Panel    Emerita is here today for some urinary issues but she is also having some other issues of headache and fatigue.  We are going to check some lab work to look at that.       I spent 35 minutes caring for Emerita on this date of service. This time includes time spent by me in the following activities:preparing for the visit, reviewing tests, obtaining and/or reviewing a separately obtained history, performing a medically appropriate examination and/or evaluation , counseling and educating the patient/family/caregiver, ordering medications, tests, or procedures, documenting information in the medical record, and independently interpreting results and communicating that information with the patient/family/caregiver  Follow Up   No follow-ups on file.  Patient was given instructions and counseling regarding her condition or for health maintenance advice. Please see specific information pulled into the AVS if appropriate.

## 2023-11-07 LAB
ALBUMIN SERPL-MCNC: 3.9 G/DL (ref 3.7–4.7)
ALBUMIN/GLOB SERPL: 1.4 {RATIO} (ref 1.2–2.2)
ALP SERPL-CCNC: 126 IU/L (ref 44–121)
ALT SERPL-CCNC: 30 IU/L (ref 0–32)
APPEARANCE UR: ABNORMAL
AST SERPL-CCNC: 23 IU/L (ref 0–40)
BACTERIA #/AREA URNS HPF: ABNORMAL /[HPF]
BACTERIA UR CULT: ABNORMAL
BACTERIA UR CULT: ABNORMAL
BASOPHILS # BLD AUTO: 0.1 X10E3/UL (ref 0–0.2)
BASOPHILS NFR BLD AUTO: 1 %
BILIRUB SERPL-MCNC: 0.3 MG/DL (ref 0–1.2)
BILIRUB UR QL STRIP: NEGATIVE
BUN SERPL-MCNC: 24 MG/DL (ref 8–27)
BUN/CREAT SERPL: 24 (ref 12–28)
CALCIUM SERPL-MCNC: 9.6 MG/DL (ref 8.7–10.3)
CASTS URNS QL MICRO: ABNORMAL /LPF
CHLORIDE SERPL-SCNC: 102 MMOL/L (ref 96–106)
CHOLEST SERPL-MCNC: 169 MG/DL (ref 100–199)
CO2 SERPL-SCNC: 24 MMOL/L (ref 20–29)
COLOR UR: YELLOW
CREAT SERPL-MCNC: 0.98 MG/DL (ref 0.57–1)
CRYSTALS URNS MICRO: ABNORMAL
EGFRCR SERPLBLD CKD-EPI 2021: 56 ML/MIN/1.73
EOSINOPHIL # BLD AUTO: 0.2 X10E3/UL (ref 0–0.4)
EOSINOPHIL NFR BLD AUTO: 2 %
EPI CELLS #/AREA URNS HPF: >10 /HPF (ref 0–10)
ERYTHROCYTE [DISTWIDTH] IN BLOOD BY AUTOMATED COUNT: 12 % (ref 11.7–15.4)
GLOBULIN SER CALC-MCNC: 2.7 G/DL (ref 1.5–4.5)
GLUCOSE SERPL-MCNC: 102 MG/DL (ref 70–99)
GLUCOSE UR QL STRIP: NEGATIVE
HCT VFR BLD AUTO: 35.7 % (ref 34–46.6)
HDLC SERPL-MCNC: 52 MG/DL
HGB BLD-MCNC: 11.8 G/DL (ref 11.1–15.9)
HGB UR QL STRIP: ABNORMAL
IMM GRANULOCYTES # BLD AUTO: 0 X10E3/UL (ref 0–0.1)
IMM GRANULOCYTES NFR BLD AUTO: 0 %
KETONES UR QL STRIP: NEGATIVE
LDLC SERPL CALC-MCNC: 97 MG/DL (ref 0–99)
LEUKOCYTE ESTERASE UR QL STRIP: ABNORMAL
LYMPHOCYTES # BLD AUTO: 2 X10E3/UL (ref 0.7–3.1)
LYMPHOCYTES NFR BLD AUTO: 17 %
MCH RBC QN AUTO: 32.7 PG (ref 26.6–33)
MCHC RBC AUTO-ENTMCNC: 33.1 G/DL (ref 31.5–35.7)
MCV RBC AUTO: 99 FL (ref 79–97)
MICRO URNS: ABNORMAL
MONOCYTES # BLD AUTO: 1.3 X10E3/UL (ref 0.1–0.9)
MONOCYTES NFR BLD AUTO: 11 %
NEUTROPHILS # BLD AUTO: 8.2 X10E3/UL (ref 1.4–7)
NEUTROPHILS NFR BLD AUTO: 69 %
NITRITE UR QL STRIP: POSITIVE
OTHER ANTIBIOTIC SUSC ISLT: ABNORMAL
PH UR STRIP: 6 [PH] (ref 5–7.5)
PLATELET # BLD AUTO: 389 X10E3/UL (ref 150–450)
POTASSIUM SERPL-SCNC: 4.3 MMOL/L (ref 3.5–5.2)
PROT SERPL-MCNC: 6.6 G/DL (ref 6–8.5)
PROT UR QL STRIP: ABNORMAL
RBC # BLD AUTO: 3.61 X10E6/UL (ref 3.77–5.28)
RBC #/AREA URNS HPF: ABNORMAL /HPF (ref 0–2)
SODIUM SERPL-SCNC: 137 MMOL/L (ref 134–144)
SP GR UR STRIP: 1.02 (ref 1–1.03)
T4 FREE SERPL-MCNC: 1.21 NG/DL (ref 0.82–1.77)
TRIGL SERPL-MCNC: 109 MG/DL (ref 0–149)
TSH SERPL DL<=0.005 MIU/L-ACNC: 1.88 UIU/ML (ref 0.45–4.5)
UNIDENT CRYS URNS QL MICRO: PRESENT
UROBILINOGEN UR STRIP-MCNC: 0.2 MG/DL (ref 0.2–1)
VLDLC SERPL CALC-MCNC: 20 MG/DL (ref 5–40)
WBC # BLD AUTO: 11.8 X10E3/UL (ref 3.4–10.8)
WBC #/AREA URNS HPF: >30 /HPF (ref 0–5)

## 2023-11-08 RX ORDER — NITROFURANTOIN 25; 75 MG/1; MG/1
100 CAPSULE ORAL 2 TIMES DAILY
Qty: 14 CAPSULE | Refills: 0 | Status: SHIPPED | OUTPATIENT
Start: 2023-11-08

## 2023-11-17 ENCOUNTER — HOSPITAL ENCOUNTER (OUTPATIENT)
Facility: HOSPITAL | Age: 88
Discharge: HOME | End: 2023-11-17
Admitting: INTERNAL MEDICINE
Payer: MEDICARE

## 2023-11-17 DIAGNOSIS — R51.9 OCCIPITAL HEADACHE: ICD-10-CM

## 2023-11-17 PROCEDURE — 70450 CT HEAD/BRAIN W/O DYE: CPT

## 2023-12-07 RX ORDER — FLUTICASONE FUROATE AND VILANTEROL TRIFENATATE 100; 25 UG/1; UG/1
POWDER RESPIRATORY (INHALATION) DAILY
Qty: 60 EACH | Refills: 5 | Status: SHIPPED | OUTPATIENT
Start: 2023-12-07

## 2024-03-22 ENCOUNTER — TRANSCRIBE ORDERS (OUTPATIENT)
Dept: ADMINISTRATIVE | Facility: HOSPITAL | Age: 89
End: 2024-03-22
Payer: MEDICARE

## 2024-03-22 DIAGNOSIS — Z12.31 VISIT FOR SCREENING MAMMOGRAM: Primary | ICD-10-CM

## 2024-04-02 ENCOUNTER — TELEPHONE (OUTPATIENT)
Dept: OBSTETRICS AND GYNECOLOGY | Age: 89
End: 2024-04-02
Payer: MEDICARE

## 2024-04-02 NOTE — TELEPHONE ENCOUNTER
Pt calling needing refill on betamethasone ointment. Pt last seen in office on 4/24/23. Pt pharmacy verified & on file. Please advise.

## 2024-05-08 ENCOUNTER — OFFICE VISIT (OUTPATIENT)
Dept: FAMILY MEDICINE CLINIC | Facility: CLINIC | Age: 89
End: 2024-05-08
Payer: MEDICARE

## 2024-05-08 VITALS
HEIGHT: 59 IN | SYSTOLIC BLOOD PRESSURE: 110 MMHG | HEART RATE: 73 BPM | DIASTOLIC BLOOD PRESSURE: 60 MMHG | OXYGEN SATURATION: 96 % | WEIGHT: 121 LBS | BODY MASS INDEX: 24.39 KG/M2 | RESPIRATION RATE: 16 BRPM

## 2024-05-08 DIAGNOSIS — E78.5 HYPERLIPIDEMIA, UNSPECIFIED HYPERLIPIDEMIA TYPE: ICD-10-CM

## 2024-05-08 DIAGNOSIS — Z00.00 ENCOUNTER FOR SUBSEQUENT ANNUAL WELLNESS VISIT (AWV) IN MEDICARE PATIENT: Primary | ICD-10-CM

## 2024-05-08 DIAGNOSIS — Z13.820 ENCOUNTER FOR OSTEOPOROSIS SCREENING IN ASYMPTOMATIC POSTMENOPAUSAL PATIENT: ICD-10-CM

## 2024-05-08 DIAGNOSIS — E03.9 ACQUIRED HYPOTHYROIDISM: ICD-10-CM

## 2024-05-08 DIAGNOSIS — Z78.0 ENCOUNTER FOR OSTEOPOROSIS SCREENING IN ASYMPTOMATIC POSTMENOPAUSAL PATIENT: ICD-10-CM

## 2024-05-08 PROCEDURE — 90677 PCV20 VACCINE IM: CPT | Performed by: INTERNAL MEDICINE

## 2024-05-08 PROCEDURE — G0439 PPPS, SUBSEQ VISIT: HCPCS | Performed by: INTERNAL MEDICINE

## 2024-05-08 PROCEDURE — 1125F AMNT PAIN NOTED PAIN PRSNT: CPT | Performed by: INTERNAL MEDICINE

## 2024-05-08 PROCEDURE — 1160F RVW MEDS BY RX/DR IN RCRD: CPT | Performed by: INTERNAL MEDICINE

## 2024-05-08 PROCEDURE — G0009 ADMIN PNEUMOCOCCAL VACCINE: HCPCS | Performed by: INTERNAL MEDICINE

## 2024-05-08 PROCEDURE — 1159F MED LIST DOCD IN RCRD: CPT | Performed by: INTERNAL MEDICINE

## 2024-05-09 LAB
ALBUMIN SERPL-MCNC: 4.3 G/DL (ref 3.5–5.2)
ALBUMIN/GLOB SERPL: 1.9 G/DL
ALP SERPL-CCNC: 45 U/L (ref 39–117)
ALT SERPL-CCNC: 11 U/L (ref 1–33)
AST SERPL-CCNC: 19 U/L (ref 1–32)
BASOPHILS # BLD AUTO: 0.06 10*3/MM3 (ref 0–0.2)
BASOPHILS NFR BLD AUTO: 0.8 % (ref 0–1.5)
BILIRUB SERPL-MCNC: 0.5 MG/DL (ref 0–1.2)
BUN SERPL-MCNC: 20 MG/DL (ref 8–23)
BUN/CREAT SERPL: 24.1 (ref 7–25)
CALCIUM SERPL-MCNC: 9.7 MG/DL (ref 8.6–10.5)
CHLORIDE SERPL-SCNC: 103 MMOL/L (ref 98–107)
CHOLEST SERPL-MCNC: 230 MG/DL (ref 0–200)
CO2 SERPL-SCNC: 25.3 MMOL/L (ref 22–29)
CREAT SERPL-MCNC: 0.83 MG/DL (ref 0.57–1)
EGFRCR SERPLBLD CKD-EPI 2021: 68.8 ML/MIN/1.73
EOSINOPHIL # BLD AUTO: 0.1 10*3/MM3 (ref 0–0.4)
EOSINOPHIL NFR BLD AUTO: 1.4 % (ref 0.3–6.2)
ERYTHROCYTE [DISTWIDTH] IN BLOOD BY AUTOMATED COUNT: 12.2 % (ref 12.3–15.4)
GLOBULIN SER CALC-MCNC: 2.3 GM/DL
GLUCOSE SERPL-MCNC: 95 MG/DL (ref 65–99)
HCT VFR BLD AUTO: 37.5 % (ref 34–46.6)
HDLC SERPL-MCNC: 69 MG/DL (ref 40–60)
HGB BLD-MCNC: 12.6 G/DL (ref 12–15.9)
IMM GRANULOCYTES # BLD AUTO: 0.02 10*3/MM3 (ref 0–0.05)
IMM GRANULOCYTES NFR BLD AUTO: 0.3 % (ref 0–0.5)
LDLC SERPL CALC-MCNC: 138 MG/DL (ref 0–100)
LYMPHOCYTES # BLD AUTO: 1.47 10*3/MM3 (ref 0.7–3.1)
LYMPHOCYTES NFR BLD AUTO: 20.3 % (ref 19.6–45.3)
MCH RBC QN AUTO: 33.2 PG (ref 26.6–33)
MCHC RBC AUTO-ENTMCNC: 33.6 G/DL (ref 31.5–35.7)
MCV RBC AUTO: 98.7 FL (ref 79–97)
MONOCYTES # BLD AUTO: 0.65 10*3/MM3 (ref 0.1–0.9)
MONOCYTES NFR BLD AUTO: 9 % (ref 5–12)
NEUTROPHILS # BLD AUTO: 4.95 10*3/MM3 (ref 1.7–7)
NEUTROPHILS NFR BLD AUTO: 68.2 % (ref 42.7–76)
NRBC BLD AUTO-RTO: 0 /100 WBC (ref 0–0.2)
PLATELET # BLD AUTO: 296 10*3/MM3 (ref 140–450)
POTASSIUM SERPL-SCNC: 4.1 MMOL/L (ref 3.5–5.2)
PROT SERPL-MCNC: 6.6 G/DL (ref 6–8.5)
RBC # BLD AUTO: 3.8 10*6/MM3 (ref 3.77–5.28)
SODIUM SERPL-SCNC: 139 MMOL/L (ref 136–145)
T4 FREE SERPL-MCNC: 1.4 NG/DL (ref 0.93–1.7)
TRIGL SERPL-MCNC: 133 MG/DL (ref 0–150)
TSH SERPL DL<=0.005 MIU/L-ACNC: 1.95 UIU/ML (ref 0.27–4.2)
VLDLC SERPL CALC-MCNC: 23 MG/DL (ref 5–40)
WBC # BLD AUTO: 7.25 10*3/MM3 (ref 3.4–10.8)

## 2024-05-13 ENCOUNTER — HOSPITAL ENCOUNTER (OUTPATIENT)
Dept: MAMMOGRAPHY | Facility: HOSPITAL | Age: 89
Discharge: HOME | End: 2024-05-13
Admitting: OBSTETRICS & GYNECOLOGY
Payer: MEDICARE

## 2024-05-13 DIAGNOSIS — Z12.31 VISIT FOR SCREENING MAMMOGRAM: ICD-10-CM

## 2024-05-13 PROCEDURE — 77067 SCR MAMMO BI INCL CAD: CPT

## 2024-05-13 PROCEDURE — 77063 BREAST TOMOSYNTHESIS BI: CPT

## 2024-05-23 RX ORDER — ESTRADIOL 0.5 MG/1
0.5 TABLET ORAL DAILY
Qty: 90 TABLET | Refills: 3 | Status: SHIPPED | OUTPATIENT
Start: 2024-05-23

## 2024-06-11 RX ORDER — FLUTICASONE FUROATE AND VILANTEROL TRIFENATATE 100; 25 UG/1; UG/1
POWDER RESPIRATORY (INHALATION)
Qty: 60 EACH | Refills: 5 | Status: SHIPPED | OUTPATIENT
Start: 2024-06-11

## 2024-06-18 RX ORDER — MEDROXYPROGESTERONE ACETATE 2.5 MG/1
2.5 TABLET ORAL DAILY
Qty: 90 TABLET | Refills: 3 | Status: SHIPPED | OUTPATIENT
Start: 2024-06-18

## 2024-07-13 ENCOUNTER — HOSPITAL ENCOUNTER (OUTPATIENT)
Dept: BONE DENSITY | Facility: HOSPITAL | Age: 89
Discharge: HOME | End: 2024-07-13
Payer: MEDICARE

## 2024-07-13 DIAGNOSIS — Z78.0 ENCOUNTER FOR OSTEOPOROSIS SCREENING IN ASYMPTOMATIC POSTMENOPAUSAL PATIENT: ICD-10-CM

## 2024-07-13 DIAGNOSIS — Z13.820 ENCOUNTER FOR OSTEOPOROSIS SCREENING IN ASYMPTOMATIC POSTMENOPAUSAL PATIENT: ICD-10-CM

## 2024-07-13 PROCEDURE — 77080 DXA BONE DENSITY AXIAL: CPT

## 2024-08-14 RX ORDER — LEVOTHYROXINE SODIUM 0.07 MG/1
75 TABLET ORAL DAILY
Qty: 90 TABLET | Refills: 3 | Status: SHIPPED | OUTPATIENT
Start: 2024-08-14

## 2024-09-12 ENCOUNTER — OFFICE VISIT (OUTPATIENT)
Dept: FAMILY MEDICINE CLINIC | Facility: CLINIC | Age: 89
End: 2024-09-12
Payer: MEDICARE

## 2024-09-12 VITALS
HEIGHT: 59 IN | SYSTOLIC BLOOD PRESSURE: 106 MMHG | RESPIRATION RATE: 16 BRPM | OXYGEN SATURATION: 96 % | DIASTOLIC BLOOD PRESSURE: 58 MMHG | BODY MASS INDEX: 23.99 KG/M2 | HEART RATE: 75 BPM | WEIGHT: 119 LBS

## 2024-09-12 DIAGNOSIS — H91.93 BILATERAL HEARING LOSS, UNSPECIFIED HEARING LOSS TYPE: Primary | ICD-10-CM

## 2024-09-12 PROCEDURE — 99213 OFFICE O/P EST LOW 20 MIN: CPT | Performed by: INTERNAL MEDICINE

## 2024-09-12 PROCEDURE — 1125F AMNT PAIN NOTED PAIN PRSNT: CPT | Performed by: INTERNAL MEDICINE

## 2024-09-12 NOTE — PROGRESS NOTES
Subjective chief complaint is hearing problems  Emerita Bazan is a 88 y.o. female.     Hearing Problem   Emerita is here today for some hearing problems.  She saw an audiologist to painted a complete picture about her hearing.  She seems to hear fine in the examining room one-on-one.  The audiologist said he thought she had fluid behind her ears.  She is not sure that she wants hearing aids.  However she would like to find out if she has fluid behind her ears.    The following portions of the patient's history were reviewed and updated as appropriate: allergies, current medications, and problem list.    Review of Systems   HENT:  Positive for hearing loss. Negative for ear pain.      Objective   Physical Exam  Vitals and nursing note reviewed.   Constitutional:       Appearance: Normal appearance.   HENT:      Right Ear: There is impacted cerumen.      Ears:      Comments: The left tympanic membrane is difficult to visualize due to a tortuous ear canal.  However visualized portions of it look to be okay  Neck:      Vascular: No carotid bruit.   Cardiovascular:      Rate and Rhythm: Normal rate and regular rhythm.   Pulmonary:      Effort: Pulmonary effort is normal.      Breath sounds: No wheezing, rhonchi or rales.   Neurological:      Mental Status: She is alert.     Assessment & Plan   Diagnoses and all orders for this visit:    1. Bilateral hearing loss, unspecified hearing loss type (Primary)  -     Ambulatory Referral to ENT (Otolaryngology)      Emerita is here today for some hearing loss.  I am going to have her see an ear nose and throat doctor make sure there is nothing in terms of fluid behind her ears that would be causing this.  She is not sure she wants hearing aids and she seems to hear okay in the exam room.

## 2024-10-21 DIAGNOSIS — R31.0 GROSS HEMATURIA: Primary | ICD-10-CM

## 2024-10-22 ENCOUNTER — CLINICAL SUPPORT (OUTPATIENT)
Dept: FAMILY MEDICINE CLINIC | Facility: CLINIC | Age: 89
End: 2024-10-22
Payer: MEDICARE

## 2024-10-22 DIAGNOSIS — R31.0 GROSS HEMATURIA: ICD-10-CM

## 2024-10-22 LAB
BILIRUB BLD-MCNC: NEGATIVE MG/DL
CLARITY, POC: ABNORMAL
COLOR UR: ABNORMAL
EXPIRATION DATE: ABNORMAL
GLUCOSE UR STRIP-MCNC: NEGATIVE MG/DL
KETONES UR QL: NEGATIVE
LEUKOCYTE EST, POC: ABNORMAL
Lab: ABNORMAL
NITRITE UR-MCNC: POSITIVE MG/ML
PH UR: 6 [PH] (ref 5–8)
PROT UR STRIP-MCNC: ABNORMAL MG/DL
RBC # UR STRIP: ABNORMAL /UL
SP GR UR: 1.01 (ref 1–1.03)
UROBILINOGEN UR QL: NORMAL

## 2024-10-22 PROCEDURE — 81003 URINALYSIS AUTO W/O SCOPE: CPT | Performed by: INTERNAL MEDICINE

## 2024-10-23 RX ORDER — NITROFURANTOIN 25; 75 MG/1; MG/1
100 CAPSULE ORAL 2 TIMES DAILY
Qty: 14 CAPSULE | Refills: 0 | Status: SHIPPED | OUTPATIENT
Start: 2024-10-23

## 2024-10-30 ENCOUNTER — TELEPHONE (OUTPATIENT)
Dept: FAMILY MEDICINE CLINIC | Facility: CLINIC | Age: 89
End: 2024-10-30

## 2024-10-30 NOTE — TELEPHONE ENCOUNTER
Caller: Emerita Bazan    Relationship: Self    Best call back number: [phone]     What is the best time to reach you: ANYTIME    Who are you requesting to speak with (clinical staff, provider,  specific staff member):     What was the call regarding: PATIENT STATED SHE WAS REFERRED TO DR BACA, AN ENT BY DR MUNOZ.    PATIENT STATED SHE HAS BEEN RECEIVING LETTERS THAT SHE HAS NOT SCHEDULED WITH THE ENT, HOWEVER PATIENT STATED SHE WAS SEEN BY DR BACA ON 10-.

## 2024-11-13 ENCOUNTER — OFFICE VISIT (OUTPATIENT)
Dept: FAMILY MEDICINE CLINIC | Facility: CLINIC | Age: 89
End: 2024-11-13
Payer: MEDICARE

## 2024-11-13 VITALS
HEIGHT: 59 IN | HEART RATE: 78 BPM | OXYGEN SATURATION: 97 % | DIASTOLIC BLOOD PRESSURE: 68 MMHG | BODY MASS INDEX: 24.96 KG/M2 | WEIGHT: 123.8 LBS | SYSTOLIC BLOOD PRESSURE: 114 MMHG

## 2024-11-13 DIAGNOSIS — E03.9 ACQUIRED HYPOTHYROIDISM: ICD-10-CM

## 2024-11-13 DIAGNOSIS — E78.5 HYPERLIPIDEMIA, UNSPECIFIED HYPERLIPIDEMIA TYPE: ICD-10-CM

## 2024-11-13 DIAGNOSIS — H90.3 SENSORINEURAL HEARING LOSS (SNHL) OF BOTH EARS: ICD-10-CM

## 2024-11-13 DIAGNOSIS — R31.0 GROSS HEMATURIA: Primary | ICD-10-CM

## 2024-11-13 NOTE — PROGRESS NOTES
Chief Complaint  Hearing Problem (Blood in urine follow up /Wants to talk to you about flu shot )    Subjective        Emerita Bazan presents to [facility]  Hearing Problem   Emerita is here today to discuss several issues.  We did discuss the blood in her urine.  It seems to have resolved with treatment with antibiotic.  We did discuss other possible causes of blood in the urine including bladder cancer.  For that reason we did discuss having her see a urologist.  We did discuss her hearing.  She went to see an audiologist.  They apparently told her hearing was bad enough to need a hearing aid.  She then went to see an ear nose and throat doctor.  First the audiologist saw her and removed some wax from her ears and then test her ears again.  The ear nose and throat doctor however said that she really did not necessarily need hearing aids.  I did advise that she likely does not need them and if she is not committed to getting them she should not get them at this point.  She did discuss some numbness in her hands and feet.  This mostly seems to be positional and not all day.  We discussed that it is not likely going to be neuropathy but more a positional issue.  She did discuss her medicines and what insurance to go with next year to cover those.  She was also concerned about being able to reach me with the answering higher than the [place] messages.  Advised that if she wants me to call her when she sends a [place] message just put that in the message and I will.  We did discuss that she can use both Citracal and the stool softener.  We did discuss vaccines.  She is going to receive a flu shot today.  We did advise her to get a RSV vaccine at the pharmacy.  She was upset about the election.  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  Advised that I do not think that would be a very ferro thing to do    Objective   Vital Signs:  /68 (BP Location: Right arm,  "Patient Position: Sitting, Cuff Size: Adult)   Pulse 78   Ht 148.6 cm (58.5\")   Wt 56.2 kg (123 lb 12.8 oz)   SpO2 97%   BMI 25.43 kg/m²   Estimated body mass index is 25.43 kg/m² as calculated from the following:    Height as of this encounter: 148.6 cm (58.5\").    Weight as of this encounter: 56.2 kg (123 lb 12.8 oz).    BMI is >= 25 and <30. (Overweight) The following options were offered after discussion;: weight loss educational material (shared in after visit summary) and exercise counseling/recommendations      Physical Exam  Vitals and nursing note reviewed.   Constitutional:       Appearance: Normal appearance.   Neck:      Vascular: No carotid bruit.   Cardiovascular:      Rate and Rhythm: Normal rate and regular rhythm.   Pulmonary:      Effort: Pulmonary effort is normal.      Breath sounds: No wheezing, rhonchi or rales.   Abdominal:      Tenderness: There is no abdominal tenderness. There is no guarding or rebound.   Genitourinary:     Comments: There is no suprapubic tenderness.  Neurological:      Mental Status: She is alert.      Result Review :  The following data was reviewed by: Humberto Lopez MD on 11/13/2024:  Common labs          5/8/2024    11:20   Common Labs   Glucose 95    BUN 20    Creatinine 0.83    Sodium 139    Potassium 4.1    Chloride 103    Calcium 9.7    Total Protein 6.6    Albumin 4.3    Total Bilirubin 0.5    Alkaline Phosphatase 45    AST (SGOT) 19    ALT (SGPT) 11    WBC 7.25    Hemoglobin 12.6    Hematocrit 37.5    Platelets 296    Total Cholesterol 230    Triglycerides 133    HDL Cholesterol 69    LDL Cholesterol  138      Data reviewed : Radiologic studies dexa scan 2024           Assessment and Plan   Diagnoses and all orders for this visit:    1. Gross hematuria (Primary)  -     Ambulatory Referral to Urology  -     Urinalysis With Microscopic - Urine, Clean Catch  -     Urine Culture - Urine, Urine, Clean Catch    2. Hyperlipidemia, unspecified " hyperlipidemia type  -     Comprehensive Metabolic Panel  -     Lipid Panel    3. Acquired hypothyroidism  -     TSH+Free T4    4. Sensorineural hearing loss (SNHL) of both ears    Other orders  -     Fluzone High-Dose 65+yrs           I spent 60 minutes caring for Emerita on this date of service. This time includes time spent by me in the following activities:preparing for the visit, reviewing tests, obtaining and/or reviewing a separately obtained history, performing a medically appropriate examination and/or evaluation , counseling and educating the patient/family/caregiver, referring and communicating with other health care professionals , and documenting information in the medical record  Follow Up   No follow-ups on file.  Patient was given instructions and counseling regarding her condition or for health maintenance advice. Please see specific information pulled into the AVS if appropriate.

## 2024-11-13 NOTE — PROGRESS NOTES
Injection  Injection performed in left deltoid  by David Carvajal MA. Patient tolerated the procedure well without complications.  11/13/24   David Carvajal MA   Phoned in prescription below to patients pharmacy on file - verbal order received : Dr Namrata Ga.

## 2024-11-14 LAB
ALBUMIN SERPL-MCNC: 4.4 G/DL (ref 3.7–4.7)
ALP SERPL-CCNC: 59 IU/L (ref 44–121)
ALT SERPL-CCNC: 14 IU/L (ref 0–32)
APPEARANCE UR: CLEAR
AST SERPL-CCNC: 22 IU/L (ref 0–40)
BACTERIA #/AREA URNS HPF: ABNORMAL /[HPF]
BILIRUB SERPL-MCNC: 0.3 MG/DL (ref 0–1.2)
BILIRUB UR QL STRIP: NEGATIVE
BUN SERPL-MCNC: 28 MG/DL (ref 8–27)
BUN/CREAT SERPL: 33 (ref 12–28)
CALCIUM SERPL-MCNC: 9.7 MG/DL (ref 8.7–10.3)
CASTS URNS QL MICRO: ABNORMAL /LPF
CHLORIDE SERPL-SCNC: 102 MMOL/L (ref 96–106)
CHOLEST SERPL-MCNC: 243 MG/DL (ref 100–199)
CO2 SERPL-SCNC: 22 MMOL/L (ref 20–29)
COLOR UR: YELLOW
CREAT SERPL-MCNC: 0.84 MG/DL (ref 0.57–1)
EGFRCR SERPLBLD CKD-EPI 2021: 67 ML/MIN/1.73
EPI CELLS #/AREA URNS HPF: ABNORMAL /HPF (ref 0–10)
GLOBULIN SER CALC-MCNC: 2.5 G/DL (ref 1.5–4.5)
GLUCOSE SERPL-MCNC: 88 MG/DL (ref 70–99)
GLUCOSE UR QL STRIP: NEGATIVE
HDLC SERPL-MCNC: 70 MG/DL
HGB UR QL STRIP: NEGATIVE
KETONES UR QL STRIP: NEGATIVE
LDLC SERPL CALC-MCNC: 145 MG/DL (ref 0–99)
LEUKOCYTE ESTERASE UR QL STRIP: ABNORMAL
MICRO URNS: ABNORMAL
NITRITE UR QL STRIP: NEGATIVE
PH UR STRIP: 6.5 [PH] (ref 5–7.5)
POTASSIUM SERPL-SCNC: 4.3 MMOL/L (ref 3.5–5.2)
PROT SERPL-MCNC: 6.9 G/DL (ref 6–8.5)
PROT UR QL STRIP: NEGATIVE
RBC #/AREA URNS HPF: ABNORMAL /HPF (ref 0–2)
SODIUM SERPL-SCNC: 138 MMOL/L (ref 134–144)
SP GR UR STRIP: 1.02 (ref 1–1.03)
T4 FREE SERPL-MCNC: 1.48 NG/DL (ref 0.82–1.77)
TRIGL SERPL-MCNC: 156 MG/DL (ref 0–149)
TSH SERPL DL<=0.005 MIU/L-ACNC: 3 UIU/ML (ref 0.45–4.5)
UROBILINOGEN UR STRIP-MCNC: 0.2 MG/DL (ref 0.2–1)
VLDLC SERPL CALC-MCNC: 28 MG/DL (ref 5–40)
WBC #/AREA URNS HPF: ABNORMAL /HPF (ref 0–5)

## 2024-11-15 LAB
BACTERIA UR CULT: NORMAL
BACTERIA UR CULT: NORMAL

## 2024-11-22 ENCOUNTER — TELEPHONE (OUTPATIENT)
Dept: FAMILY MEDICINE CLINIC | Facility: CLINIC | Age: 89
End: 2024-11-22
Payer: MEDICARE

## 2024-11-22 NOTE — TELEPHONE ENCOUNTER
I spoke to the patient regarding test results.  I did advise that there is no further blood.  I did advise to still see the urologist.  We discussed the low colony count of mixed bacteria and not treating that and she is okay with that.

## 2024-12-30 RX ORDER — FLUTICASONE FUROATE AND VILANTEROL TRIFENATATE 100; 25 UG/1; UG/1
POWDER RESPIRATORY (INHALATION)
Qty: 60 EACH | Refills: 5 | Status: SHIPPED | OUTPATIENT
Start: 2024-12-30

## 2025-01-30 ENCOUNTER — TELEPHONE (OUTPATIENT)
Dept: FAMILY MEDICINE CLINIC | Facility: CLINIC | Age: 89
End: 2025-01-30
Payer: MEDICARE

## 2025-01-30 NOTE — TELEPHONE ENCOUNTER
I answered the patient's questions regarding the biopsy and possible outcomes and treatment options

## 2025-02-17 RX ORDER — NITROFURANTOIN 25; 75 MG/1; MG/1
100 CAPSULE ORAL 2 TIMES DAILY
Qty: 14 CAPSULE | Refills: 0 | Status: SHIPPED | OUTPATIENT
Start: 2025-02-17

## 2025-05-21 ENCOUNTER — OFFICE VISIT (OUTPATIENT)
Dept: FAMILY MEDICINE CLINIC | Facility: CLINIC | Age: 89
End: 2025-05-21
Payer: MEDICARE

## 2025-05-21 VITALS
SYSTOLIC BLOOD PRESSURE: 108 MMHG | HEART RATE: 76 BPM | DIASTOLIC BLOOD PRESSURE: 58 MMHG | BODY MASS INDEX: 24.39 KG/M2 | RESPIRATION RATE: 16 BRPM | WEIGHT: 121 LBS | HEIGHT: 59 IN | OXYGEN SATURATION: 96 %

## 2025-05-21 DIAGNOSIS — R20.2 TINGLING SENSATION: ICD-10-CM

## 2025-05-21 DIAGNOSIS — K14.6 BURNING TONGUE: ICD-10-CM

## 2025-05-21 DIAGNOSIS — E78.5 HYPERLIPIDEMIA, UNSPECIFIED HYPERLIPIDEMIA TYPE: Primary | ICD-10-CM

## 2025-05-21 DIAGNOSIS — E03.9 ACQUIRED HYPOTHYROIDISM: ICD-10-CM

## 2025-05-21 DIAGNOSIS — N30.20 CHRONIC CYSTITIS: ICD-10-CM

## 2025-05-21 LAB
BILIRUB BLD-MCNC: NEGATIVE MG/DL
CLARITY, POC: CLEAR
COLOR UR: ABNORMAL
EXPIRATION DATE: ABNORMAL
GLUCOSE UR STRIP-MCNC: NEGATIVE MG/DL
KETONES UR QL: ABNORMAL
LEUKOCYTE EST, POC: ABNORMAL
Lab: ABNORMAL
NITRITE UR-MCNC: NEGATIVE MG/ML
PH UR: 6 [PH] (ref 5–8)
PROT UR STRIP-MCNC: NEGATIVE MG/DL
RBC # UR STRIP: NEGATIVE /UL
SP GR UR: 1.02 (ref 1–1.03)
UROBILINOGEN UR QL: NORMAL

## 2025-05-21 RX ORDER — NYSTATIN AND TRIAMCINOLONE ACETONIDE 100000; 1 [USP'U]/G; MG/G
1 CREAM TOPICAL
COMMUNITY
End: 2025-05-21
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
## 2025-05-22 LAB
ALBUMIN SERPL-MCNC: 4.3 G/DL (ref 3.7–4.7)
ALP SERPL-CCNC: 55 IU/L (ref 44–121)
ALT SERPL-CCNC: 12 IU/L (ref 0–32)
AST SERPL-CCNC: 18 IU/L (ref 0–40)
BASOPHILS # BLD AUTO: 0.1 X10E3/UL (ref 0–0.2)
BASOPHILS NFR BLD AUTO: 1 %
BILIRUB SERPL-MCNC: 0.4 MG/DL (ref 0–1.2)
BUN SERPL-MCNC: 20 MG/DL (ref 8–27)
BUN/CREAT SERPL: 23 (ref 12–28)
CALCIUM SERPL-MCNC: 9.7 MG/DL (ref 8.7–10.3)
CHLORIDE SERPL-SCNC: 101 MMOL/L (ref 96–106)
CHOLEST SERPL-MCNC: 247 MG/DL (ref 100–199)
CO2 SERPL-SCNC: 21 MMOL/L (ref 20–29)
CREAT SERPL-MCNC: 0.87 MG/DL (ref 0.57–1)
EGFRCR SERPLBLD CKD-EPI 2021: 64 ML/MIN/1.73
EOSINOPHIL # BLD AUTO: 0.1 X10E3/UL (ref 0–0.4)
EOSINOPHIL NFR BLD AUTO: 1 %
ERYTHROCYTE [DISTWIDTH] IN BLOOD BY AUTOMATED COUNT: 12 % (ref 11.7–15.4)
FOLATE SERPL-MCNC: >20 NG/ML
GLOBULIN SER CALC-MCNC: 2.5 G/DL (ref 1.5–4.5)
GLUCOSE SERPL-MCNC: 89 MG/DL (ref 70–99)
HCT VFR BLD AUTO: 37.2 % (ref 34–46.6)
HDLC SERPL-MCNC: 71 MG/DL
HGB BLD-MCNC: 12.1 G/DL (ref 11.1–15.9)
IMM GRANULOCYTES # BLD AUTO: 0 X10E3/UL (ref 0–0.1)
IMM GRANULOCYTES NFR BLD AUTO: 0 %
LDLC SERPL CALC-MCNC: 158 MG/DL (ref 0–99)
LYMPHOCYTES # BLD AUTO: 1.9 X10E3/UL (ref 0.7–3.1)
LYMPHOCYTES NFR BLD AUTO: 24 %
MCH RBC QN AUTO: 33.3 PG (ref 26.6–33)
MCHC RBC AUTO-ENTMCNC: 32.5 G/DL (ref 31.5–35.7)
MCV RBC AUTO: 103 FL (ref 79–97)
MONOCYTES # BLD AUTO: 0.6 X10E3/UL (ref 0.1–0.9)
MONOCYTES NFR BLD AUTO: 8 %
NEUTROPHILS # BLD AUTO: 5 X10E3/UL (ref 1.4–7)
NEUTROPHILS NFR BLD AUTO: 66 %
PLATELET # BLD AUTO: 322 X10E3/UL (ref 150–450)
POTASSIUM SERPL-SCNC: 4.1 MMOL/L (ref 3.5–5.2)
PROT SERPL-MCNC: 6.8 G/DL (ref 6–8.5)
RBC # BLD AUTO: 3.63 X10E6/UL (ref 3.77–5.28)
SODIUM SERPL-SCNC: 138 MMOL/L (ref 134–144)
T3FREE SERPL-MCNC: 1.9 PG/ML (ref 2–4.4)
T4 FREE SERPL-MCNC: 1.38 NG/DL (ref 0.82–1.77)
TRIGL SERPL-MCNC: 104 MG/DL (ref 0–149)
TSH SERPL DL<=0.005 MIU/L-ACNC: 1.9 UIU/ML (ref 0.45–4.5)
VIT B12 SERPL-MCNC: >2000 PG/ML (ref 232–1245)
VLDLC SERPL CALC-MCNC: 18 MG/DL (ref 5–40)
WBC # BLD AUTO: 7.7 X10E3/UL (ref 3.4–10.8)

## 2025-05-24 LAB — PYRIDOXAL PHOS SERPL-MCNC: 51.4 UG/L (ref 3.4–65.2)

## 2025-05-27 ENCOUNTER — OFFICE VISIT (OUTPATIENT)
Dept: OBSTETRICS AND GYNECOLOGY | Age: 89
End: 2025-05-27
Payer: MEDICARE

## 2025-05-27 VITALS
HEIGHT: 59 IN | DIASTOLIC BLOOD PRESSURE: 70 MMHG | SYSTOLIC BLOOD PRESSURE: 122 MMHG | WEIGHT: 120 LBS | BODY MASS INDEX: 24.19 KG/M2

## 2025-05-27 DIAGNOSIS — Z12.31 BREAST CANCER SCREENING BY MAMMOGRAM: ICD-10-CM

## 2025-05-27 DIAGNOSIS — Z87.448 HISTORY OF BLOOD IN URINE: ICD-10-CM

## 2025-05-27 DIAGNOSIS — Z01.419 ENCOUNTER FOR GYNECOLOGICAL EXAMINATION: Primary | ICD-10-CM

## 2025-05-27 DIAGNOSIS — N95.2 ATROPHIC VAGINITIS: ICD-10-CM

## 2025-05-27 DIAGNOSIS — Z71.89 COUNSELING FOR HORMONE REPLACEMENT THERAPY: ICD-10-CM

## 2025-05-27 LAB
BILIRUB BLD-MCNC: NEGATIVE MG/DL
CLARITY, POC: CLEAR
COLOR UR: YELLOW
GLUCOSE UR STRIP-MCNC: NEGATIVE MG/DL
KETONES UR QL: ABNORMAL
LEUKOCYTE EST, POC: ABNORMAL
NITRITE UR-MCNC: NEGATIVE MG/ML
PH UR: 7 [PH] (ref 5–8)
PROT UR STRIP-MCNC: NEGATIVE MG/DL
RBC # UR STRIP: NEGATIVE /UL
SP GR UR: 1.02 (ref 1–1.03)
UROBILINOGEN UR QL: NORMAL

## 2025-05-27 RX ORDER — MEDROXYPROGESTERONE ACETATE 2.5 MG/1
2.5 TABLET ORAL DAILY
Qty: 90 TABLET | Refills: 3 | Status: SHIPPED | OUTPATIENT
Start: 2025-05-27

## 2025-05-27 RX ORDER — ESTRADIOL 0.5 MG/1
0.5 TABLET ORAL DAILY
Qty: 90 TABLET | Refills: 3 | Status: SHIPPED | OUTPATIENT
Start: 2025-05-27
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
## 2025-06-02 ENCOUNTER — HOSPITAL ENCOUNTER (OUTPATIENT)
Facility: HOSPITAL | Age: 89
Discharge: HOME | End: 2025-06-02
Admitting: OBSTETRICS & GYNECOLOGY
Payer: MEDICARE

## 2025-06-02 DIAGNOSIS — Z12.31 BREAST CANCER SCREENING BY MAMMOGRAM: ICD-10-CM

## 2025-06-02 DIAGNOSIS — Z71.89 COUNSELING FOR HORMONE REPLACEMENT THERAPY: ICD-10-CM

## 2025-06-02 PROCEDURE — 77067 SCR MAMMO BI INCL CAD: CPT

## 2025-06-02 PROCEDURE — 77063 BREAST TOMOSYNTHESIS BI: CPT

## 2025-06-12 ENCOUNTER — DOCUMENTATION (OUTPATIENT)
Dept: PHYSICAL THERAPY | Facility: CLINIC | Age: 89
End: 2025-06-12
Payer: MEDICARE

## 2025-06-12 NOTE — PROGRESS NOTES
Patient did not return    Closure of Physical Therapy Encounter            Caity Samuels, PT  Physical Therapist

## 2025-06-25 ENCOUNTER — TELEPHONE (OUTPATIENT)
Dept: FAMILY MEDICINE CLINIC | Facility: CLINIC | Age: 89
End: 2025-06-25

## 2025-06-25 NOTE — TELEPHONE ENCOUNTER
Caller: Emerita Bazan    Relationship: Self    Best call back number: [phone]    What form or medical record are you requesting: LIVING WILL COPY    Who is requesting this form or medical record from you: PATIENT    How would you like to receive the form or medical records (pick-up, mail, fax):   If fax, what is the fax number:   If mail, what is the address:   If pick-up, provide patient with address and location details    Timeframe paperwork needed: ASAP    Additional notes: PATIENT IS REQUESTING TO  A COPY OF HER LIVING WILL  PLEASE ADVISE WHEN READY TO .

## 2025-07-10 RX ORDER — FLUTICASONE FUROATE AND VILANTEROL TRIFENATATE 100; 25 UG/1; UG/1
POWDER RESPIRATORY (INHALATION)
Qty: 60 EACH | Refills: 5 | Status: SHIPPED | OUTPATIENT
Start: 2025-07-10

## 2025-08-19 RX ORDER — LEVOTHYROXINE SODIUM 75 UG/1
75 TABLET ORAL DAILY
Qty: 90 TABLET | Refills: 3 | Status: SHIPPED | OUTPATIENT
Start: 2025-08-19